# Patient Record
Sex: FEMALE | Race: OTHER | HISPANIC OR LATINO
[De-identification: names, ages, dates, MRNs, and addresses within clinical notes are randomized per-mention and may not be internally consistent; named-entity substitution may affect disease eponyms.]

---

## 2017-02-07 ENCOUNTER — NON-APPOINTMENT (OUTPATIENT)
Age: 77
End: 2017-02-07

## 2017-02-07 ENCOUNTER — APPOINTMENT (OUTPATIENT)
Dept: ELECTROPHYSIOLOGY | Facility: CLINIC | Age: 77
End: 2017-02-07

## 2017-02-07 VITALS
SYSTOLIC BLOOD PRESSURE: 115 MMHG | HEART RATE: 83 BPM | OXYGEN SATURATION: 97 % | WEIGHT: 147 LBS | BODY MASS INDEX: 23.63 KG/M2 | HEIGHT: 66 IN | DIASTOLIC BLOOD PRESSURE: 71 MMHG

## 2017-02-07 RX ORDER — REPAGLINIDE 1 MG/1
1 TABLET ORAL
Refills: 0 | Status: ACTIVE | COMMUNITY

## 2017-02-07 RX ORDER — PROPAFENONE 325 MG/1
325 CAPSULE, EXTENDED RELEASE ORAL
Refills: 0 | Status: DISCONTINUED | COMMUNITY
End: 2017-02-07

## 2017-03-24 ENCOUNTER — NON-APPOINTMENT (OUTPATIENT)
Age: 77
End: 2017-03-24

## 2017-03-24 ENCOUNTER — APPOINTMENT (OUTPATIENT)
Dept: CARDIOLOGY | Facility: CLINIC | Age: 77
End: 2017-03-24

## 2017-03-24 VITALS
WEIGHT: 151 LBS | OXYGEN SATURATION: 96 % | SYSTOLIC BLOOD PRESSURE: 137 MMHG | DIASTOLIC BLOOD PRESSURE: 80 MMHG | BODY MASS INDEX: 24.27 KG/M2 | HEIGHT: 66 IN | HEART RATE: 78 BPM

## 2017-04-22 ENCOUNTER — TRANSCRIPTION ENCOUNTER (OUTPATIENT)
Age: 77
End: 2017-04-22

## 2017-05-09 ENCOUNTER — APPOINTMENT (OUTPATIENT)
Dept: ELECTROPHYSIOLOGY | Facility: CLINIC | Age: 77
End: 2017-05-09

## 2017-05-09 DIAGNOSIS — Z86.79 PERSONAL HISTORY OF OTHER DISEASES OF THE CIRCULATORY SYSTEM: ICD-10-CM

## 2017-05-09 RX ORDER — BLOOD SUGAR DIAGNOSTIC
STRIP MISCELLANEOUS
Qty: 100 | Refills: 0 | Status: ACTIVE | COMMUNITY
Start: 2017-03-13

## 2017-08-15 ENCOUNTER — APPOINTMENT (OUTPATIENT)
Dept: CARDIOLOGY | Facility: CLINIC | Age: 77
End: 2017-08-15

## 2017-08-15 ENCOUNTER — APPOINTMENT (OUTPATIENT)
Dept: ELECTROPHYSIOLOGY | Facility: CLINIC | Age: 77
End: 2017-08-15

## 2017-11-21 ENCOUNTER — APPOINTMENT (OUTPATIENT)
Dept: ELECTROPHYSIOLOGY | Facility: CLINIC | Age: 77
End: 2017-11-21

## 2018-05-16 ENCOUNTER — RESULT REVIEW (OUTPATIENT)
Age: 78
End: 2018-05-16

## 2019-05-31 ENCOUNTER — APPOINTMENT (OUTPATIENT)
Dept: PULMONOLOGY | Facility: CLINIC | Age: 79
End: 2019-05-31
Payer: MEDICARE

## 2019-05-31 VITALS — SYSTOLIC BLOOD PRESSURE: 152 MMHG | DIASTOLIC BLOOD PRESSURE: 56 MMHG

## 2019-05-31 VITALS
HEIGHT: 66 IN | HEART RATE: 67 BPM | WEIGHT: 149 LBS | SYSTOLIC BLOOD PRESSURE: 100 MMHG | BODY MASS INDEX: 23.95 KG/M2 | OXYGEN SATURATION: 97 % | DIASTOLIC BLOOD PRESSURE: 52 MMHG

## 2019-05-31 DIAGNOSIS — R07.89 OTHER CHEST PAIN: ICD-10-CM

## 2019-05-31 PROCEDURE — 94060 EVALUATION OF WHEEZING: CPT

## 2019-05-31 PROCEDURE — 94729 DIFFUSING CAPACITY: CPT

## 2019-05-31 PROCEDURE — 99204 OFFICE O/P NEW MOD 45 MIN: CPT | Mod: 25

## 2019-05-31 PROCEDURE — 94727 GAS DIL/WSHOT DETER LNG VOL: CPT

## 2019-05-31 RX ORDER — SITAGLIPTIN 100 MG/1
100 TABLET, FILM COATED ORAL
Refills: 0 | Status: DISCONTINUED | COMMUNITY
End: 2019-05-31

## 2019-06-01 NOTE — DISCUSSION/SUMMARY
[FreeTextEntry1] : 77 y/o woman with PMH of HTN, HLD, DM, PAF, S/P PPM and nephrolithiasis. Has h/o TB treated in NH at the age of 9. \par \par She was complaining of a cough. The cough is improved. No active pulmonary disease at present.  Also has had some back pain. This could be due to the nephrolithiasis. A chest radiograph and KUB will be obtained. Further recommendations to follow.\par \par F/U in three months.

## 2019-06-01 NOTE — PHYSICAL EXAM
[General Appearance - In No Acute Distress] : no acute distress [Neck Appearance] : the appearance of the neck was normal [Neck Cervical Mass (___cm)] : no neck mass was observed [] : no hepato-splenomegaly [Auscultation Breath Sounds / Voice Sounds] : lungs were clear to auscultation bilaterally [Abnormal Walk] : normal gait [Cyanosis, Localized] : no localized cyanosis [Cranial Nerves] : cranial nerves 2-12 were intact [Skin Turgor] : normal skin turgor [Impaired Insight] : insight and judgment were intact [Deep Tendon Reflexes (DTR)] : deep tendon reflexes were 2+ and symmetric [Oriented To Time, Place, And Person] : oriented to person, place, and time [Erythema] : no erythema of the pharynx

## 2019-06-01 NOTE — HISTORY OF PRESENT ILLNESS
[FreeTextEntry1] : Has had a cough for several months. Was producing white phlegm. Never had any hemoptysis. No constitutional complaints. She has a history of tuberculosis at the age of 9. She was living in [place] at that time. Was treated with pills but cannot recall the name of them.

## 2019-06-01 NOTE — PROCEDURE
[FreeTextEntry1] : PFT 5/31/19: Spirometry was normal. Lungs lungs were normal. Diffusion was reduced to a moderate degree. Mild improvement noted after inhalation of bronchodilator.\par \par Chest radiograph from January 15, 2019 demonstrated biapical pleural thickening and scarring. Right sided pacemaker in position. No infiltrate.\par \par

## 2019-06-01 NOTE — REVIEW OF SYSTEMS
[Back Pain] : ~T back pain [Diabetes] : diabetes mellitus [Fever] : no fever [Cough] : no cough [Sputum] : not coughing up ~M sputum [Hemoptysis] : no hemoptysis [Nasal Discharge] : no nasal discharge [Edema] : ~T edema was not present [Nocturia] : no nocturia [Heartburn] : no heartburn [Rash] : no [unfilled] rash [Anemia] : no anemia [Difficulty Initiating Sleep] : no difficulty falling asleep [DVT] : no DVT [Difficulty Maintaining Sleep] : no difficulty maintaining sleep

## 2019-08-28 ENCOUNTER — APPOINTMENT (OUTPATIENT)
Dept: PULMONOLOGY | Facility: CLINIC | Age: 79
End: 2019-08-28

## 2021-04-02 DIAGNOSIS — Z20.828 CONTACT WITH AND (SUSPECTED) EXPOSURE TO OTHER VIRAL COMMUNICABLE DISEASES: ICD-10-CM

## 2021-04-09 ENCOUNTER — APPOINTMENT (OUTPATIENT)
Dept: DISASTER EMERGENCY | Facility: CLINIC | Age: 81
End: 2021-04-09

## 2021-04-09 DIAGNOSIS — Z01.818 ENCOUNTER FOR OTHER PREPROCEDURAL EXAMINATION: ICD-10-CM

## 2021-04-10 LAB — SARS-COV-2 N GENE NPH QL NAA+PROBE: NOT DETECTED

## 2021-04-13 ENCOUNTER — APPOINTMENT (OUTPATIENT)
Dept: PULMONOLOGY | Facility: CLINIC | Age: 81
End: 2021-04-13
Payer: MEDICARE

## 2021-04-13 VITALS
HEART RATE: 59 BPM | OXYGEN SATURATION: 96 % | DIASTOLIC BLOOD PRESSURE: 75 MMHG | TEMPERATURE: 97.9 F | SYSTOLIC BLOOD PRESSURE: 153 MMHG

## 2021-04-13 PROCEDURE — 94010 BREATHING CAPACITY TEST: CPT

## 2021-04-13 PROCEDURE — 94727 GAS DIL/WSHOT DETER LNG VOL: CPT

## 2021-04-13 PROCEDURE — 94729 DIFFUSING CAPACITY: CPT

## 2021-04-13 PROCEDURE — 71046 X-RAY EXAM CHEST 2 VIEWS: CPT

## 2021-04-13 PROCEDURE — 88738 HGB QUANT TRANSCUTANEOUS: CPT

## 2021-04-13 PROCEDURE — ZZZZZ: CPT

## 2021-04-13 PROCEDURE — 99204 OFFICE O/P NEW MOD 45 MIN: CPT | Mod: 25

## 2021-04-15 ENCOUNTER — OUTPATIENT (OUTPATIENT)
Dept: OUTPATIENT SERVICES | Facility: HOSPITAL | Age: 81
LOS: 1 days | End: 2021-04-15
Payer: MEDICARE

## 2021-04-15 ENCOUNTER — APPOINTMENT (OUTPATIENT)
Dept: CT IMAGING | Facility: IMAGING CENTER | Age: 81
End: 2021-04-15
Payer: MEDICARE

## 2021-04-15 DIAGNOSIS — R06.02 SHORTNESS OF BREATH: ICD-10-CM

## 2021-04-15 PROCEDURE — G1004: CPT

## 2021-04-15 PROCEDURE — 71250 CT THORAX DX C-: CPT | Mod: 26,ME

## 2021-04-15 PROCEDURE — 71250 CT THORAX DX C-: CPT

## 2021-04-15 NOTE — ASSESSMENT
[FreeTextEntry1] : Shortness of breath etiology unclear.  Chest x-ray unremarkable except for pacemaker.  PFTs do show low diffusing capacity.  Recommend chest CT for further evaluation

## 2021-04-15 NOTE — HISTORY OF PRESENT ILLNESS
[Never] : never [TextBox_4] : Patient reports chronic shortness of breath with reduced exercise tolerance.  Shortness of breath with exertion.  No wheezing.  No current active cough.  Remote history of tuberculosis treated in childhood with injectable medications.  Lifelong non-smoker denies occupational exposures.\par \par

## 2021-04-23 ENCOUNTER — APPOINTMENT (OUTPATIENT)
Dept: PULMONOLOGY | Facility: CLINIC | Age: 81
End: 2021-04-23
Payer: MEDICARE

## 2021-04-23 VITALS
RESPIRATION RATE: 15 BRPM | DIASTOLIC BLOOD PRESSURE: 80 MMHG | HEART RATE: 60 BPM | TEMPERATURE: 98 F | SYSTOLIC BLOOD PRESSURE: 147 MMHG | OXYGEN SATURATION: 99 %

## 2021-04-23 PROCEDURE — 99214 OFFICE O/P EST MOD 30 MIN: CPT

## 2021-04-24 NOTE — ASSESSMENT
[FreeTextEntry1] : Emphysema in the absence of significant smoking history obtained alpha-1 level and genetic testing.  Does not appear to have any spirometric impairment and therefore inhaler is unlikely to be of benefit.

## 2021-04-24 NOTE — HISTORY OF PRESENT ILLNESS
[TextBox_4] : Follow-up for shortness of breath.  Noted to have abnormal PFT sent for CT scan here for result review

## 2021-07-23 ENCOUNTER — APPOINTMENT (OUTPATIENT)
Dept: PULMONOLOGY | Facility: CLINIC | Age: 81
End: 2021-07-23
Payer: MEDICARE

## 2021-07-23 VITALS
DIASTOLIC BLOOD PRESSURE: 77 MMHG | SYSTOLIC BLOOD PRESSURE: 138 MMHG | OXYGEN SATURATION: 100 % | HEART RATE: 60 BPM | TEMPERATURE: 97.8 F

## 2021-07-23 VITALS — HEIGHT: 66 IN | BODY MASS INDEX: 24.27 KG/M2 | WEIGHT: 151 LBS

## 2021-07-23 PROCEDURE — 94664 DEMO&/EVAL PT USE INHALER: CPT

## 2021-07-23 PROCEDURE — 99213 OFFICE O/P EST LOW 20 MIN: CPT | Mod: 25

## 2021-07-24 NOTE — HISTORY OF PRESENT ILLNESS
[TextBox_4] : neck pain after fall\par told to see neuro\par \par Pulmonary status otherwise unchanged still with shortness of breath\par \par

## 2021-07-24 NOTE — ASSESSMENT
[FreeTextEntry1] : Emphysema as manifestation of COPD non-smoker alpha testing negative.  Patient requests inhaler trial samples of Anoro provided.\par Proper use and technique for inhaler demonstrated and explained.\par

## 2021-09-22 ENCOUNTER — EMERGENCY (EMERGENCY)
Facility: HOSPITAL | Age: 81
LOS: 1 days | Discharge: HOME | End: 2021-09-22
Attending: EMERGENCY MEDICINE | Admitting: EMERGENCY MEDICINE
Payer: MEDICARE

## 2021-09-22 VITALS
HEIGHT: 66 IN | DIASTOLIC BLOOD PRESSURE: 73 MMHG | TEMPERATURE: 98 F | RESPIRATION RATE: 20 BRPM | SYSTOLIC BLOOD PRESSURE: 151 MMHG | HEART RATE: 60 BPM | OXYGEN SATURATION: 100 %

## 2021-09-22 LAB
ALBUMIN SERPL ELPH-MCNC: 4.4 G/DL — SIGNIFICANT CHANGE UP (ref 3.3–5)
ALP SERPL-CCNC: 102 U/L — SIGNIFICANT CHANGE UP (ref 40–120)
ALT FLD-CCNC: 17 U/L — SIGNIFICANT CHANGE UP (ref 4–33)
ANION GAP SERPL CALC-SCNC: 17 MMOL/L — HIGH (ref 7–14)
APTT BLD: 37 SEC — HIGH (ref 27–36.3)
AST SERPL-CCNC: 21 U/L — SIGNIFICANT CHANGE UP (ref 4–32)
BASOPHILS # BLD AUTO: 0.03 K/UL — SIGNIFICANT CHANGE UP (ref 0–0.2)
BASOPHILS NFR BLD AUTO: 0.3 % — SIGNIFICANT CHANGE UP (ref 0–2)
BILIRUB SERPL-MCNC: 0.7 MG/DL — SIGNIFICANT CHANGE UP (ref 0.2–1.2)
BLOOD GAS VENOUS COMPREHENSIVE RESULT: SIGNIFICANT CHANGE UP
BUN SERPL-MCNC: 24 MG/DL — HIGH (ref 7–23)
CALCIUM SERPL-MCNC: 10.1 MG/DL — SIGNIFICANT CHANGE UP (ref 8.4–10.5)
CHLORIDE SERPL-SCNC: 100 MMOL/L — SIGNIFICANT CHANGE UP (ref 98–107)
CO2 SERPL-SCNC: 20 MMOL/L — LOW (ref 22–31)
CREAT SERPL-MCNC: 1.06 MG/DL — SIGNIFICANT CHANGE UP (ref 0.5–1.3)
EOSINOPHIL # BLD AUTO: 0.02 K/UL — SIGNIFICANT CHANGE UP (ref 0–0.5)
EOSINOPHIL NFR BLD AUTO: 0.2 % — SIGNIFICANT CHANGE UP (ref 0–6)
GLUCOSE SERPL-MCNC: 180 MG/DL — HIGH (ref 70–99)
HCT VFR BLD CALC: 36.5 % — SIGNIFICANT CHANGE UP (ref 34.5–45)
HGB BLD-MCNC: 12.7 G/DL — SIGNIFICANT CHANGE UP (ref 11.5–15.5)
IANC: 6.77 K/UL — SIGNIFICANT CHANGE UP (ref 1.5–8.5)
IMM GRANULOCYTES NFR BLD AUTO: 0.5 % — SIGNIFICANT CHANGE UP (ref 0–1.5)
INR BLD: 1.51 RATIO — HIGH (ref 0.88–1.16)
LYMPHOCYTES # BLD AUTO: 1.25 K/UL — SIGNIFICANT CHANGE UP (ref 1–3.3)
LYMPHOCYTES # BLD AUTO: 14.6 % — SIGNIFICANT CHANGE UP (ref 13–44)
MCHC RBC-ENTMCNC: 28.9 PG — SIGNIFICANT CHANGE UP (ref 27–34)
MCHC RBC-ENTMCNC: 34.8 GM/DL — SIGNIFICANT CHANGE UP (ref 32–36)
MCV RBC AUTO: 83.1 FL — SIGNIFICANT CHANGE UP (ref 80–100)
MONOCYTES # BLD AUTO: 0.47 K/UL — SIGNIFICANT CHANGE UP (ref 0–0.9)
MONOCYTES NFR BLD AUTO: 5.5 % — SIGNIFICANT CHANGE UP (ref 2–14)
NEUTROPHILS # BLD AUTO: 6.77 K/UL — SIGNIFICANT CHANGE UP (ref 1.8–7.4)
NEUTROPHILS NFR BLD AUTO: 78.9 % — HIGH (ref 43–77)
NRBC # BLD: 0 /100 WBCS — SIGNIFICANT CHANGE UP
NRBC # FLD: 0 K/UL — SIGNIFICANT CHANGE UP
PLATELET # BLD AUTO: 272 K/UL — SIGNIFICANT CHANGE UP (ref 150–400)
POTASSIUM SERPL-MCNC: 4.3 MMOL/L — SIGNIFICANT CHANGE UP (ref 3.5–5.3)
POTASSIUM SERPL-SCNC: 4.3 MMOL/L — SIGNIFICANT CHANGE UP (ref 3.5–5.3)
PROT SERPL-MCNC: 7.8 G/DL — SIGNIFICANT CHANGE UP (ref 6–8.3)
PROTHROM AB SERPL-ACNC: 16.9 SEC — HIGH (ref 10.6–13.6)
RBC # BLD: 4.39 M/UL — SIGNIFICANT CHANGE UP (ref 3.8–5.2)
RBC # FLD: 12.4 % — SIGNIFICANT CHANGE UP (ref 10.3–14.5)
SODIUM SERPL-SCNC: 137 MMOL/L — SIGNIFICANT CHANGE UP (ref 135–145)
TROPONIN T, HIGH SENSITIVITY RESULT: 12 NG/L — SIGNIFICANT CHANGE UP
WBC # BLD: 8.58 K/UL — SIGNIFICANT CHANGE UP (ref 3.8–10.5)
WBC # FLD AUTO: 8.58 K/UL — SIGNIFICANT CHANGE UP (ref 3.8–10.5)

## 2021-09-22 PROCEDURE — 71046 X-RAY EXAM CHEST 2 VIEWS: CPT | Mod: 26

## 2021-09-22 PROCEDURE — 99284 EMERGENCY DEPT VISIT MOD MDM: CPT

## 2021-09-22 RX ORDER — SODIUM CHLORIDE 9 MG/ML
500 INJECTION INTRAMUSCULAR; INTRAVENOUS; SUBCUTANEOUS ONCE
Refills: 0 | Status: DISCONTINUED | OUTPATIENT
Start: 2021-09-22 | End: 2021-09-23

## 2021-09-22 RX ORDER — SODIUM CHLORIDE 9 MG/ML
500 INJECTION INTRAMUSCULAR; INTRAVENOUS; SUBCUTANEOUS ONCE
Refills: 0 | Status: COMPLETED | OUTPATIENT
Start: 2021-09-22 | End: 2021-09-22

## 2021-09-22 RX ORDER — FAMOTIDINE 10 MG/ML
20 INJECTION INTRAVENOUS ONCE
Refills: 0 | Status: COMPLETED | OUTPATIENT
Start: 2021-09-22 | End: 2021-09-22

## 2021-09-22 RX ORDER — ONDANSETRON 8 MG/1
4 TABLET, FILM COATED ORAL ONCE
Refills: 0 | Status: COMPLETED | OUTPATIENT
Start: 2021-09-22 | End: 2021-09-22

## 2021-09-22 RX ORDER — MORPHINE SULFATE 50 MG/1
2 CAPSULE, EXTENDED RELEASE ORAL ONCE
Refills: 0 | Status: DISCONTINUED | OUTPATIENT
Start: 2021-09-22 | End: 2021-09-22

## 2021-09-22 RX ADMIN — SODIUM CHLORIDE 500 MILLILITER(S): 9 INJECTION INTRAMUSCULAR; INTRAVENOUS; SUBCUTANEOUS at 21:41

## 2021-09-22 RX ADMIN — ONDANSETRON 4 MILLIGRAM(S): 8 TABLET, FILM COATED ORAL at 21:34

## 2021-09-22 RX ADMIN — FAMOTIDINE 20 MILLIGRAM(S): 10 INJECTION INTRAVENOUS at 21:35

## 2021-09-22 NOTE — ED PROVIDER NOTE - OBJECTIVE STATEMENT
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 Located in the right lower, "burning pain all over" associated with nausea and NB vomit x 5, no diarrhea no fever. no chest pain. no sob. no dysuria. no back pain. no melena

## 2021-09-22 NOTE — ED PROVIDER NOTE - NSFOLLOWUPINSTRUCTIONS_ED_ALL_ED_FT
You were seen today in the emergency room for abdominal pain. Although the testing done today indicates that your pain is not from an acute emergency, your pain could still represent a more serious problem. Most commonly, the pain you are having is likely not something serious and will resolve in a few days, however testing was done today based on the symptoms that you currently have; so if you develop new or worsening symptoms this could be a sign of a problem that was not tested for and means you should come back to the emergency room or see your doctor urgently. You need to follow up with your doctor in the next 48-72 hours. If you develop any new or worsening symptoms you need to return immediately to the emergency department. If you experience any of the following please come right back to the emergency room: severe nausea and vomiting with inability to tolerate eating, severe worsening of your pain, a new fever, new bleeding in stool or vomit, confusion, new numbness or weakness, passing out.    Gastroenterology follow up is attached

## 2021-09-22 NOTE — ED PROVIDER NOTE - PHYSICAL EXAMINATION
Dr Gordillo  nontoxic non icteric mmm  normal S1-S2  No resp distress. able to speak in full and clear sentences. no wheeze, rales or stridor.  soft nondistended tender in the RLQ no guarding or rebound, no cvat   no pedal edema. no calf tenderness. normal pulses bilateral feet.

## 2021-09-22 NOTE — ED PROVIDER NOTE - PATIENT PORTAL LINK FT
You can access the FollowMyHealth Patient Portal offered by [facility] by registering at the following website: [email or web address]. By joining [facility]’s FollowMyHealth portal, you will also be able to view your health information using other applications (apps) compatible with our system.

## 2021-09-22 NOTE — ED PROVIDER NOTE - PROGRESS NOTE DETAILS
Pt states nausea has improved, pain decreased. pt pending repeat bvg after IVF for lactic, ua, lipase and a repeat trop. Ct pending. Sign out to night team. Hao, PGY-2  No longer complains of abdominal pain, abdomen NTTP, CTAP with no acute findings, tolerating PO. Will d/c with return precautions and GI f/u

## 2021-09-22 NOTE — ED ADULT NURSE NOTE - OBJECTIVE STATEMENT
Float RN   received pt in bed  A and Ox3 in NAD resting comfortably, c/o diffused abd pain, N/V since4 this morning, denies diarrhea or constipation, reports last BM yesterday " normal". abd soft non distended non tender. Orders noted and completed, pt placed on monitor with VSS. endorsed to primary RN for further care.

## 2021-09-22 NOTE — ED ADULT TRIAGE NOTE - CHIEF COMPLAINT QUOTE
pt coming with ABD pain N/V started today with some burning sensation at the epigastric area.    Denies CP, no dizziness.  pt on Eliquis

## 2021-09-22 NOTE — ED PROVIDER NOTE - NSFOLLOWUPCLINICS_GEN_ALL_ED_FT
Gastroenterology at [facility]  Gastroenterology  [address] [place] [address]  Phone: [phone]  Fax:     [facility]  Gastroenterology  [address] [place] [id number]  Phone: [phone]  Fax:

## 2021-09-22 NOTE — ED ADULT NURSE NOTE - NSIMPLEMENTINTERV_GEN_ALL_ED
Implemented All Fall with Harm Risk Interventions:  [place] to call system. Call bell, personal items and telephone within reach. Instruct patient to call for assistance. Room bathroom lighting operational. Non-slip footwear when patient is off stretcher. Physically safe environment: no spills, clutter or unnecessary equipment. Stretcher in lowest position, wheels locked, appropriate side rails in place. Provide visual cue, wrist band, yellow gown, etc. Monitor gait and stability. Monitor for mental status changes and reorient to person, place, and time. Review medications for side effects contributing to fall risk. Reinforce activity limits and safety measures with patient and family. Provide visual clues: red socks.

## 2021-09-23 VITALS
DIASTOLIC BLOOD PRESSURE: 74 MMHG | OXYGEN SATURATION: 98 % | RESPIRATION RATE: 14 BRPM | SYSTOLIC BLOOD PRESSURE: 186 MMHG | TEMPERATURE: 98 F | HEART RATE: 60 BPM

## 2021-09-23 LAB
APPEARANCE UR: CLEAR — SIGNIFICANT CHANGE UP
BILIRUB UR-MCNC: NEGATIVE — SIGNIFICANT CHANGE UP
BLOOD GAS VENOUS COMPREHENSIVE RESULT: SIGNIFICANT CHANGE UP
COLOR SPEC: COLORLESS — SIGNIFICANT CHANGE UP
DIFF PNL FLD: NEGATIVE — SIGNIFICANT CHANGE UP
GLUCOSE UR QL: NEGATIVE — SIGNIFICANT CHANGE UP
KETONES UR-MCNC: NEGATIVE — SIGNIFICANT CHANGE UP
LEUKOCYTE ESTERASE UR-ACNC: NEGATIVE — SIGNIFICANT CHANGE UP
NITRITE UR-MCNC: NEGATIVE — SIGNIFICANT CHANGE UP
PH UR: 6 — SIGNIFICANT CHANGE UP (ref 5–8)
PROT UR-MCNC: NEGATIVE — SIGNIFICANT CHANGE UP
SP GR SPEC: 1.01 — SIGNIFICANT CHANGE UP (ref 1–1.05)
UROBILINOGEN FLD QL: SIGNIFICANT CHANGE UP

## 2021-09-23 PROCEDURE — 74177 CT ABD & PELVIS W/CONTRAST: CPT | Mod: 26

## 2021-09-23 RX ORDER — VALSARTAN 80 MG/1
320 TABLET ORAL ONCE
Refills: 0 | Status: COMPLETED | OUTPATIENT
Start: 2021-09-23 | End: 2021-09-23

## 2021-09-23 RX ADMIN — VALSARTAN 320 MILLIGRAM(S): 80 TABLET ORAL at 04:40

## 2021-09-23 RX ADMIN — SODIUM CHLORIDE 500 MILLILITER(S): 9 INJECTION INTRAMUSCULAR; INTRAVENOUS; SUBCUTANEOUS at 01:00

## 2021-10-11 ENCOUNTER — APPOINTMENT (OUTPATIENT)
Dept: PULMONOLOGY | Facility: CLINIC | Age: 81
End: 2021-10-11
Payer: MEDICARE

## 2021-10-11 VITALS
OXYGEN SATURATION: 95 % | HEART RATE: 63 BPM | SYSTOLIC BLOOD PRESSURE: 154 MMHG | TEMPERATURE: 97.2 F | DIASTOLIC BLOOD PRESSURE: 84 MMHG

## 2021-10-11 DIAGNOSIS — Z23 ENCOUNTER FOR IMMUNIZATION: ICD-10-CM

## 2021-10-11 PROCEDURE — 99213 OFFICE O/P EST LOW 20 MIN: CPT | Mod: 25

## 2021-10-11 PROCEDURE — 90732 PPSV23 VACC 2 YRS+ SUBQ/IM: CPT

## 2021-10-11 PROCEDURE — G0009: CPT

## 2021-10-12 PROBLEM — Z23 ENCOUNTER FOR IMMUNIZATION: Status: ACTIVE | Noted: 2021-07-23

## 2021-10-12 NOTE — HISTORY OF PRESENT ILLNESS
[TextBox_4] : Follow-up for COPD currently on Anoro.  Feels somewhat better no new complaints refused flu shot.

## 2022-02-03 ENCOUNTER — APPOINTMENT (OUTPATIENT)
Dept: PULMONOLOGY | Facility: CLINIC | Age: 82
End: 2022-02-03
Payer: MEDICARE

## 2022-02-03 VITALS
HEIGHT: 66 IN | WEIGHT: 147 LBS | DIASTOLIC BLOOD PRESSURE: 75 MMHG | HEART RATE: 83 BPM | OXYGEN SATURATION: 95 % | TEMPERATURE: 98 F | SYSTOLIC BLOOD PRESSURE: 122 MMHG | BODY MASS INDEX: 23.63 KG/M2

## 2022-02-03 PROCEDURE — 71046 X-RAY EXAM CHEST 2 VIEWS: CPT

## 2022-02-03 PROCEDURE — 99214 OFFICE O/P EST MOD 30 MIN: CPT | Mod: 25

## 2022-02-03 NOTE — ASSESSMENT
[FreeTextEntry1] : Worsening shortness of breath.  No chest x-ray findings to explain this.  We will change Anoro to Trelegy but doubt will be more effective.\par 2 weeks of samples provided patient will contact me if it is more effective for replacement prescription Yes

## 2022-02-03 NOTE — HISTORY OF PRESENT ILLNESS
[Former] : former [Never] : never [TextBox_4] : Follow-up for COPD primarily emphysema currently on Anoro inhaler which used to be effective and has lately become less effective.  Some cough and congestion no fever or chills.  Reports recent normal lab work from PMD\par

## 2022-07-06 ENCOUNTER — INPATIENT (INPATIENT)
Facility: HOSPITAL | Age: 82
LOS: 0 days | Discharge: HOME | End: 2022-07-07
Attending: HOSPITALIST | Admitting: HOSPITALIST

## 2022-07-06 VITALS
TEMPERATURE: 98 F | HEIGHT: 66 IN | DIASTOLIC BLOOD PRESSURE: 58 MMHG | HEART RATE: 99 BPM | RESPIRATION RATE: 18 BRPM | SYSTOLIC BLOOD PRESSURE: 133 MMHG | OXYGEN SATURATION: 97 %

## 2022-07-06 DIAGNOSIS — U07.1 COVID-19: ICD-10-CM

## 2022-07-06 LAB
ALBUMIN SERPL ELPH-MCNC: 3.9 G/DL — SIGNIFICANT CHANGE UP (ref 3.3–5)
ALP SERPL-CCNC: 85 U/L — SIGNIFICANT CHANGE UP (ref 40–120)
ALT FLD-CCNC: 21 U/L — SIGNIFICANT CHANGE UP (ref 4–33)
ANION GAP SERPL CALC-SCNC: 14 MMOL/L — SIGNIFICANT CHANGE UP (ref 7–14)
APPEARANCE UR: CLEAR — SIGNIFICANT CHANGE UP
APTT BLD: 26.9 SEC — LOW (ref 27–36.3)
AST SERPL-CCNC: 22 U/L — SIGNIFICANT CHANGE UP (ref 4–32)
BACTERIA # UR AUTO: NEGATIVE — SIGNIFICANT CHANGE UP
BASE EXCESS BLDV CALC-SCNC: -3.9 MMOL/L — LOW (ref -2–3)
BASOPHILS # BLD AUTO: 0.02 K/UL — SIGNIFICANT CHANGE UP (ref 0–0.2)
BASOPHILS NFR BLD AUTO: 0.4 % — SIGNIFICANT CHANGE UP (ref 0–2)
BILIRUB SERPL-MCNC: 0.5 MG/DL — SIGNIFICANT CHANGE UP (ref 0.2–1.2)
BILIRUB UR-MCNC: NEGATIVE — SIGNIFICANT CHANGE UP
BLOOD GAS VENOUS COMPREHENSIVE RESULT: SIGNIFICANT CHANGE UP
BUN SERPL-MCNC: 22 MG/DL — SIGNIFICANT CHANGE UP (ref 7–23)
CALCIUM SERPL-MCNC: 9.2 MG/DL — SIGNIFICANT CHANGE UP (ref 8.4–10.5)
CHLORIDE BLDV-SCNC: 105 MMOL/L — SIGNIFICANT CHANGE UP (ref 96–108)
CHLORIDE SERPL-SCNC: 107 MMOL/L — SIGNIFICANT CHANGE UP (ref 98–107)
CO2 BLDV-SCNC: 22.8 MMOL/L — SIGNIFICANT CHANGE UP (ref 22–26)
CO2 SERPL-SCNC: 20 MMOL/L — LOW (ref 22–31)
COLOR SPEC: YELLOW — SIGNIFICANT CHANGE UP
CREAT SERPL-MCNC: 0.96 MG/DL — SIGNIFICANT CHANGE UP (ref 0.5–1.3)
DIFF PNL FLD: ABNORMAL
EGFR: 60 ML/MIN/1.73M2 — SIGNIFICANT CHANGE UP
EOSINOPHIL # BLD AUTO: 0 K/UL — SIGNIFICANT CHANGE UP (ref 0–0.5)
EOSINOPHIL NFR BLD AUTO: 0 % — SIGNIFICANT CHANGE UP (ref 0–6)
EPI CELLS # UR: 1 /HPF — SIGNIFICANT CHANGE UP (ref 0–5)
FLUAV AG NPH QL: SIGNIFICANT CHANGE UP
FLUBV AG NPH QL: SIGNIFICANT CHANGE UP
GAS PNL BLDV: 136 MMOL/L — SIGNIFICANT CHANGE UP (ref 136–145)
GLUCOSE BLDV-MCNC: 159 MG/DL — HIGH (ref 70–99)
GLUCOSE SERPL-MCNC: 151 MG/DL — HIGH (ref 70–99)
GLUCOSE UR QL: ABNORMAL
HCO3 BLDV-SCNC: 22 MMOL/L — SIGNIFICANT CHANGE UP (ref 22–29)
HCT VFR BLD CALC: 39.5 % — SIGNIFICANT CHANGE UP (ref 34.5–45)
HCT VFR BLDA CALC: 38 % — SIGNIFICANT CHANGE UP (ref 34.5–46.5)
HGB BLD CALC-MCNC: 12.8 G/DL — SIGNIFICANT CHANGE UP (ref 11.5–15.5)
HGB BLD-MCNC: 12.9 G/DL — SIGNIFICANT CHANGE UP (ref 11.5–15.5)
IANC: 2.84 K/UL — SIGNIFICANT CHANGE UP (ref 1.8–7.4)
IMM GRANULOCYTES NFR BLD AUTO: 0.4 % — SIGNIFICANT CHANGE UP (ref 0–1.5)
INR BLD: 1.64 RATIO — HIGH (ref 0.88–1.16)
KETONES UR-MCNC: NEGATIVE — SIGNIFICANT CHANGE UP
LACTATE BLDV-MCNC: 3.2 MMOL/L — HIGH (ref 0.5–2)
LEUKOCYTE ESTERASE UR-ACNC: NEGATIVE — SIGNIFICANT CHANGE UP
LYMPHOCYTES # BLD AUTO: 1.75 K/UL — SIGNIFICANT CHANGE UP (ref 1–3.3)
LYMPHOCYTES # BLD AUTO: 31.5 % — SIGNIFICANT CHANGE UP (ref 13–44)
MCHC RBC-ENTMCNC: 28.9 PG — SIGNIFICANT CHANGE UP (ref 27–34)
MCHC RBC-ENTMCNC: 32.7 GM/DL — SIGNIFICANT CHANGE UP (ref 32–36)
MCV RBC AUTO: 88.4 FL — SIGNIFICANT CHANGE UP (ref 80–100)
MONOCYTES # BLD AUTO: 0.92 K/UL — HIGH (ref 0–0.9)
MONOCYTES NFR BLD AUTO: 16.6 % — HIGH (ref 2–14)
NEUTROPHILS # BLD AUTO: 2.84 K/UL — SIGNIFICANT CHANGE UP (ref 1.8–7.4)
NEUTROPHILS NFR BLD AUTO: 51.1 % — SIGNIFICANT CHANGE UP (ref 43–77)
NITRITE UR-MCNC: NEGATIVE — SIGNIFICANT CHANGE UP
NRBC # BLD: 0 /100 WBCS — SIGNIFICANT CHANGE UP
NRBC # FLD: 0 K/UL — SIGNIFICANT CHANGE UP
PCO2 BLDV: 40 MMHG — SIGNIFICANT CHANGE UP (ref 39–42)
PH BLDV: 7.34 — SIGNIFICANT CHANGE UP (ref 7.32–7.43)
PH UR: 6 — SIGNIFICANT CHANGE UP (ref 5–8)
PLATELET # BLD AUTO: 202 K/UL — SIGNIFICANT CHANGE UP (ref 150–400)
PO2 BLDV: 44 MMHG — SIGNIFICANT CHANGE UP
POTASSIUM BLDV-SCNC: 4.6 MMOL/L — SIGNIFICANT CHANGE UP (ref 3.5–5.1)
POTASSIUM SERPL-MCNC: 4.4 MMOL/L — SIGNIFICANT CHANGE UP (ref 3.5–5.3)
POTASSIUM SERPL-SCNC: 4.4 MMOL/L — SIGNIFICANT CHANGE UP (ref 3.5–5.3)
PROT SERPL-MCNC: 7 G/DL — SIGNIFICANT CHANGE UP (ref 6–8.3)
PROT UR-MCNC: ABNORMAL
PROTHROM AB SERPL-ACNC: 19.1 SEC — HIGH (ref 10.5–13.4)
RBC # BLD: 4.47 M/UL — SIGNIFICANT CHANGE UP (ref 3.8–5.2)
RBC # FLD: 12.5 % — SIGNIFICANT CHANGE UP (ref 10.3–14.5)
RBC CASTS # UR COMP ASSIST: 13 /HPF — HIGH (ref 0–4)
RSV RNA NPH QL NAA+NON-PROBE: SIGNIFICANT CHANGE UP
SAO2 % BLDV: 65.5 % — SIGNIFICANT CHANGE UP
SARS-COV-2 RNA SPEC QL NAA+PROBE: DETECTED
SODIUM SERPL-SCNC: 141 MMOL/L — SIGNIFICANT CHANGE UP (ref 135–145)
SP GR SPEC: 1.04 — SIGNIFICANT CHANGE UP (ref 1–1.05)
UROBILINOGEN FLD QL: SIGNIFICANT CHANGE UP
WBC # BLD: 5.55 K/UL — SIGNIFICANT CHANGE UP (ref 3.8–10.5)
WBC # FLD AUTO: 5.55 K/UL — SIGNIFICANT CHANGE UP (ref 3.8–10.5)
WBC UR QL: 1 /HPF — SIGNIFICANT CHANGE UP (ref 0–5)

## 2022-07-06 PROCEDURE — 99223 1ST HOSP IP/OBS HIGH 75: CPT

## 2022-07-06 PROCEDURE — 71045 X-RAY EXAM CHEST 1 VIEW: CPT | Mod: 26

## 2022-07-06 PROCEDURE — 74177 CT ABD & PELVIS W/CONTRAST: CPT | Mod: 26,MA

## 2022-07-06 PROCEDURE — 99285 EMERGENCY DEPT VISIT HI MDM: CPT | Mod: FS,CS

## 2022-07-06 RX ORDER — ACETAMINOPHEN 500 MG
650 TABLET ORAL ONCE
Refills: 0 | Status: COMPLETED | OUTPATIENT
Start: 2022-07-06 | End: 2022-07-06

## 2022-07-06 RX ORDER — DEXAMETHASONE 0.5 MG/5ML
6 ELIXIR ORAL ONCE
Refills: 0 | Status: COMPLETED | OUTPATIENT
Start: 2022-07-06 | End: 2022-07-06

## 2022-07-06 RX ORDER — SODIUM CHLORIDE 9 MG/ML
1850 INJECTION INTRAMUSCULAR; INTRAVENOUS; SUBCUTANEOUS ONCE
Refills: 0 | Status: COMPLETED | OUTPATIENT
Start: 2022-07-06 | End: 2022-07-06

## 2022-07-06 RX ADMIN — Medication 6 MILLIGRAM(S): at 22:19

## 2022-07-06 RX ADMIN — SODIUM CHLORIDE 1850 MILLILITER(S): 9 INJECTION INTRAMUSCULAR; INTRAVENOUS; SUBCUTANEOUS at 17:26

## 2022-07-06 NOTE — ED PROVIDER NOTE - OBJECTIVE STATEMENT
never used
80 y/o F with h/o DM, HTN, PPM, Afib (Eliquis) c/o cough, diarrhea, RLQ ab pain, generalized weakness and "sweating" x 3 days. Denies CP, SOB, nausea, vomiting, dysuria, melena, numbness, tingling. COVID vac x 3. No known sick contacts. Blood pressure via EMS 86/50.  Allergy- PCN

## 2022-07-06 NOTE — H&P ADULT - NSHPLABSRESULTS_GEN_ALL_CORE
141  |  107  |  22  ----------------------------<  151<H>  4.4   |  20<L>  |  0.96    Ca    9.2      2022 17:17    TPro  7.0  /  Alb  3.9  /  TBili  0.5  /  DBili  x   /  AST  22  /  ALT  21  /  AlkPhos  85                         12.9   5.55  )-----------( 202      ( 2022 17:17 )             39.5     LIVER FUNCTIONS - ( 2022 17:17 )  Alb: 3.9 g/dL / Pro: 7.0 g/dL / ALK PHOS: 85 U/L / ALT: 21 U/L / AST: 22 U/L / GGT: x             PT/INR - ( 2022 17:17 )   PT: 19.1 sec;   INR: 1.64 ratio         PTT - ( 2022 17:17 )  PTT:26.9 sec    Urinalysis Basic - ( 2022 18:50 )    Color: Yellow / Appearance: Clear / S.042 / pH: x  Gluc: x / Ketone: Negative  / Bili: Negative / Urobili: <2 mg/dL   Blood: x / Protein: 30 mg/dL / Nitrite: Negative   Leuk Esterase: Negative / RBC: 13 /HPF / WBC 1 /HPF   Sq Epi: x / Non Sq Epi: 1 /HPF / Bacteria: Negative    EKG: Atrial fibrillation with , no new ischemic changes    Imaging: CXR clear. CT A/P without acute pathology.

## 2022-07-06 NOTE — ED PROVIDER NOTE - NSICDXFAMILYHX_GEN_ALL_CORE_FT
FAMILY HISTORY:  Father  Still living? Unknown  Family history of hypertension, Age at diagnosis: Age Unknown    Mother  Still living? Unknown  Family history of hypertension, Age at diagnosis: Age Unknown    Sibling  Still living? Unknown  Family history of hypertension, Age at diagnosis: Age Unknown    Aunt  Still living? Unknown  Family history of hypertension, Age at diagnosis: Age Unknown

## 2022-07-06 NOTE — H&P ADULT - PROBLEM SELECTOR PLAN 2
Patient currently is comfortable, on RA and looks well.  -Will need to repeat ambulatory pulse ox and if not hypoxia, then can be discharged home  -Symptom control  -Trend CRP but unlikely going to

## 2022-07-06 NOTE — ED PROVIDER NOTE - ATTENDING APP SHARED VISIT CONTRIBUTION OF CARE
I have personally performed a face to face medical and diagnostic evaluation of the patient. I have discussed with and reviewed the Resident's and/or ACP's and/or Medical/PA/NP student's note and agree with the History, ROS, Physical Exam and MDM unless otherwise indicated. A brief summary of my personal evaluation and impression can be found below.    81y F w/ PMHx DM, HTN, PPM, Afib (Eliquis) c/o cough, diarrhea, RLQ ab pain, generalized weakness and "sweating" x 3 days. Denies CP, SOB, nausea, vomiting, dysuria, melena, numbness, tingling. COVID vac x 3. Per EMS patient was hypotensive prior to arrival. Patient denies sob, chest pain, nausea, vomiting, dysuria, hematuria, recent falls. A&Ox3.  Physical Exam:  Gen: NAD, AOx3, non-toxic appearing  HEENT: EOMI, PEERL, normal conjunctiva, tongue midline, oral mucosa moist  Lung: CTAB, no respiratory distress, no wheezes/rhonchi/rales B/L, speaking in full sentences  CV: irregular rate, RR, no murmurs, rubs or gallops  Abd: soft, +tenderness in RLQ, ND, no guarding, no rigidity, no rebound tenderness, no CVA tenderness   MSK: no visible deformities, ROM normal in UE/LE, no back pain  Neuro: No focal sensory or motor deficits  Skin: Warm, well perfused, no rash, no leg swelling    Plan: Will obtain sepsis w/u, CT A/P, CXR, UA/UCx, will likely require admission to medicine as pt meets SIRS criteria.

## 2022-07-06 NOTE — ED PROVIDER NOTE - CLINICAL SUMMARY MEDICAL DECISION MAKING FREE TEXT BOX
80 y/o F with h/o DM, HTN, PPM, Afib (Eliquis) c/o cough, diarrhea, RLQ ab pain, generalized weakness and "sweating" x 3 days. Denies CP, SOB, nausea, vomiting, dysuria, melena, numbness, tingling. COVID vac x 3. No known sick contacts. Blood pressure via EMS 86/50.  Allergy- PCN  Plan: Will obtain sepsis work up, EKG, CXR, CT ab/pelvis, UA, UCX, Flu-COVID swab, give fluids and reassess

## 2022-07-06 NOTE — H&P ADULT - NSHPREVIEWOFSYSTEMS_GEN_ALL_CORE
REVIEW OF SYSTEMS:    CONSTITUTIONAL: No weakness, fevers or chills  EYES/ENT: No visual changes;  No dysphagia  NECK: No pain or stiffness  RESPIRATORY: No cough, wheezing, hemoptysis; No shortness of breath  CARDIOVASCULAR: No chest pain or palpitations; No lower extremity edema  GASTROINTESTINAL: No abdominal or epigastric pain. No nausea, vomiting, or hematemesis; No diarrhea or constipation. No melena or hematochezia.  GENITOURINARY: No dysuria, frequency or hematuria  NEUROLOGICAL: No numbness or weakness  SKIN: No itching, burning, rashes, or lesions   PSYCH: No auditory or visual hallucinations  All other review of systems is negative unless indicated above.

## 2022-07-06 NOTE — ED ADULT NURSE NOTE - OBJECTIVE STATEMENT
Break RN note- patient arrives to the ED for diarrhea since today and palpitations for the past three days. Patient reports the palpitations radiate to her neck. A&Ox4 but a poor historian. Patient denies any headache, chest pain, SOB, dizziness, nausea, vomiting, blood in stool, or dysuria. Patient breathing even and nonlabored. No acute distress. Belly soft, nondistended, and nontender. Patient arrives with an 18g IV to the left arm. Labs sent as ordered. COVID swab sent. Fluids administered as ordered. Cardiac monitor in place- afib. Patient reports she has a pacemaker and is on eliquis for the afib.

## 2022-07-06 NOTE — H&P ADULT - HISTORY OF PRESENT ILLNESS
80 y/o F with h/o DM, HTN, PPM 2/2 bradycardia, Afib (Eliquis) c/o cough, 1 episode of diarrhea, RLQ ab pain, generalized weakness. She came to the hospital because of 3 days of chest palpitations. She has chronic afib but felt like her chest was pounding more rapidly today. Denies CP, SOB, nausea, vomiting, dysuria, melena, numbness, dysuria, tingling. COVID vac x 3. No known sick contacts. Per ER note, Blood pressure via EMS was 86/50 but stable here in the ED. No fevers. Per resident note, she desaturates down to 77% on ambulation. Patient given decadron. Currently comfortable. EKG: Afib . CXR clear. CT A/P without acute pathology.

## 2022-07-06 NOTE — H&P ADULT - ASSESSMENT
80 y/o F with h/o DM, HTN, PPM 2/2 bradycardia, Afib (Eliquis) c/o cough, 1 episode of diarrhea, RLQ ab pain, generalized weakness admitted for acute hypoxic respiratory failure 2/2 COVIDpneumonia.

## 2022-07-06 NOTE — H&P ADULT - PROBLEM SELECTOR PLAN 1
Patient currently is comfortable, on RA and looks well.  -Will need to repeat ambulatory pulse ox and if not hypoxia, then can be discharged home  -Symptom control

## 2022-07-06 NOTE — ED PROVIDER NOTE - NSICDXPASTMEDICALHX_GEN_ALL_CORE_FT
PAST MEDICAL HISTORY:  Atrial Fibrillation     Bradycardia     DM (Diabetes Mellitus)     HTN (Hypertension)     Hypercholesteremia

## 2022-07-06 NOTE — ED PROVIDER NOTE - PROGRESS NOTE DETAILS
Dillon Sharp MD PGY-2  Received signout on this 81F with fatigue and symptoms c/f viral syndrome NOS. COVID now +, which likely explains everything. Workup otherwise unaremarkable, pt not hypoxic & CXR clear.   Likely can be d/c'd with return precautions. Dillon Sharp MD PGY-2  Pt with ambulatory sat 77% and persistent palpitations. Rate consistently < 110 at rest but rises to 115s with ambulation. Unfortunately, this indicates potential for decompensation, requiring hospital admisison.   PCP: Dr. Anderson

## 2022-07-06 NOTE — ED ADULT TRIAGE NOTE - CHIEF COMPLAINT QUOTE
Pt brought in by EMS from home complaining of generalized weakness and diarrhea since this AM. Pt was hypotensive 85/50 on ems arrival. Pt also complaining of a cough x few days. Pt denies chest pain, n/v/d, fever or chills.

## 2022-07-06 NOTE — ED PROVIDER NOTE - DATE/TIME 1
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 F/u as scheduled 2/18.    06-Jul-2022 19:37

## 2022-07-06 NOTE — ED ADULT NURSE NOTE - NSICDXPASTSURGICALHX_GEN_ALL_CORE_FT
PAST SURGICAL HISTORY:  Breast Cyst L breast cyst removed.    Cardiac Pacemaker     History of Oophorectomy     S/P Cataract Surgery     S/P Tonsillectomy     Status Post Hernia Repair

## 2022-07-06 NOTE — H&P ADULT - NSICDXPASTMEDICALHX_GEN_ALL_CORE_FT
PAST MEDICAL HISTORY:  Atrial Fibrillation     Bradycardia     DM (Diabetes Mellitus)     HTN (Hypertension)     Hypercholesteremia      10 y/o w/ hx of RAD p/w in resp distress since last night in setting of 3 days of URI, 1 days of fever. Urgent care tried 2 albuterol without improvement. pt has a alb neb at home however mom did not use it. no sick contact. no N/V/D.     PMH/PSH: RAD at 3 year old  Medications: alb neb PRN [uses maybe 2 times a year]   Allergies: NKDA  Vaccines: up-to-date, not for covid

## 2022-07-06 NOTE — ED PROVIDER NOTE - ATTENDING CONTRIBUTION TO CARE
I have personally performed a face to face medical and diagnostic evaluation of the patient. I have discussed with and reviewed the Resident's note and agree with the History, ROS, Physical Exam and MDM unless otherwise indicated. A brief summary of my personal evaluation and impression can be found below.    81y F w/ PMHx DM, HTN, PPM, Afib (Eliquis) c/o cough, diarrhea, RLQ ab pain, generalized weakness and "sweating" x 3 days. Denies CP, SOB, nausea, vomiting, dysuria, melena, numbness, tingling. COVID vac x 3. Per EMS patient was hypotensive prior to arrival. Patient denies sob, chest pain, nausea, vomiting, dysuria, hematuria, recent falls. A&Ox3.  Physical Exam:  Gen: NAD, AOx3, non-toxic appearing  HEENT: EOMI, PEERL, normal conjunctiva, tongue midline, oral mucosa moist  Lung: CTAB, no respiratory distress, no wheezes/rhonchi/rales B/L, speaking in full sentences  CV: irregular rate, RR, no murmurs, rubs or gallops  Abd: soft, +tenderness in RLQ, ND, no guarding, no rigidity, no rebound tenderness, no CVA tenderness   MSK: no visible deformities, ROM normal in UE/LE, no back pain  Neuro: No focal sensory or motor deficits  Skin: Warm, well perfused, no rash, no leg swelling    Plan: Will obtain sepsis w/u, CT A/P, CXR, UA/UCx, will likely require admission to medicine as pt meets SIRS criteria.

## 2022-07-06 NOTE — ED PROVIDER NOTE - NS ED ATTENDING STATEMENT MOD
Attending with This was a shared visit with the KANWAL. I reviewed and verified the documentation and independently performed the documented:

## 2022-07-06 NOTE — H&P ADULT - NSHPPHYSICALEXAM_GEN_ALL_CORE
PHYSICAL EXAM:  GENERAL: NAD, comfortable appearing  HEAD:  Atraumatic, Normocephalic  EYES: EOMI, PERRL, conjunctiva and sclera clear  NECK: Supple, No JVD  CHEST/LUNG: Clear to auscultation bilaterally; No wheezes, rales or rhonchi  HEART: Regular rate and rhythm; No murmurs, rubs, or gallops, (+)S1, S2  ABDOMEN: Soft, Nontender, Nondistended; Normal Bowel sounds   EXTREMITIES:  2+ Peripheral Pulses, No clubbing, cyanosis, or edema  PSYCH: normal mood and affect  NEUROLOGY: AAOx3, non-focal  SKIN: No rashes or lesions

## 2022-07-07 ENCOUNTER — TRANSCRIPTION ENCOUNTER (OUTPATIENT)
Age: 82
End: 2022-07-07

## 2022-07-07 VITALS
OXYGEN SATURATION: 100 % | SYSTOLIC BLOOD PRESSURE: 128 MMHG | RESPIRATION RATE: 18 BRPM | TEMPERATURE: 98 F | DIASTOLIC BLOOD PRESSURE: 60 MMHG | HEART RATE: 50 BPM

## 2022-07-07 DIAGNOSIS — Z29.9 ENCOUNTER FOR PROPHYLACTIC MEASURES, UNSPECIFIED: ICD-10-CM

## 2022-07-07 DIAGNOSIS — I48.20 CHRONIC ATRIAL FIBRILLATION, UNSPECIFIED: ICD-10-CM

## 2022-07-07 DIAGNOSIS — E11.9 TYPE 2 DIABETES MELLITUS WITHOUT COMPLICATIONS: ICD-10-CM

## 2022-07-07 DIAGNOSIS — I10 ESSENTIAL (PRIMARY) HYPERTENSION: ICD-10-CM

## 2022-07-07 DIAGNOSIS — J96.01 ACUTE RESPIRATORY FAILURE WITH HYPOXIA: ICD-10-CM

## 2022-07-07 DIAGNOSIS — U07.1 COVID-19: ICD-10-CM

## 2022-07-07 LAB
ALBUMIN SERPL ELPH-MCNC: 3.8 G/DL — SIGNIFICANT CHANGE UP (ref 3.3–5)
ALP SERPL-CCNC: 87 U/L — SIGNIFICANT CHANGE UP (ref 40–120)
ALT FLD-CCNC: 20 U/L — SIGNIFICANT CHANGE UP (ref 4–33)
ANION GAP SERPL CALC-SCNC: 12 MMOL/L — SIGNIFICANT CHANGE UP (ref 7–14)
AST SERPL-CCNC: 20 U/L — SIGNIFICANT CHANGE UP (ref 4–32)
BILIRUB SERPL-MCNC: 0.4 MG/DL — SIGNIFICANT CHANGE UP (ref 0.2–1.2)
BUN SERPL-MCNC: 21 MG/DL — SIGNIFICANT CHANGE UP (ref 7–23)
CALCIUM SERPL-MCNC: 9 MG/DL — SIGNIFICANT CHANGE UP (ref 8.4–10.5)
CHLORIDE SERPL-SCNC: 104 MMOL/L — SIGNIFICANT CHANGE UP (ref 98–107)
CO2 SERPL-SCNC: 20 MMOL/L — LOW (ref 22–31)
CREAT SERPL-MCNC: 0.89 MG/DL — SIGNIFICANT CHANGE UP (ref 0.5–1.3)
CRP SERPL-MCNC: 23.3 MG/L — HIGH
EGFR: 66 ML/MIN/1.73M2 — SIGNIFICANT CHANGE UP
GLUCOSE BLDC GLUCOMTR-MCNC: 213 MG/DL — HIGH (ref 70–99)
GLUCOSE BLDC GLUCOMTR-MCNC: 225 MG/DL — HIGH (ref 70–99)
GLUCOSE SERPL-MCNC: 239 MG/DL — HIGH (ref 70–99)
HCT VFR BLD CALC: 39.2 % — SIGNIFICANT CHANGE UP (ref 34.5–45)
HGB BLD-MCNC: 13 G/DL — SIGNIFICANT CHANGE UP (ref 11.5–15.5)
MAGNESIUM SERPL-MCNC: 1.8 MG/DL — SIGNIFICANT CHANGE UP (ref 1.6–2.6)
MCHC RBC-ENTMCNC: 28.8 PG — SIGNIFICANT CHANGE UP (ref 27–34)
MCHC RBC-ENTMCNC: 33.2 GM/DL — SIGNIFICANT CHANGE UP (ref 32–36)
MCV RBC AUTO: 86.7 FL — SIGNIFICANT CHANGE UP (ref 80–100)
NRBC # BLD: 0 /100 WBCS — SIGNIFICANT CHANGE UP
NRBC # FLD: 0 K/UL — SIGNIFICANT CHANGE UP
PLATELET # BLD AUTO: 193 K/UL — SIGNIFICANT CHANGE UP (ref 150–400)
POTASSIUM SERPL-MCNC: 4.2 MMOL/L — SIGNIFICANT CHANGE UP (ref 3.5–5.3)
POTASSIUM SERPL-SCNC: 4.2 MMOL/L — SIGNIFICANT CHANGE UP (ref 3.5–5.3)
PROT SERPL-MCNC: 6.9 G/DL — SIGNIFICANT CHANGE UP (ref 6–8.3)
RBC # BLD: 4.52 M/UL — SIGNIFICANT CHANGE UP (ref 3.8–5.2)
RBC # FLD: 12.6 % — SIGNIFICANT CHANGE UP (ref 10.3–14.5)
SODIUM SERPL-SCNC: 136 MMOL/L — SIGNIFICANT CHANGE UP (ref 135–145)
WBC # BLD: 3.58 K/UL — LOW (ref 3.8–10.5)
WBC # FLD AUTO: 3.58 K/UL — LOW (ref 3.8–10.5)

## 2022-07-07 PROCEDURE — 99239 HOSP IP/OBS DSCHRG MGMT >30: CPT

## 2022-07-07 RX ORDER — GLUCAGON INJECTION, SOLUTION 0.5 MG/.1ML
1 INJECTION, SOLUTION SUBCUTANEOUS ONCE
Refills: 0 | Status: DISCONTINUED | OUTPATIENT
Start: 2022-07-07 | End: 2022-07-07

## 2022-07-07 RX ORDER — INSULIN LISPRO 100/ML
VIAL (ML) SUBCUTANEOUS
Refills: 0 | Status: DISCONTINUED | OUTPATIENT
Start: 2022-07-07 | End: 2022-07-07

## 2022-07-07 RX ORDER — DEXTROSE 50 % IN WATER 50 %
15 SYRINGE (ML) INTRAVENOUS ONCE
Refills: 0 | Status: DISCONTINUED | OUTPATIENT
Start: 2022-07-07 | End: 2022-07-07

## 2022-07-07 RX ORDER — SODIUM CHLORIDE 9 MG/ML
1000 INJECTION, SOLUTION INTRAVENOUS
Refills: 0 | Status: DISCONTINUED | OUTPATIENT
Start: 2022-07-07 | End: 2022-07-07

## 2022-07-07 RX ORDER — PROPAFENONE HCL 150 MG
225 TABLET ORAL EVERY 12 HOURS
Refills: 0 | Status: DISCONTINUED | OUTPATIENT
Start: 2022-07-07 | End: 2022-07-07

## 2022-07-07 RX ORDER — ONDANSETRON 8 MG/1
4 TABLET, FILM COATED ORAL EVERY 8 HOURS
Refills: 0 | Status: DISCONTINUED | OUTPATIENT
Start: 2022-07-07 | End: 2022-07-07

## 2022-07-07 RX ORDER — DEXTROSE 50 % IN WATER 50 %
25 SYRINGE (ML) INTRAVENOUS ONCE
Refills: 0 | Status: DISCONTINUED | OUTPATIENT
Start: 2022-07-07 | End: 2022-07-07

## 2022-07-07 RX ORDER — METOPROLOL TARTRATE 50 MG
100 TABLET ORAL DAILY
Refills: 0 | Status: DISCONTINUED | OUTPATIENT
Start: 2022-07-07 | End: 2022-07-07

## 2022-07-07 RX ORDER — INSULIN LISPRO 100/ML
VIAL (ML) SUBCUTANEOUS AT BEDTIME
Refills: 0 | Status: DISCONTINUED | OUTPATIENT
Start: 2022-07-07 | End: 2022-07-07

## 2022-07-07 RX ORDER — ACETAMINOPHEN 500 MG
650 TABLET ORAL EVERY 6 HOURS
Refills: 0 | Status: DISCONTINUED | OUTPATIENT
Start: 2022-07-07 | End: 2022-07-07

## 2022-07-07 RX ORDER — DEXTROSE 50 % IN WATER 50 %
12.5 SYRINGE (ML) INTRAVENOUS ONCE
Refills: 0 | Status: DISCONTINUED | OUTPATIENT
Start: 2022-07-07 | End: 2022-07-07

## 2022-07-07 RX ORDER — VALSARTAN 80 MG/1
320 TABLET ORAL DAILY
Refills: 0 | Status: DISCONTINUED | OUTPATIENT
Start: 2022-07-07 | End: 2022-07-07

## 2022-07-07 RX ORDER — LANOLIN ALCOHOL/MO/W.PET/CERES
3 CREAM (GRAM) TOPICAL AT BEDTIME
Refills: 0 | Status: DISCONTINUED | OUTPATIENT
Start: 2022-07-07 | End: 2022-07-07

## 2022-07-07 RX ORDER — ACETAMINOPHEN 500 MG
2 TABLET ORAL
Qty: 0 | Refills: 0 | DISCHARGE
Start: 2022-07-07

## 2022-07-07 RX ORDER — ATORVASTATIN CALCIUM 80 MG/1
40 TABLET, FILM COATED ORAL AT BEDTIME
Refills: 0 | Status: DISCONTINUED | OUTPATIENT
Start: 2022-07-07 | End: 2022-07-07

## 2022-07-07 RX ORDER — APIXABAN 2.5 MG/1
5 TABLET, FILM COATED ORAL
Refills: 0 | Status: DISCONTINUED | OUTPATIENT
Start: 2022-07-07 | End: 2022-07-07

## 2022-07-07 RX ADMIN — Medication 100 MILLIGRAM(S): at 07:23

## 2022-07-07 RX ADMIN — VALSARTAN 320 MILLIGRAM(S): 80 TABLET ORAL at 06:23

## 2022-07-07 RX ADMIN — APIXABAN 5 MILLIGRAM(S): 2.5 TABLET, FILM COATED ORAL at 06:22

## 2022-07-07 RX ADMIN — Medication 2: at 07:23

## 2022-07-07 RX ADMIN — Medication 100 MILLIGRAM(S): at 06:23

## 2022-07-07 NOTE — PROGRESS NOTE ADULT - PROBLEM SELECTOR PLAN 6
FENP: No IVF, Lytes PRN, Regular, Eliquis    dc home today. Time spent 50 mins  Spoke to son Diego. Informed him that patient is clinically well. If patient develops chest pain or SOB, can return to hospital. Dehydration, covid infection can cause HR to be higher than baseline. Recommend outpt follow up with PCP if HR remains elevated.

## 2022-07-07 NOTE — DISCHARGE NOTE PROVIDER - NSDCMRMEDTOKEN_GEN_ALL_CORE_FT
acetaminophen 325 mg oral tablet: 2 tab(s) orally every 6 hours, As needed, Temp greater or equal to 38C (100.4F), Mild Pain (1 - 3)  atorvastatin 40 mg oral tablet: 1 tab(s) orally once a day  benzonatate 100 mg oral capsule: 1 cap(s) orally 3 times a day  Diovan 320 mg oral tablet: 1 tab(s) orally once a day  Eliquis 5 mg oral tablet: 1 tab(s) orally 2 times a day  metFORMIN 500 mg oral tablet: 1 tab(s) orally 2 times a day  Metoprolol Succinate  mg oral tablet, extended release: 1 tab(s) orally once a day  propafenone 225 mg oral capsule, extended release: 1 cap(s) orally every 12 hours

## 2022-07-07 NOTE — DISCHARGE NOTE PROVIDER - NSDCCPCAREPLAN_GEN_ALL_CORE_FT
PRINCIPAL DISCHARGE DIAGNOSIS  Diagnosis: 2019 novel coronavirus disease (COVID-19)  Assessment and Plan of Treatment: You have been diagnosed with the COVID-19 virus during your hospital stay. You must self quarantine to complete a 14 day time period.  Monitor for fevers, shortness of breath and cough primarily.  Monitor your temperature daily to not any changes and increases.    It has been determined that you no longer need hospitalization and can recover while remaining in self-quarantine at home. You should follow the prevention steps below until a healthcare provider or local or state health department says you can return to your normal activities.  1. You should restrict activities outside your home, except for getting medical care.  2. Do not go to work, school, or public areas.  3. Avoid using public transportation, ride-sharing, or taxis.  4. Separate yourself from other people and animals in your home.  5. Call ahead before visiting your doctor.  6. Wear a facemask.  7. Cover your coughs and sneezes.  8. Clean your hands often.  9. Avoid sharing personal household items.  10. Clean all “high-touch” surfaces everyday.  11. Monitor your symptoms.  If you have a medical emergency and need to call 911, notify the dispatch personnel that you have COVID-19 If possible, put on a facemask before emergency medical services arrive.  12. Stopping home isolation.  Patients with confirmed COVID-19 should remain under home isolation precautions for 14 days since the positive COVID-19 test and until the risk of secondary transmission to others is thought to be low. The decision to discontinue home isolation precautions should be made on a case-by-case basis, in consultation with healthcare providers and state and local health departments. Your [facility] Department of Health can be reached at [phone] for further information about COVID-19.        SECONDARY DISCHARGE DIAGNOSES  Diagnosis: Acute hypoxemic respiratory failure  Assessment and Plan of Treatment: Monitor with your PMD,     PRINCIPAL DISCHARGE DIAGNOSIS  Diagnosis: 2019 novel coronavirus disease (COVID-19)  Assessment and Plan of Treatment: You have been diagnosed with the COVID-19 virus during your hospital stay. You must self quarantine to complete a 14 day time period.  Monitor for fevers, shortness of breath and cough primarily.  Monitor your temperature daily to not any changes and increases.    It has been determined that you no longer need hospitalization and can recover while remaining in self-quarantine at home. You should follow the prevention steps below until a healthcare provider or local or state health department says you can return to your normal activities.  1. You should restrict activities outside your home, except for getting medical care.  2. Do not go to work, school, or public areas.  3. Avoid using public transportation, ride-sharing, or taxis.  4. Separate yourself from other people and animals in your home.  5. Call ahead before visiting your doctor.  6. Wear a facemask.  7. Cover your coughs and sneezes.  8. Clean your hands often.  9. Avoid sharing personal household items.  10. Clean all “high-touch” surfaces everyday.  11. Monitor your symptoms.  If you have a medical emergency and need to call 911, notify the dispatch personnel that you have COVID-19 If possible, put on a facemask before emergency medical services arrive.  12. Stopping home isolation.  Patients with confirmed COVID-19 should remain under home isolation precautions for 14 days since the positive COVID-19 test and until the risk of secondary transmission to others is thought to be low. The decision to discontinue home isolation precautions should be made on a case-by-case basis, in consultation with healthcare providers and state and local health departments. Your [facility] Department of Health can be reached at [phone] for further information about COVID-19.        SECONDARY DISCHARGE DIAGNOSES  Diagnosis: Diabetes mellitus  Assessment and Plan of Treatment: Monitor finger sticks pre-meal and bedtime, low salt, fat and carbohydrate diet, minimize glucose intake.  Exercise daily for at least 30 minutes and weight loss.  Follow up with primary care physician and endocrinologist for routine Hemoglobin A1C checks and management.  Follow up with your ophthalmologist for routine yearly vision exams.      Diagnosis: HTN (hypertension)  Assessment and Plan of Treatment: Low sodium and fat diet, continue anti-hypertensive medications, and follow up with primary care physician.      Diagnosis: Acute hypoxemic respiratory failure  Assessment and Plan of Treatment: Monitor with your PMD,

## 2022-07-07 NOTE — PROGRESS NOTE ADULT - PROBLEM SELECTOR PLAN 3
-Continue home regimen of metoprolol, propafenone  and Eliquis  -rate controlled at this time. Resting HR 50 and ambulatory HR 95

## 2022-07-07 NOTE — DISCHARGE NOTE PROVIDER - PROVIDER TOKENS
FREE:[LAST:[Dr Nunez],FIRST:[Misael],PHONE:[phone]],FAX:[(   )    -],FOLLOWUP:[1 week],ESTABLISHEDPATIENT:[T]]

## 2022-07-07 NOTE — DISCHARGE NOTE PROVIDER - HOSPITAL COURSE
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     ·  Problem: Acute hypoxemic respiratory failure.   ·  Plan: Patient currently is comfortable, on RA and looks well.  -Will need to repeat ambulatory pulse ox and if not hypoxia, then can be discharged home   Symptom control.    ·  Problem: Pneumonia due to COVID-19 virus.   ·  Plan: Patient currently is comfortable, on RA and looks well.  -Will need to repeat ambulatory pulse ox and if not hypoxia, then can be discharged home   Symptom control  will Trend CRP but unlikely going to      ·  Problem: Chronic atrial fibrillation.   ·  Plan: -Continue metoprolol and Eliquis.    ·  Problem: Diabetes mellitus.   ·  Plan: -ISS.monitor and glucose levels    ·  Problem: HTN (hypertension).   ·  Plan: -Continue Valsartan.

## 2022-07-07 NOTE — PATIENT PROFILE ADULT - DO YOU FEEL UNSAFE AT HOME, WORK, OR SCHOOL?

## 2022-07-07 NOTE — DISCHARGE NOTE PROVIDER - NSDCDCMDCOMP_GEN_ALL_CORE
08/18/20      Edwin Zabala  [address] [place] [address]       Joshua Pineda,    I have been unable to reach you by phone with your test results. All of the results look good. Kidney and liver function are good. No diabetes or prediabetes and your cholesterol numbers look awesome. Vitamin D level is currently normal. I would recommend that you take a vitamin D supplement from October 1st through April 1st. You can purchase 2000IU or 50mcg of vitamin D.    I hope that you are feeling ok. I am worried about your negative thoughts and thoughts of self harm. Please call me to update me on how you are feeling on increased dose of medication.     Sincerely,         CIARAN Valladares  [facility] [address] [facility] [address]  Phone: [phone]  Fax: [phone]              
This document is complete and the patient is ready for discharge.

## 2022-07-07 NOTE — PATIENT PROFILE ADULT - FUNCTIONAL ASSESSMENT - DAILY ACTIVITY 4.
HPI: 64 yo lady with pmhx of  Recurrent UTIs on Suppressive Therapy, COPD, Tobacco use, B/l Carotid Stenosis, DM, Hypothyroid, RLS, JUDITH on CPAP p/w the following concerns. Acc by sister Claudette:    Intervalhx: Sa holliday mgmt on 10/18, started Topamax  -Getting EGd/COLon w/ Dr. White/Andrew    Hernia- seen on CT. causing pain in RLE and LLE. Interested in surgery    Kidney stones- Seen on US and CT urogram. Has appt with Uro later this month    GI- Noticed blood when wiping  -still having chronic diarrhea, didn't bother trying cholestyramine    bp- Controlled after being elevated iN GI office    DM- A1c under 6.5%    Cough- 1 week, sister tested negative. No f/c/vomiting. Has nausea and chronic diarrhea.       ROS: 10 pt ROS negative apart from above    I have personally reviewed and updated the following: Medications, PMHx, SurgHx, SocHx, FamHx    Meds  albuterol  ammonium lactate  atorvastatin  blood glucose  CALCIUM ACETATE PO  cephalexin  clopidogrel  DISPENSE  Flovent HFA Aerosol  Fluticasone Propionate (Inhal) AEROSOL POWDER, BREATH ACTIVATED  gabapentin  hydroCORTisone  levothyroxine  metFORMIN  metoPROLOL tartrate  Pharmacist Choice Autocode Sys Kit  Pharmacist Choice Lancets Misc  tiZANidine  topiramate  vancomycin  Vitamin D3  zolpidem    Visit Vitals  /84   Pulse 62   Wt 110.2 kg (243 lb)   LMP 03/24/2013   SpO2 96%   BMI 43.05 kg/m²       PE: Physical Exam  Vitals and nursing note reviewed.   HENT:      Head: Normocephalic and atraumatic.      Right Ear: External ear normal.      Left Ear: External ear normal.   Eyes:      Extraocular Movements: Extraocular movements intact.   Cardiovascular:      Rate and Rhythm: Normal rate and regular rhythm.      Pulses: Normal pulses.   Pulmonary:      Effort: Pulmonary effort is normal. No respiratory distress.      Breath sounds: Normal breath sounds.   Abdominal:      General: Bowel sounds are normal.      Palpations: Abdomen is soft.   Musculoskeletal:          General: Normal range of motion.      Cervical back: Normal range of motion.   Skin:     General: Skin is warm and dry.   Neurological:      General: No focal deficit present.      Mental Status: She is alert and oriented to person, place, and time.   Psychiatric:         Mood and Affect: Affect normal.         Judgment: Judgment normal.         A/P: COPD with asthma (CMS/HCC)  - albuterol (ProAir HFA) 108 (90 Base) MCG/ACT inhaler; Inhale 2 puffs into the lungs every 4 hours as needed for Shortness of Breath or Wheezing.  Dispense: 8.5 g; Refill: 1  - fluticasone propionate (Flovent HFA) 44 MCG/ACT inhaler; Inhale 1 puff into the lungs 2 times daily.  Dispense: 10.6 g; Refill: 12  - Fluticasone Propionate, Inhal, 55 MCG/ACT AEROSOL POWDER, BREATH ACTIVATED; Inhale 2 puffs into the lungs 2 times daily.  Dispense: 1 each; Refill: 3  - COVID/FLU/RSV PANEL; Future  - COVID/FLU/RSV PANEL    Chronic obstructive pulmonary disease, unspecified COPD type (CMS/[facility])  - Fluticasone Propionate, Inhal, 55 MCG/ACT AEROSOL POWDER, BREATH ACTIVATED; Inhale 2 puffs into the lungs 2 times daily.  Dispense: 1 each; Refill: 3  - COVID/FLU/RSV PANEL; Future  - COVID/FLU/RSV PANEL    Tobacco abuse    Type 2 diabetes mellitus without complication, without long-term current use of insulin (CMS/HCC)  - atorvastatin (LIPITOR) 10 MG tablet; Take 1 tablet by mouth daily.  Dispense: 90 tablet; Refill: 2  - metFORMIN (GLUCOPHAGE) 500 MG tablet; Take 1 tablet by mouth 2 times daily (with meals).  Dispense: 180 tablet; Refill: 1    Acquired hypothyroidism  - levothyroxine 150 MCG tablet; Take 1 tablet by mouth daily. Patient will need to be seen for additional refills.  Dispense: 90 tablet; Refill: 0    Easy bruisability    Chronic insomnia  - zolpidem (AMBIEN) 5 MG tablet; Take 1 tablet by mouth nightly as needed for Sleep.  Dispense: 30 tablet; Refill: 1    Neuropathy  - gabapentin (NEURONTIN) 300 MG capsule; Take 1 capsule by mouth  nightly.  Dispense: 30 capsule; Refill: 0    Vitamin D deficiency  - Cholecalciferol (Vitamin D3) 1.25 mg (50,000 units) tablet; Take 1 tablet by mouth 1 day a week.  Dispense: 4 tablet; Refill: 11    Cough  - COVID/FLU/RSV PANEL; Future  - COVID/FLU/RSV PANEL  -refill meds  -RSV/flu/covid negative, called patient and let them know with results  -will need to use ihaler more regularly    I spent a total of 47 minutes on the day of the visit.  This includes pre-charting, chart review and documenting.        4 = No assist / stand by assistance

## 2022-07-07 NOTE — PATIENT PROFILE ADULT - FALL HARM RISK - HARM RISK INTERVENTIONS
Communicate Risk of Fall with Harm to all staff/Monitor gait and stability/Reinforce activity limits and safety measures with patient and family/Tailored Fall Risk Interventions/Visual Cue: Yellow wristband and red socks/Bed in lowest position, wheels locked, appropriate side rails in place/Call bell, personal items and telephone in reach/Instruct patient to call for assistance before getting out of bed or chair/Non-slip footwear when patient is out of bed/[place] to call system/Physically safe environment - no spills, clutter or unnecessary equipment/Purposeful Proactive Rounding/Room/bathroom lighting operational, light cord in reach

## 2022-07-07 NOTE — DISCHARGE NOTE NURSING/CASE MANAGEMENT/SOCIAL WORK - PATIENT PORTAL LINK FT
You can access the FollowMyHealth Patient Portal offered by [facility] by registering at the following website: [email or web address]. By joining [employer]’s FollowMyHealth portal, you will also be able to view your health information using other applications (apps) compatible with our system.

## 2022-07-07 NOTE — PROGRESS NOTE ADULT - SUBJECTIVE AND OBJECTIVE BOX
[facility]  Dr. Griselda Starks  Pager [phone]     Patient is a 81y old  Female who presents with a chief complaint of Chest palpitation (2022 09:53)    SUBJECTIVE / OVERNIGHT EVENTS: patient seen and examined. Reports she feels much better compared to admission. Denies chest pain or SOB. Ate breakfast. No dizziness or palpitations.     MEDICATIONS  (STANDING):  apixaban 5 milliGRAM(s) Oral two times a day  atorvastatin 40 milliGRAM(s) Oral at bedtime  benzonatate 100 milliGRAM(s) Oral three times a day  dextrose 5%. 1000 milliLiter(s) (50 mL/Hr) IV Continuous <Continuous>  dextrose 5%. 1000 milliLiter(s) (100 mL/Hr) IV Continuous <Continuous>  dextrose 50% Injectable 25 Gram(s) IV Push once  dextrose 50% Injectable 12.5 Gram(s) IV Push once  dextrose 50% Injectable 25 Gram(s) IV Push once  glucagon  Injectable 1 milliGRAM(s) IntraMuscular once  insulin lispro (ADMELOG) corrective regimen sliding scale   SubCutaneous three times a day before meals  insulin lispro (ADMELOG) corrective regimen sliding scale   SubCutaneous at bedtime  metoprolol succinate  milliGRAM(s) Oral daily  propafenone  milliGRAM(s) Oral every 12 hours  valsartan 320 milliGRAM(s) Oral daily    MEDICATIONS  (PRN):  acetaminophen     Tablet .. 650 milliGRAM(s) Oral every 6 hours PRN Temp greater or equal to 38C (100.4F), Mild Pain (1 - 3)  aluminum hydroxide/magnesium hydroxide/simethicone Suspension 30 milliLiter(s) Oral every 4 hours PRN Dyspepsia  dextrose Oral Gel 15 Gram(s) Oral once PRN Blood Glucose LESS THAN 70 milliGRAM(s)/deciliter  melatonin 3 milliGRAM(s) Oral at bedtime PRN Insomnia  ondansetron Injectable 4 milliGRAM(s) IV Push every 8 hours PRN Nausea and/or Vomiting        CAPILLARY BLOOD GLUCOSE  POCT Blood Glucose.: 225 mg/dL (2022 07:14)  POCT Blood Glucose.: 213 mg/dL (2022 00:45)    I&O's Summary    2022 07:01  -  2022 07:00  --------------------------------------------------------  IN: 700 mL / OUT: 430 mL / NET: 270 mL    Vital Signs Last 24 Hrs  T(C): 36.8 (2022 10:49), Max: 36.9 (2022 16:51)  T(F): 98.2 (2022 10:49), Max: 98.4 (2022 16:51)  HR: 50 (2022 10:49) (50 - 115)  BP: 128/60 (2022 10:49) (128/60 - 157/91)  BP(mean): --  RR: 18 (2022 10:49) (16 - 18)  SpO2: 100% (2022 10:49) (96% - 100%)    PHYSICAL EXAM:  GENERAL: NAD, well-developed  HEAD:  Atraumatic, Normocephalic  EYES: EOMI, PERRLA, conjunctiva and sclera clear  NECK: Supple, No JVD  CHEST/LUNG: Clear to auscultation bilaterally; No wheeze  HEART: irregular,   ABDOMEN: Soft, Nontender, Nondistended; Bowel sounds present  EXTREMITIES:  2+ Peripheral Pulses, No clubbing, cyanosis, or edema  PSYCH: AAOx3  NEUROLOGY: non-focal  SKIN: No rashes or lesions    LABS:                        13.0   3.58  )-----------( 193      ( 2022 07:20 )             39.2         136  |  104  |  21  ----------------------------<  239<H>  4.2   |  20<L>  |  0.89    Ca    9.0      2022 07:20  Mg     1.80         TPro  6.9  /  Alb  3.8  /  TBili  0.4  /  DBili  x   /  AST  20  /  ALT  20  /  AlkPhos  87      PT/INR - ( 2022 17:17 )   PT: 19.1 sec;   INR: 1.64 ratio         PTT - ( 2022 17:17 )  PTT:26.9 sec      Urinalysis Basic - ( 2022 18:50 )    Color: Yellow / Appearance: Clear / S.042 / pH: x  Gluc: x / Ketone: Negative  / Bili: Negative / Urobili: <2 mg/dL   Blood: x / Protein: 30 mg/dL / Nitrite: Negative   Leuk Esterase: Negative / RBC: 13 /HPF / WBC 1 /HPF   Sq Epi: x / Non Sq Epi: 1 /HPF / Bacteria: Negative        RADIOLOGY & ADDITIONAL TESTS: < from: Xray Chest 1 View- PORTABLE-Urgent (22 @ 18:37) >    IMPRESSION:  Clear lungs.    < end of copied text >    EKG personally reviewed - afib 105     Imaging Personally Reviewed:    Consultant(s) Notes Reviewed:      Care Discussed with Consultants/Other Providers:    Assessment and Plan:

## 2022-07-07 NOTE — DISCHARGE NOTE PROVIDER - NSDCFUSCHEDAPPT_GEN_ALL_CORE_FT
Aman Nunez  [facility]  [phone] [facility]  Scheduled Appointment: 07/11/2022

## 2022-07-07 NOTE — DISCHARGE NOTE PROVIDER - CARE PROVIDER_API CALL
Misael Dominguez  Phone: [phone]  Fax: (   )    -  Established Patient  Follow Up Time: 1 week

## 2022-07-07 NOTE — PROGRESS NOTE ADULT - ASSESSMENT
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

## 2022-07-08 ENCOUNTER — APPOINTMENT (OUTPATIENT)
Dept: PULMONOLOGY | Facility: CLINIC | Age: 82
End: 2022-07-08

## 2022-07-08 ENCOUNTER — TRANSCRIPTION ENCOUNTER (OUTPATIENT)
Age: 82
End: 2022-07-08

## 2022-07-08 DIAGNOSIS — U07.1 COVID-19: ICD-10-CM

## 2022-07-08 LAB
CULTURE RESULTS: SIGNIFICANT CHANGE UP
SPECIMEN SOURCE: SIGNIFICANT CHANGE UP

## 2022-07-08 PROCEDURE — 99442: CPT | Mod: CS,95

## 2022-07-08 NOTE — HISTORY OF PRESENT ILLNESS
[FreeTextEntry1] : Patient with increased respiratory symptoms for past week.  Was seen in ER earlier this week COVID-positive.  Sent home on cough suppressants.  Continues with chest congestion.  No fever but feels weak and is coughing.

## 2022-07-08 NOTE — PLAN
[FreeTextEntry1] : Increased respiratory symptoms.  COVID positive hospital labs reviewed and hospital chest x-ray reviewed no acute findings at that time.  Will start on dexamethasone.  Patient was scheduled to see me in office on July 9 will cancel and reschedule for later in the week\par \par Time 11 minutes

## 2022-07-10 ENCOUNTER — EMERGENCY (EMERGENCY)
Facility: HOSPITAL | Age: 82
LOS: 1 days | Discharge: HOME | End: 2022-07-10
Attending: STUDENT IN AN ORGANIZED HEALTH CARE EDUCATION/TRAINING PROGRAM | Admitting: EMERGENCY MEDICINE

## 2022-07-10 VITALS
HEIGHT: 66 IN | DIASTOLIC BLOOD PRESSURE: 105 MMHG | SYSTOLIC BLOOD PRESSURE: 142 MMHG | OXYGEN SATURATION: 99 % | TEMPERATURE: 98 F | HEART RATE: 96 BPM | RESPIRATION RATE: 16 BRPM

## 2022-07-10 LAB
ALBUMIN SERPL ELPH-MCNC: 4.2 G/DL — SIGNIFICANT CHANGE UP (ref 3.3–5)
ALP SERPL-CCNC: 85 U/L — SIGNIFICANT CHANGE UP (ref 40–120)
ALT FLD-CCNC: 22 U/L — SIGNIFICANT CHANGE UP (ref 4–33)
ANION GAP SERPL CALC-SCNC: 19 MMOL/L — HIGH (ref 7–14)
APPEARANCE UR: CLEAR — SIGNIFICANT CHANGE UP
AST SERPL-CCNC: 20 U/L — SIGNIFICANT CHANGE UP (ref 4–32)
BACTERIA # UR AUTO: NEGATIVE — SIGNIFICANT CHANGE UP
BASE EXCESS BLDV CALC-SCNC: -6 MMOL/L — LOW (ref -2–3)
BASOPHILS # BLD AUTO: 0.01 K/UL — SIGNIFICANT CHANGE UP (ref 0–0.2)
BASOPHILS NFR BLD AUTO: 0.1 % — SIGNIFICANT CHANGE UP (ref 0–2)
BILIRUB SERPL-MCNC: 0.6 MG/DL — SIGNIFICANT CHANGE UP (ref 0.2–1.2)
BILIRUB UR-MCNC: NEGATIVE — SIGNIFICANT CHANGE UP
BLOOD GAS VENOUS COMPREHENSIVE RESULT: SIGNIFICANT CHANGE UP
BUN SERPL-MCNC: 31 MG/DL — HIGH (ref 7–23)
CALCIUM SERPL-MCNC: 9.4 MG/DL — SIGNIFICANT CHANGE UP (ref 8.4–10.5)
CHLORIDE BLDV-SCNC: 103 MMOL/L — SIGNIFICANT CHANGE UP (ref 96–108)
CHLORIDE SERPL-SCNC: 100 MMOL/L — SIGNIFICANT CHANGE UP (ref 98–107)
CO2 BLDV-SCNC: 19.8 MMOL/L — LOW (ref 22–26)
CO2 SERPL-SCNC: 16 MMOL/L — LOW (ref 22–31)
COLOR SPEC: SIGNIFICANT CHANGE UP
CREAT SERPL-MCNC: 0.96 MG/DL — SIGNIFICANT CHANGE UP (ref 0.5–1.3)
DIFF PNL FLD: ABNORMAL
EGFR: 60 ML/MIN/1.73M2 — SIGNIFICANT CHANGE UP
EOSINOPHIL # BLD AUTO: 0 K/UL — SIGNIFICANT CHANGE UP (ref 0–0.5)
EOSINOPHIL NFR BLD AUTO: 0 % — SIGNIFICANT CHANGE UP (ref 0–6)
EPI CELLS # UR: 1 /HPF — SIGNIFICANT CHANGE UP (ref 0–5)
GAS PNL BLDV: 133 MMOL/L — LOW (ref 136–145)
GAS PNL BLDV: SIGNIFICANT CHANGE UP
GLUCOSE BLDV-MCNC: 218 MG/DL — HIGH (ref 70–99)
GLUCOSE SERPL-MCNC: 211 MG/DL — HIGH (ref 70–99)
GLUCOSE UR QL: ABNORMAL
HCO3 BLDV-SCNC: 19 MMOL/L — LOW (ref 22–29)
HCT VFR BLD CALC: 37.9 % — SIGNIFICANT CHANGE UP (ref 34.5–45)
HCT VFR BLDA CALC: 39 % — SIGNIFICANT CHANGE UP (ref 34.5–46.5)
HGB BLD CALC-MCNC: 12.9 G/DL — SIGNIFICANT CHANGE UP (ref 11.5–15.5)
HGB BLD-MCNC: 12.7 G/DL — SIGNIFICANT CHANGE UP (ref 11.5–15.5)
HYALINE CASTS # UR AUTO: 0 /LPF — SIGNIFICANT CHANGE UP (ref 0–7)
IANC: 7.98 K/UL — HIGH (ref 1.8–7.4)
IMM GRANULOCYTES NFR BLD AUTO: 0.4 % — SIGNIFICANT CHANGE UP (ref 0–1.5)
KETONES UR-MCNC: NEGATIVE — SIGNIFICANT CHANGE UP
LACTATE BLDV-MCNC: 5.1 MMOL/L — CRITICAL HIGH (ref 0.5–2)
LEUKOCYTE ESTERASE UR-ACNC: NEGATIVE — SIGNIFICANT CHANGE UP
LYMPHOCYTES # BLD AUTO: 1.36 K/UL — SIGNIFICANT CHANGE UP (ref 1–3.3)
LYMPHOCYTES # BLD AUTO: 13.8 % — SIGNIFICANT CHANGE UP (ref 13–44)
MCHC RBC-ENTMCNC: 28.5 PG — SIGNIFICANT CHANGE UP (ref 27–34)
MCHC RBC-ENTMCNC: 33.5 GM/DL — SIGNIFICANT CHANGE UP (ref 32–36)
MCV RBC AUTO: 85 FL — SIGNIFICANT CHANGE UP (ref 80–100)
MONOCYTES # BLD AUTO: 0.48 K/UL — SIGNIFICANT CHANGE UP (ref 0–0.9)
MONOCYTES NFR BLD AUTO: 4.9 % — SIGNIFICANT CHANGE UP (ref 2–14)
NEUTROPHILS # BLD AUTO: 7.98 K/UL — HIGH (ref 1.8–7.4)
NEUTROPHILS NFR BLD AUTO: 80.8 % — HIGH (ref 43–77)
NITRITE UR-MCNC: NEGATIVE — SIGNIFICANT CHANGE UP
NRBC # BLD: 0 /100 WBCS — SIGNIFICANT CHANGE UP
NRBC # FLD: 0 K/UL — SIGNIFICANT CHANGE UP
PCO2 BLDV: 34 MMHG — LOW (ref 39–42)
PH BLDV: 7.35 — SIGNIFICANT CHANGE UP (ref 7.32–7.43)
PH UR: 6 — SIGNIFICANT CHANGE UP (ref 5–8)
PLATELET # BLD AUTO: 305 K/UL — SIGNIFICANT CHANGE UP (ref 150–400)
PO2 BLDV: 43 MMHG — SIGNIFICANT CHANGE UP
POTASSIUM BLDV-SCNC: 3.7 MMOL/L — SIGNIFICANT CHANGE UP (ref 3.5–5.1)
POTASSIUM SERPL-MCNC: 3.9 MMOL/L — SIGNIFICANT CHANGE UP (ref 3.5–5.3)
POTASSIUM SERPL-SCNC: 3.9 MMOL/L — SIGNIFICANT CHANGE UP (ref 3.5–5.3)
PROT SERPL-MCNC: 7.3 G/DL — SIGNIFICANT CHANGE UP (ref 6–8.3)
PROT UR-MCNC: ABNORMAL
RBC # BLD: 4.46 M/UL — SIGNIFICANT CHANGE UP (ref 3.8–5.2)
RBC # FLD: 12.2 % — SIGNIFICANT CHANGE UP (ref 10.3–14.5)
RBC CASTS # UR COMP ASSIST: 4 /HPF — SIGNIFICANT CHANGE UP (ref 0–4)
SAO2 % BLDV: 71.3 % — SIGNIFICANT CHANGE UP
SODIUM SERPL-SCNC: 135 MMOL/L — SIGNIFICANT CHANGE UP (ref 135–145)
SP GR SPEC: 1.02 — SIGNIFICANT CHANGE UP (ref 1–1.05)
UROBILINOGEN FLD QL: SIGNIFICANT CHANGE UP
WBC # BLD: 9.87 K/UL — SIGNIFICANT CHANGE UP (ref 3.8–10.5)
WBC # FLD AUTO: 9.87 K/UL — SIGNIFICANT CHANGE UP (ref 3.8–10.5)
WBC UR QL: 1 /HPF — SIGNIFICANT CHANGE UP (ref 0–5)

## 2022-07-10 PROCEDURE — 71045 X-RAY EXAM CHEST 1 VIEW: CPT | Mod: 26

## 2022-07-10 PROCEDURE — 99284 EMERGENCY DEPT VISIT MOD MDM: CPT | Mod: CS,GC

## 2022-07-10 RX ORDER — SODIUM CHLORIDE 9 MG/ML
1000 INJECTION, SOLUTION INTRAVENOUS ONCE
Refills: 0 | Status: COMPLETED | OUTPATIENT
Start: 2022-07-10 | End: 2022-07-10

## 2022-07-10 RX ADMIN — SODIUM CHLORIDE 1000 MILLILITER(S): 9 INJECTION, SOLUTION INTRAVENOUS at 22:21

## 2022-07-10 RX ADMIN — SODIUM CHLORIDE 1000 MILLILITER(S): 9 INJECTION, SOLUTION INTRAVENOUS at 21:19

## 2022-07-10 NOTE — ED PROVIDER NOTE - NSFOLLOWUPINSTRUCTIONS_ED_ALL_ED_FT
You were seen here today in the ED for COVID-19 symptoms.    People with COVID-19 have had a wide range of symptoms reported – ranging from mild symptoms to severe illness. Symptoms may appear 2-14 days after exposure to the virus. Anyone can have mild to severe symptoms.     • Stay home. Most people with COVID-19 have mild illness and can recover at home without medical care. Do not leave your home, except to get medical care. Do not visit public areas and do not go to places where you are unable to wear a mask.  • Take care of yourself. Get rest and stay hydrated. Take over-the-counter medicines, such as acetaminophen, to help you feel better.  • Stay in touch with your doctor. Call before you get medical care. Be sure to get care if you have trouble breathing, or have any other emergency warning signs, or if you think it is an emergency.  • Avoid public transportation, ride-sharing, or taxis if possible.  Monitor your symptoms  •Symptoms of COVID-19 include fever, cough, or other symptoms.   •Follow care instructions from your healthcare provider and local health department. Your local health authorities may give instructions on checking your symptoms and reporting information.    Look for emergency warning signs for COVID-19. If someone is showing any of these signs, seek emergency medical care immediately:  •Trouble breathing  •Persistent pain or pressure in the chest  •New confusion  •Inability to wake or stay awake  •Pale, gray, or blue-colored skin, lips, or nail beds, depending on skin tone    Please follow up with your primary care provider in 1 week.

## 2022-07-10 NOTE — ED ADULT TRIAGE NOTE - CHIEF COMPLAINT QUOTE
Pt arrives via ambulance from home for SOB, dizziness and fatigue. Reports tested positive for covid for about wk and 1/2. Pt respirations even and unlabored, denies CP, chills. PMHx: a-fib, DM, bradycardia, has pacemaker

## 2022-07-10 NOTE — ED PROVIDER NOTE - PATIENT PORTAL LINK FT
You can access the FollowMyHealth Patient Portal offered by [facility] by registering at the following website: [email or web address]. By joining [employer]’s FollowMyHealth portal, you will also be able to view your health information using other applications (apps) compatible with our system.

## 2022-07-10 NOTE — ED PROVIDER NOTE - OBJECTIVE STATEMENT
81F with PMH A-fib (on Eliquis), DM, bradycardia, pacemaker BIBA from home for SOB, dizziness, leg weakness, and fatigue x 1 day. Patient reports testing positive for covid 1 week ago with associated cough and flulike symptoms. Patient further notes she has had 2 episodes of diarrhea that improved after taking Imodium and c/o of left upper back pain x few days. Patient denies CP, chills, N/V, fever, vision changes, recent travel and sick contacts. 81F with PMH A-fib (on Eliquis), DM, bradycardia, pacemaker BIBA from home for SOB, dizziness, leg weakness, and fatigue x 1 day. Patient reports testing positive for COVID 1 week ago with associated cough and flulike symptoms. Patient further notes she has had 2 episodes of diarrhea that improved after taking Imodium and c/o of left upper back pain x few days. Patient denies CP, chills, N/V, fever, vision changes, recent travel and sick contacts.

## 2022-07-10 NOTE — ED ADULT NURSE NOTE - OBJECTIVE STATEMENT
Patient received in room 7, A/Ox4, ambulatory, c/o covid symptoms. Patient states she tested positive for covid 1 week ago. Endorses QUEZADA, weakness and fatigue x 1 day. Endorses productive cough and diarrhea. Denies chest pain, fever, [id number] and N/V. Placed on cardiac monitor, afib. 20G IV placed to left hand. Bed in lowest position, call bell within reach.

## 2022-07-10 NOTE — ED ADULT NURSE NOTE - NS ED NURSE DISCH DISPOSITION
What Is The Reason For Today's Visit?: History of Non-Melanoma Skin Cancer
How Many Skin Cancers Have You Had?: more than one
When Was Your Last Cancer Diagnosed?: 2020
Discharged

## 2022-07-10 NOTE — ED PROVIDER NOTE - PHYSICAL EXAMINATION
Vital Signs Last 24 Hrs  T(C): 36.9 (10 Jul 2022 20:08), Max: 36.9 (10 Jul 2022 20:08)  T(F): 98.4 (10 Jul 2022 20:08), Max: 98.4 (10 Jul 2022 20:08)  HR: 99 (10 Jul 2022 21:04) (96 - 99)  BP: 142/97 (10 Jul 2022 21:04) (142/97 - 142/105)  BP(mean): --  RR: 19 (10 Jul 2022 21:04) (16 - 19)  SpO2: 100% (10 Jul 2022 21:04) (99% - 100%)    Parameters below as of 10 Jul 2022 21:04  Patient On (Oxygen Delivery Method): room air    CONSTITUTIONAL: NAD, well-developed, well-groomed  EYES: PERRLA; conjunctiva and sclera clear  ENMT: Moist oral mucosa  RESPIRATORY: Normal respiratory effort; lungs are clear to auscultation bilaterally  CARDIOVASCULAR: Regular rate and rhythm, normal S1 and S2, no murmur/rub/gallop; No lower extremity edema; Peripheral pulses are 2+ bilaterally  ABDOMEN: Nontender to palpation, normoactive bowel sounds, no rebound/guarding  MUSCULOSKELETAL: No joint swelling or tenderness to palpation  PSYCH: A+O to person, place, and time; affect appropriate  NEUROLOGY: CN 2-12 are intact and symmetric; no gross sensory deficits   SKIN: No rashes; no palpable lesions

## 2022-07-10 NOTE — ED PROVIDER NOTE - CLINICAL SUMMARY MEDICAL DECISION MAKING FREE TEXT BOX
81F with PMH A-fib (on Eliquis), DM, bradycardia, pacemaker BIBA from home for SOB, dizziness, leg weakness, and fatigue x 1 day. Patient reports testing positive for COVID 1 week ago with associated cough and flulike symptoms. Patient further notes she has had 2 episodes of diarrhea that improved after taking Imodium and c/o of left upper back pain x few days. Patient denies CP, chills, N/V, fever, vision changes, recent travel and sick contacts.

## 2022-07-10 NOTE — ED PROVIDER NOTE - ATTENDING CONTRIBUTION TO CARE
81F h/o Afib on Eliquis s/p PPM, DM, recently dx with Covid, p/w SOB, dizziness, leg weakness, and fatigue x 1 day. Covid positive 1 week ago, prescribed decadron by outpt pulm. Reports continued cough with yellow phlegm, fatigue and muscle aches. Denies CP, chills, N/V, fever, vision changes, recent travel and sick contacts. Pt non-tioxic appearing, non-labored breathing, speaking full sentences. VSS wnl. Cardiac RRR, lungs clear. Abd soft,ntnd. Extreities without edema, deformity or tenderness to touch. No rashes appreciated. Will check labs, CXR to r/o superimposed bacterial PNA, IVF

## 2022-07-10 NOTE — ED PROVIDER NOTE - NS ED ROS FT
REVIEW OF SYSTEMS:  CONSTITUTIONAL: no fevers or chills, + fatigue  EYES/ENT: No visual changes  NECK: No pain or stiffness  RESPIRATORY: + intermittent cough, + shortness of breath  CARDIOVASCULAR: No chest pain or palpitations  GASTROINTESTINAL: No abdominal or epigastric pain. No nausea, vomiting, + diarrhea, no constipation. No melena or hematochezia.  GENITOURINARY: No dysuria, frequency or hematuria  NEUROLOGICAL: No numbness, + leg weakness, + dizziness  MSK: + left upper back pain  SKIN: No itching, rashes

## 2022-07-10 NOTE — ED PROVIDER NOTE - PROGRESS NOTE DETAILS
Jefry MERCADO: Pt signed out to me.  Hx recent covid 19 infection, cont gen weakness and malaise since diagnosis.  No resp distress or hypoxia, labs wnl and lactate downtrending with IVFs.  Stable for dc home and to cont supportive care as outpatient.

## 2022-07-11 VITALS
DIASTOLIC BLOOD PRESSURE: 91 MMHG | HEART RATE: 92 BPM | TEMPERATURE: 99 F | RESPIRATION RATE: 12 BRPM | SYSTOLIC BLOOD PRESSURE: 169 MMHG | OXYGEN SATURATION: 100 %

## 2022-07-11 LAB
BASE EXCESS BLDV CALC-SCNC: -3.5 MMOL/L — LOW (ref -2–3)
BLOOD GAS VENOUS COMPREHENSIVE RESULT: SIGNIFICANT CHANGE UP
CHLORIDE BLDV-SCNC: 107 MMOL/L — SIGNIFICANT CHANGE UP (ref 96–108)
CO2 BLDV-SCNC: 21.1 MMOL/L — LOW (ref 22–26)
GAS PNL BLDV: 133 MMOL/L — LOW (ref 136–145)
GAS PNL BLDV: SIGNIFICANT CHANGE UP
GLUCOSE BLDV-MCNC: 161 MG/DL — HIGH (ref 70–99)
HCO3 BLDV-SCNC: 20 MMOL/L — LOW (ref 22–29)
HCT VFR BLDA CALC: 36 % — SIGNIFICANT CHANGE UP (ref 34.5–46.5)
HGB BLD CALC-MCNC: 11.9 G/DL — SIGNIFICANT CHANGE UP (ref 11.5–15.5)
LACTATE BLDV-MCNC: 3.6 MMOL/L — HIGH (ref 0.5–2)
PCO2 BLDV: 31 MMHG — LOW (ref 39–42)
PH BLDV: 7.42 — SIGNIFICANT CHANGE UP (ref 7.32–7.43)
PO2 BLDV: 63 MMHG — SIGNIFICANT CHANGE UP
POTASSIUM BLDV-SCNC: 3.4 MMOL/L — LOW (ref 3.5–5.1)
SAO2 % BLDV: 91.9 % — SIGNIFICANT CHANGE UP

## 2022-07-14 ENCOUNTER — APPOINTMENT (OUTPATIENT)
Dept: PULMONOLOGY | Facility: CLINIC | Age: 82
End: 2022-07-14
Payer: MEDICARE

## 2022-07-31 ENCOUNTER — NON-APPOINTMENT (OUTPATIENT)
Age: 82
End: 2022-07-31

## 2022-08-01 ENCOUNTER — APPOINTMENT (OUTPATIENT)
Dept: PULMONOLOGY | Facility: CLINIC | Age: 82
End: 2022-08-01
Payer: MEDICARE

## 2022-08-01 VITALS
TEMPERATURE: 98.2 F | DIASTOLIC BLOOD PRESSURE: 75 MMHG | HEART RATE: 94 BPM | BODY MASS INDEX: 23.63 KG/M2 | WEIGHT: 147 LBS | HEIGHT: 66 IN | SYSTOLIC BLOOD PRESSURE: 102 MMHG | OXYGEN SATURATION: 99 %

## 2022-08-01 LAB
BASOPHILS # BLD AUTO: 0.04 K/UL
BASOPHILS NFR BLD AUTO: 0.5 %
EOSINOPHIL # BLD AUTO: 0.15 K/UL
EOSINOPHIL NFR BLD AUTO: 1.8 %
HCT VFR BLD CALC: 39.8 %
HGB BLD-MCNC: 12.9 G/DL
IMM GRANULOCYTES NFR BLD AUTO: 0.4 %
LYMPHOCYTES # BLD AUTO: 2.73 K/UL
LYMPHOCYTES NFR BLD AUTO: 32 %
MAN DIFF?: NORMAL
MCHC RBC-ENTMCNC: 27.9 PG
MCHC RBC-ENTMCNC: 32.4 GM/DL
MCV RBC AUTO: 86.1 FL
MONOCYTES # BLD AUTO: 0.75 K/UL
MONOCYTES NFR BLD AUTO: 8.8 %
NEUTROPHILS # BLD AUTO: 4.82 K/UL
NEUTROPHILS NFR BLD AUTO: 56.5 %
PLATELET # BLD AUTO: 249 K/UL
RBC # BLD: 4.62 M/UL
RBC # FLD: 13.2 %
SARS-COV-2 AG RESP QL IA.RAPID: NEGATIVE
WBC # FLD AUTO: 8.52 K/UL

## 2022-08-01 PROCEDURE — 99213 OFFICE O/P EST LOW 20 MIN: CPT | Mod: CS,25

## 2022-08-01 PROCEDURE — 94618 PULMONARY STRESS TESTING: CPT

## 2022-08-01 PROCEDURE — 87811 SARS-COV-2 COVID19 W/OPTIC: CPT

## 2022-08-01 PROCEDURE — 71046 X-RAY EXAM CHEST 2 VIEWS: CPT

## 2022-08-01 RX ORDER — DEXAMETHASONE 2 MG/1
2 TABLET ORAL
Qty: 15 | Refills: 0 | Status: DISCONTINUED | COMMUNITY
Start: 2022-07-08 | End: 2022-08-01

## 2022-08-02 LAB
ALBUMIN SERPL ELPH-MCNC: 4.3 G/DL
ALP BLD-CCNC: 106 U/L
ALT SERPL-CCNC: 15 U/L
ANION GAP SERPL CALC-SCNC: 13 MMOL/L
AST SERPL-CCNC: 16 U/L
BILIRUB SERPL-MCNC: 0.5 MG/DL
BUN SERPL-MCNC: 20 MG/DL
CALCIUM SERPL-MCNC: 10 MG/DL
CHLORIDE SERPL-SCNC: 104 MMOL/L
CO2 SERPL-SCNC: 22 MMOL/L
COVID-19 NUCLEOCAPSID  GAM ANTIBODY INTERPRETATION: POSITIVE
COVID-19 SPIKE DOMAIN ANTIBODY INTERPRETATION: POSITIVE
CREAT SERPL-MCNC: 1.09 MG/DL
CRP SERPL-MCNC: <3 MG/L
EGFR: 51 ML/MIN/1.73M2
FERRITIN SERPL-MCNC: 51 NG/ML
GLUCOSE SERPL-MCNC: 147 MG/DL
POTASSIUM SERPL-SCNC: 4.6 MMOL/L
PROCALCITONIN SERPL-MCNC: 0.02 NG/ML
PROT SERPL-MCNC: 6.8 G/DL
SARS-COV-2 AB SERPL IA-ACNC: 239 U/ML
SARS-COV-2 AB SERPL QL IA: 50.7 INDEX
SODIUM SERPL-SCNC: 139 MMOL/L

## 2022-08-02 NOTE — ASSESSMENT
[FreeTextEntry1] : Recovering from COVID.  Chest x-ray with nonspecific abnormality recommend repeat x-ray 1 month.

## 2022-08-02 NOTE — HISTORY OF PRESENT ILLNESS
[TextBox_4] : Follow-up for COPD and post COVID.  Some shortness of breath but improving.\par Requested rapid COVID test to determine if she is no longer infectious

## 2022-09-01 ENCOUNTER — APPOINTMENT (OUTPATIENT)
Dept: PULMONOLOGY | Facility: CLINIC | Age: 82
End: 2022-09-01

## 2022-09-01 VITALS — HEART RATE: 72 BPM | TEMPERATURE: 97.8 F | DIASTOLIC BLOOD PRESSURE: 67 MMHG | SYSTOLIC BLOOD PRESSURE: 108 MMHG

## 2022-09-01 DIAGNOSIS — U07.1 COVID-19: ICD-10-CM

## 2022-09-01 PROCEDURE — 71046 X-RAY EXAM CHEST 2 VIEWS: CPT

## 2022-09-01 PROCEDURE — 99213 OFFICE O/P EST LOW 20 MIN: CPT | Mod: CS

## 2022-09-02 PROBLEM — U07.1 COVID-19: Status: ACTIVE | Noted: 2022-07-08

## 2022-09-02 NOTE — HISTORY OF PRESENT ILLNESS
[Never] : never [TextBox_4] : Follow-up for COPD and post COVID.  Some shortness of breath but improving.\par Noted to have abnormal chest x-ray at last visit here for follow-up.  Concerned regarding emphysema history was not a smoker.  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

## 2022-09-02 NOTE — ASSESSMENT
[FreeTextEntry1] : Recovering from COVID still with some fatigue but improving advised to increase exercise.  Breathing is better on Anoro inhaler and should continue this.  Did bring up issue of emphysema without smoking history alpha testing performed today

## 2022-10-29 ENCOUNTER — EMERGENCY (EMERGENCY)
Facility: HOSPITAL | Age: 82
LOS: 1 days | Discharge: HOME | End: 2022-10-29
Attending: EMERGENCY MEDICINE | Admitting: EMERGENCY MEDICINE

## 2022-10-29 VITALS
TEMPERATURE: 98 F | HEIGHT: 66 IN | DIASTOLIC BLOOD PRESSURE: 66 MMHG | HEART RATE: 60 BPM | OXYGEN SATURATION: 100 % | SYSTOLIC BLOOD PRESSURE: 149 MMHG | RESPIRATION RATE: 16 BRPM

## 2022-10-29 VITALS
OXYGEN SATURATION: 98 % | RESPIRATION RATE: 17 BRPM | SYSTOLIC BLOOD PRESSURE: 152 MMHG | HEART RATE: 60 BPM | DIASTOLIC BLOOD PRESSURE: 91 MMHG

## 2022-10-29 PROCEDURE — 70450 CT HEAD/BRAIN W/O DYE: CPT | Mod: 26,MA

## 2022-10-29 PROCEDURE — 99284 EMERGENCY DEPT VISIT MOD MDM: CPT | Mod: GC

## 2022-10-29 PROCEDURE — 70486 CT MAXILLOFACIAL W/O DYE: CPT | Mod: 26,MA

## 2022-10-29 PROCEDURE — 72125 CT NECK SPINE W/O DYE: CPT | Mod: 26,MA

## 2022-10-29 RX ORDER — TETANUS TOXOID, REDUCED DIPHTHERIA TOXOID AND ACELLULAR PERTUSSIS VACCINE, ADSORBED 5; 2.5; 8; 8; 2.5 [IU]/.5ML; [IU]/.5ML; UG/.5ML; UG/.5ML; UG/.5ML
0.5 SUSPENSION INTRAMUSCULAR ONCE
Refills: 0 | Status: COMPLETED | OUTPATIENT
Start: 2022-10-29 | End: 2022-10-29

## 2022-10-29 RX ORDER — ACETAMINOPHEN 500 MG
650 TABLET ORAL ONCE
Refills: 0 | Status: COMPLETED | OUTPATIENT
Start: 2022-10-29 | End: 2022-10-29

## 2022-10-29 RX ADMIN — Medication 650 MILLIGRAM(S): at 18:54

## 2022-10-29 RX ADMIN — TETANUS TOXOID, REDUCED DIPHTHERIA TOXOID AND ACELLULAR PERTUSSIS VACCINE, ADSORBED 0.5 MILLILITER(S): 5; 2.5; 8; 8; 2.5 SUSPENSION INTRAMUSCULAR at 13:54

## 2022-10-29 RX ADMIN — Medication 650 MILLIGRAM(S): at 13:42

## 2022-10-29 NOTE — ED PROVIDER NOTE - NS ED ROS FT
CONSTITUTIONAL: No fevers, no chills, no lightheadedness, no dizziness  EYES: no visual changes, no eye pain  EARS: no ear drainage, no ear pain, no change in hearing  NOSE: no nasal congestion  MOUTH/THROAT: no sore throat  CV: No chest pain, no palpitations  RESP: No SOB, no cough  GI: No n/v/d, no abd pain  : no dysuria, no hematuria, no flank pain  MSK: no back pain, +extremity pain  SKIN: no rashes  NEURO: +headache, no focal weakness, no decreased sensation/parasthesias   PSYCHIATRIC: no known mental health issues

## 2022-10-29 NOTE — ED PROVIDER NOTE - PATIENT PORTAL LINK FT
You can access the FollowMyHealth Patient Portal offered by [facility] by registering at the following website: [email or web address]. By joining [facility]’s FollowMyHealth portal, you will also be able to view your health information using other applications (apps) compatible with our system.

## 2022-10-29 NOTE — ED ADULT TRIAGE NOTE - CHIEF COMPLAINT QUOTE
brought in by EMS from [facility] for s/p trip and fall on curb while catching bus. Hit face on pavement. Denies LOC. Abrasions to nose bridge, bruising and swelling to forehead. Deformity noted to nose. Hx PPM on Eliquis. Hx DM2.

## 2022-10-29 NOTE — ED ADULT NURSE REASSESSMENT NOTE - NS ED NURSE REASSESS COMMENT FT1
pt lying comfortably in bed at this time. pt at baseline mental status, respirations even and unlabored completing full sentences. paced rhythm on the cardiac monitor. no complaints at this time. bed in lowest position, siderails up, family at bedside. awaiting CT.

## 2022-10-29 NOTE — ED PROVIDER NOTE - ATTENDING CONTRIBUTION TO CARE
The patient is a 82y Female who has a past medical and surgery history of AF bradycardia  PPM tonsillectomy DM HTN HLD Hernia repair cataract surgery Oophorectomy L breast cyst removal PTED after a fall from standing while attempting to catch a bus as described   Vital Signs   PE: as described; small abrasion to forehead; some dried blood right nares mild right neck tenderness     DATA:  LAB: Pending at time of evaluation    IMPRESSION/RISK:  Dx= non syncopal head injury    Consideration include Head c spine ct necessary  Plan  will perform ct   analagesics   reassess  dispo as per results and response

## 2022-10-29 NOTE — ED ADULT NURSE NOTE - OBJECTIVE STATEMENT
pt received in rm 12. A&Ox4, respirations even and unlabored, completing full sentences. c/o fall on eliquis. pt states she was walking and tripped, landing on her face. laceration noted to the nose, swelling and bruise noted to the forehead. no active bleeding at this time. no facial droop, slurred speech noted. pt denies cp, n/v/d, lightheadedness, dizziness. pt ambulatory at baseline, walked to the bathroom, gait is steady. NSR on monitor. bed in lowest position, siderails up. awaiting MD bermudez. pt received in rm 12. A&Ox4, respirations even and unlabored, completing full sentences. c/o fall on eliquis. pt states she was walking and tripped, landing on her face. laceration noted to the nose, swelling and bruise noted to the forehead. no active bleeding at this time. no facial droop, slurred speech noted. pt denies cp, n/v/d, lightheadedness, dizziness. pt ambulatory at baseline, walked to the bathroom, gait is steady. paced rhythm on monitor, HR 60. bed in lowest position, siderails up. awaiting MD bermudez.

## 2022-10-29 NOTE — ED PROVIDER NOTE - OBJECTIVE STATEMENT
81yo F, hx of ALázaro kimbrough on Eliquis, bradycardia, pacemaker, presents after a fall from standing height. Patient reports that 2 hours ago, she was coming from the pharmacy and sped-walk to catch her bus. She tripped and fell face forward onto the concrete. No LOC, nausea or vomiting, able to ambulate afterwards. Currently has a headache and nose pain. Claims to have never had a tetanus vaccine.

## 2022-10-29 NOTE — ED PROVIDER NOTE - CLINICAL SUMMARY MEDICAL DECISION MAKING FREE TEXT BOX
83yo F, hx of A. fib on Eliquis, bradycardia, pacemaker, presents after a fall from standing height onto the concrete while rushing to catch a bus. Vitals unremarkable. Well-appearing overall, neuro exam wnl. Will order CTH, neck, and maxface to evaluate for bleeding and tylenol for pain.

## 2022-10-29 NOTE — ED PROVIDER NOTE - CARE PLAN
1 Principal Discharge DX:	Fall with injury   Principal Discharge DX:	Head injury  Secondary Diagnosis:	Accidental fall

## 2022-10-29 NOTE — ED PROVIDER NOTE - PROGRESS NOTE DETAILS
Garrett PGY1: Patient reassessed. Still is experiencing a headache but nosebleed has stopped. Refuses additional tylenol. No complaints. Awaiting CT scans.

## 2022-10-29 NOTE — ED ADULT NURSE NOTE - NSIMPLEMENTINTERV_GEN_ALL_ED
Implemented All Fall Risk Interventions:  [place] to call system. Call bell, personal items and telephone within reach. Instruct patient to call for assistance. Room bathroom lighting operational. Non-slip footwear when patient is off stretcher. Physically safe environment: no spills, clutter or unnecessary equipment. Stretcher in lowest position, wheels locked, appropriate side rails in place. Provide visual cue, wrist band, yellow gown, etc. Monitor gait and stability. Monitor for mental status changes and reorient to person, place, and time. Review medications for side effects contributing to fall risk. Reinforce activity limits and safety measures with patient and family.

## 2022-10-29 NOTE — ED ADULT NURSE NOTE - CHIEF COMPLAINT QUOTE
brought in by EMS from [facility] for s/p trip and fall on curb while catching bus. Hit face on pavement. Denies LOC. Abrasions to nose bridge, bruising and swelling to forehead. Deformity noted to nose. Hx PPM on Eliquis. Hx DM2.

## 2022-10-29 NOTE — ED PROVIDER NOTE - PHYSICAL EXAMINATION
Physical Exam:  Gen: NAD, AOx3, non-toxic appearing, able to ambulate without assistance  Head: band-like hematoma on forehead  HEENT: EOMI, PEERLA, normal conjunctiva, tongue midline, oral mucosa moist  Nose: 2mm abrasion on dorsum of nose, blood in the nares R > L; significant swelling, unable to palpate deformity  Lung: CTAB, no respiratory distress, no wheezes/rhonchi/rales B/L, speaking in full sentences  CV: RRR, no murmurs, rubs or gallops  Abd: soft, NT, ND, no guarding, no rigidity, no rebound tenderness, no CVA tenderness   MSK: no visible deformities, small hematomas on bilateral hands, ROM normal in UE/LE, no back pain  Neuro: No focal sensory or motor deficits  Skin: Warm, well perfused, no rash, no leg swelling  Psych: normal affect, calm

## 2022-10-29 NOTE — ED PROVIDER NOTE - NSFOLLOWUPCLINICS_GEN_ALL_ED_FT
[facility]  Plastic Surgery  [address], [facility] [address] [address]  [place] [address]  Phone: [phone]  Fax:

## 2022-12-06 ENCOUNTER — APPOINTMENT (OUTPATIENT)
Dept: PULMONOLOGY | Facility: CLINIC | Age: 82
End: 2022-12-06

## 2022-12-06 VITALS
OXYGEN SATURATION: 95 % | HEART RATE: 61 BPM | BODY MASS INDEX: 23.95 KG/M2 | HEIGHT: 66 IN | DIASTOLIC BLOOD PRESSURE: 79 MMHG | WEIGHT: 149 LBS | SYSTOLIC BLOOD PRESSURE: 137 MMHG

## 2022-12-06 PROCEDURE — 99214 OFFICE O/P EST MOD 30 MIN: CPT | Mod: 25

## 2022-12-06 PROCEDURE — 94010 BREATHING CAPACITY TEST: CPT

## 2022-12-06 PROCEDURE — 71046 X-RAY EXAM CHEST 2 VIEWS: CPT

## 2022-12-06 PROCEDURE — 93000 ELECTROCARDIOGRAM COMPLETE: CPT

## 2022-12-06 NOTE — HISTORY OF PRESENT ILLNESS
[TextBox_4] : Reports worsening shortness of breath denies cough or congestion did not report fever or chills.  No leg swelling reported no chest pain

## 2022-12-08 ENCOUNTER — NON-APPOINTMENT (OUTPATIENT)
Age: 82
End: 2022-12-08

## 2022-12-08 LAB
ALBUMIN SERPL ELPH-MCNC: 4.6 G/DL
ALP BLD-CCNC: 93 U/L
ALT SERPL-CCNC: 14 U/L
ANION GAP SERPL CALC-SCNC: 14 MMOL/L
AST SERPL-CCNC: 19 U/L
BASOPHILS # BLD AUTO: 0.05 K/UL
BASOPHILS NFR BLD AUTO: 0.8 %
BILIRUB SERPL-MCNC: 0.6 MG/DL
BUN SERPL-MCNC: 21 MG/DL
CALCIUM SERPL-MCNC: 9.7 MG/DL
CHLORIDE SERPL-SCNC: 102 MMOL/L
CO2 SERPL-SCNC: 22 MMOL/L
CREAT SERPL-MCNC: 1.09 MG/DL
DEPRECATED D DIMER PPP IA-ACNC: <150 NG/ML DDU
EGFR: 51 ML/MIN/1.73M2
EOSINOPHIL # BLD AUTO: 0.1 K/UL
EOSINOPHIL NFR BLD AUTO: 1.6 %
GLUCOSE SERPL-MCNC: 132 MG/DL
HCT VFR BLD CALC: 37.5 %
HGB BLD-MCNC: 12.1 G/DL
IMM GRANULOCYTES NFR BLD AUTO: 0.3 %
LYMPHOCYTES # BLD AUTO: 2 K/UL
LYMPHOCYTES NFR BLD AUTO: 32.1 %
MAN DIFF?: NORMAL
MCHC RBC-ENTMCNC: 28.7 PG
MCHC RBC-ENTMCNC: 32.3 GM/DL
MCV RBC AUTO: 89.1 FL
MONOCYTES # BLD AUTO: 0.53 K/UL
MONOCYTES NFR BLD AUTO: 8.5 %
NEUTROPHILS # BLD AUTO: 3.54 K/UL
NEUTROPHILS NFR BLD AUTO: 56.7 %
NT-PROBNP SERPL-MCNC: 366 PG/ML
PLATELET # BLD AUTO: 222 K/UL
POTASSIUM SERPL-SCNC: 4.4 MMOL/L
PROT SERPL-MCNC: 6.7 G/DL
RBC # BLD: 4.21 M/UL
RBC # FLD: 13 %
SODIUM SERPL-SCNC: 138 MMOL/L
WBC # FLD AUTO: 6.24 K/UL

## 2022-12-13 NOTE — H&P ADULT - NSHPSOCIALHISTORY_GEN_ALL_CORE
Juliana Chavez from [facility] wanted to know what test are needed for pts medical clearance prior to her procedure dorothea DENISE spoke to Juliana Chavez Nonsmoker, No ETOH, No IVDU. Lives alone.

## 2022-12-15 NOTE — ED ADULT TRIAGE NOTE - INTERNATIONAL TRAVEL
Infusion Nursing Note:  Jennifer Cervantes presents today for IVF/pain medication.    Patient seen by provider today: No   present during visit today: Not Applicable.    Note: Patient arrived at infusion endorses constant sharp left neck, shoulder and arm pain with PS 9/10. Patient states that this is her usual sickle cell crisis pain. No new concerns made. Otherwise well.     Upon discharge patient had 3 doses of Morphine with PS 3/10. Patient agreed to go home and verbalized knowledge on where and when to call if symptoms persists.     Intravenous Access:  Implanted Port.    Treatment Conditions:  Not Applicable.    Post Infusion Assessment:  Patient tolerated infusion without incident.  Blood return noted pre and post infusion.  Site patent and intact, free from redness, edema or discomfort.  No evidence of extravasations.  Access discontinued per protocol.     Discharge Plan:   Prescription refills given for Oxycodone.  Discharge instructions reviewed with: Patient.  Patient and/or family verbalized understanding of discharge instructions and all questions answered.  AVS to patient via [facility].  Patient will return  for next appointment. See notes above.   Patient discharged in stable condition accompanied by: self.  Departure Mode: Ambulatory.      GLORIA YANCEY RN                      
No

## 2023-03-27 ENCOUNTER — APPOINTMENT (OUTPATIENT)
Dept: PULMONOLOGY | Facility: CLINIC | Age: 83
End: 2023-03-27
Payer: MEDICARE

## 2023-03-27 VITALS — DIASTOLIC BLOOD PRESSURE: 68 MMHG | SYSTOLIC BLOOD PRESSURE: 116 MMHG | HEART RATE: 69 BPM | OXYGEN SATURATION: 99 %

## 2023-03-27 DIAGNOSIS — R93.89 ABNORMAL FINDINGS ON DIAGNOSTIC IMAGING OF OTHER SPECIFIED BODY STRUCTURES: ICD-10-CM

## 2023-03-27 PROCEDURE — 99213 OFFICE O/P EST LOW 20 MIN: CPT

## 2023-03-28 PROBLEM — R93.89 ABNORMAL CHEST CT: Status: ACTIVE | Noted: 2023-03-28

## 2023-03-28 NOTE — ASSESSMENT
[FreeTextEntry1] : Ms. COMPA ROJAS is an 82 year old female, history of emphysema. Recent chest imaging indicates a trivial amount of pleural effusion and is not likely the cause of her chest pain.  Evaded calcium score noted.  Advised formal angiography for disease assessment

## 2023-03-28 NOTE — ADDENDUM
[FreeTextEntry1] : I, Evelina Ortizrupinder, acted solely as a scribe for Dr. Aman Nunez M.D. on this date 03/27/2023. \par \par All medical record entries made by the Scribe were at my, Dr. Aman Nunez M.D., direction and personally dictated by me on 03/27/2023. I have reviewed the chart and agree that the record accurately reflects my personal performance of the history, physical exam, assessment and plan. I have also personally directed, reviewed, and agreed with the chart.

## 2023-03-28 NOTE — HISTORY OF PRESENT ILLNESS
[TextBox_4] : COMPA ROJAS is a 82 year old female, with history of emphysema, who presents to the office for follow up evaluation. Patient reports that her cardiologist advised her to follow with her pulmonologist for a second opinion of receiving an angiogram for her chest pain.  She underwent a cardiac chest CT March 13.  This showed an elevated calcium score and a pleural effusion

## 2023-03-31 ENCOUNTER — NON-APPOINTMENT (OUTPATIENT)
Age: 83
End: 2023-03-31

## 2023-04-03 ENCOUNTER — INPATIENT (INPATIENT)
Facility: HOSPITAL | Age: 83
LOS: 0 days | Discharge: HOME | DRG: 247 | End: 2023-04-04
Attending: INTERNAL MEDICINE | Admitting: INTERNAL MEDICINE
Payer: COMMERCIAL

## 2023-04-03 ENCOUNTER — TRANSCRIPTION ENCOUNTER (OUTPATIENT)
Age: 83
End: 2023-04-03

## 2023-04-03 VITALS
DIASTOLIC BLOOD PRESSURE: 72 MMHG | RESPIRATION RATE: 20 BRPM | HEART RATE: 58 BPM | TEMPERATURE: 98 F | HEIGHT: 66 IN | SYSTOLIC BLOOD PRESSURE: 164 MMHG | WEIGHT: 147.05 LBS | OXYGEN SATURATION: 58 %

## 2023-04-03 DIAGNOSIS — R07.89 OTHER CHEST PAIN: ICD-10-CM

## 2023-04-03 LAB
ALBUMIN SERPL ELPH-MCNC: 4.6 G/DL — SIGNIFICANT CHANGE UP (ref 3.3–5)
ALP SERPL-CCNC: 83 U/L — SIGNIFICANT CHANGE UP (ref 40–120)
ALT FLD-CCNC: 16 U/L — SIGNIFICANT CHANGE UP (ref 10–45)
ANION GAP SERPL CALC-SCNC: 13 MMOL/L — SIGNIFICANT CHANGE UP (ref 5–17)
AST SERPL-CCNC: 17 U/L — SIGNIFICANT CHANGE UP (ref 10–40)
BILIRUB SERPL-MCNC: 0.9 MG/DL — SIGNIFICANT CHANGE UP (ref 0.2–1.2)
BUN SERPL-MCNC: 23 MG/DL — SIGNIFICANT CHANGE UP (ref 7–23)
CALCIUM SERPL-MCNC: 10 MG/DL — SIGNIFICANT CHANGE UP (ref 8.4–10.5)
CHLORIDE SERPL-SCNC: 101 MMOL/L — SIGNIFICANT CHANGE UP (ref 96–108)
CO2 SERPL-SCNC: 23 MMOL/L — SIGNIFICANT CHANGE UP (ref 22–31)
CREAT SERPL-MCNC: 1.05 MG/DL — SIGNIFICANT CHANGE UP (ref 0.5–1.3)
EGFR: 53 ML/MIN/1.73M2 — LOW
GLUCOSE BLDC GLUCOMTR-MCNC: 100 MG/DL — HIGH (ref 70–99)
GLUCOSE BLDC GLUCOMTR-MCNC: 129 MG/DL — HIGH (ref 70–99)
GLUCOSE BLDC GLUCOMTR-MCNC: 137 MG/DL — HIGH (ref 70–99)
GLUCOSE BLDC GLUCOMTR-MCNC: 141 MG/DL — HIGH (ref 70–99)
GLUCOSE SERPL-MCNC: 148 MG/DL — HIGH (ref 70–99)
HCT VFR BLD CALC: 37.6 % — SIGNIFICANT CHANGE UP (ref 34.5–45)
HGB BLD-MCNC: 12.9 G/DL — SIGNIFICANT CHANGE UP (ref 11.5–15.5)
MCHC RBC-ENTMCNC: 29.3 PG — SIGNIFICANT CHANGE UP (ref 27–34)
MCHC RBC-ENTMCNC: 34.3 GM/DL — SIGNIFICANT CHANGE UP (ref 32–36)
MCV RBC AUTO: 85.5 FL — SIGNIFICANT CHANGE UP (ref 80–100)
NRBC # BLD: 0 /100 WBCS — SIGNIFICANT CHANGE UP (ref 0–0)
PLATELET # BLD AUTO: 198 K/UL — SIGNIFICANT CHANGE UP (ref 150–400)
POTASSIUM SERPL-MCNC: 4.2 MMOL/L — SIGNIFICANT CHANGE UP (ref 3.5–5.3)
POTASSIUM SERPL-SCNC: 4.2 MMOL/L — SIGNIFICANT CHANGE UP (ref 3.5–5.3)
PROT SERPL-MCNC: 7.1 G/DL — SIGNIFICANT CHANGE UP (ref 6–8.3)
RBC # BLD: 4.4 M/UL — SIGNIFICANT CHANGE UP (ref 3.8–5.2)
RBC # FLD: 12.6 % — SIGNIFICANT CHANGE UP (ref 10.3–14.5)
SODIUM SERPL-SCNC: 137 MMOL/L — SIGNIFICANT CHANGE UP (ref 135–145)
WBC # BLD: 7.7 K/UL — SIGNIFICANT CHANGE UP (ref 3.8–10.5)
WBC # FLD AUTO: 7.7 K/UL — SIGNIFICANT CHANGE UP (ref 3.8–10.5)

## 2023-04-03 PROCEDURE — 92928 PRQ TCAT PLMT NTRAC ST 1 LES: CPT | Mod: RC

## 2023-04-03 PROCEDURE — 93010 ELECTROCARDIOGRAM REPORT: CPT | Mod: 77

## 2023-04-03 PROCEDURE — 93010 ELECTROCARDIOGRAM REPORT: CPT

## 2023-04-03 PROCEDURE — 0715T: CPT

## 2023-04-03 PROCEDURE — 92978 ENDOLUMINL IVUS OCT C 1ST: CPT | Mod: 26,RC

## 2023-04-03 PROCEDURE — 93454 CORONARY ARTERY ANGIO S&I: CPT | Mod: 26,59

## 2023-04-03 PROCEDURE — 99152 MOD SED SAME PHYS/QHP 5/>YRS: CPT

## 2023-04-03 RX ORDER — ASPIRIN/CALCIUM CARB/MAGNESIUM 324 MG
81 TABLET ORAL DAILY
Refills: 0 | Status: DISCONTINUED | OUTPATIENT
Start: 2023-04-03 | End: 2023-04-04

## 2023-04-03 RX ORDER — SODIUM CHLORIDE 9 MG/ML
1000 INJECTION INTRAMUSCULAR; INTRAVENOUS; SUBCUTANEOUS
Refills: 0 | Status: DISCONTINUED | OUTPATIENT
Start: 2023-04-03 | End: 2023-04-04

## 2023-04-03 RX ORDER — INSULIN LISPRO 100/ML
VIAL (ML) SUBCUTANEOUS AT BEDTIME
Refills: 0 | Status: DISCONTINUED | OUTPATIENT
Start: 2023-04-03 | End: 2023-04-04

## 2023-04-03 RX ORDER — INSULIN LISPRO 100/ML
VIAL (ML) SUBCUTANEOUS
Refills: 0 | Status: DISCONTINUED | OUTPATIENT
Start: 2023-04-03 | End: 2023-04-04

## 2023-04-03 RX ORDER — DEXTROSE 50 % IN WATER 50 %
15 SYRINGE (ML) INTRAVENOUS ONCE
Refills: 0 | Status: DISCONTINUED | OUTPATIENT
Start: 2023-04-03 | End: 2023-04-04

## 2023-04-03 RX ORDER — DEXTROSE 50 % IN WATER 50 %
12.5 SYRINGE (ML) INTRAVENOUS ONCE
Refills: 0 | Status: DISCONTINUED | OUTPATIENT
Start: 2023-04-03 | End: 2023-04-04

## 2023-04-03 RX ORDER — AMLODIPINE BESYLATE 2.5 MG/1
2.5 TABLET ORAL DAILY
Refills: 0 | Status: DISCONTINUED | OUTPATIENT
Start: 2023-04-03 | End: 2023-04-04

## 2023-04-03 RX ORDER — DEXTROSE 50 % IN WATER 50 %
25 SYRINGE (ML) INTRAVENOUS ONCE
Refills: 0 | Status: DISCONTINUED | OUTPATIENT
Start: 2023-04-03 | End: 2023-04-04

## 2023-04-03 RX ORDER — ATORVASTATIN CALCIUM 80 MG/1
40 TABLET, FILM COATED ORAL AT BEDTIME
Refills: 0 | Status: DISCONTINUED | OUTPATIENT
Start: 2023-04-03 | End: 2023-04-04

## 2023-04-03 RX ORDER — CLOPIDOGREL BISULFATE 75 MG/1
1 TABLET, FILM COATED ORAL
Qty: 90 | Refills: 1
Start: 2023-04-03 | End: 2023-09-29

## 2023-04-03 RX ORDER — PROPAFENONE HCL 150 MG
225 TABLET ORAL EVERY 12 HOURS
Refills: 0 | Status: DISCONTINUED | OUTPATIENT
Start: 2023-04-03 | End: 2023-04-04

## 2023-04-03 RX ORDER — GLUCAGON INJECTION, SOLUTION 0.5 MG/.1ML
1 INJECTION, SOLUTION SUBCUTANEOUS ONCE
Refills: 0 | Status: DISCONTINUED | OUTPATIENT
Start: 2023-04-03 | End: 2023-04-04

## 2023-04-03 RX ORDER — SODIUM CHLORIDE 9 MG/ML
1000 INJECTION, SOLUTION INTRAVENOUS
Refills: 0 | Status: DISCONTINUED | OUTPATIENT
Start: 2023-04-03 | End: 2023-04-04

## 2023-04-03 RX ORDER — SODIUM CHLORIDE 9 MG/ML
250 INJECTION INTRAMUSCULAR; INTRAVENOUS; SUBCUTANEOUS ONCE
Refills: 0 | Status: DISCONTINUED | OUTPATIENT
Start: 2023-04-03 | End: 2023-04-04

## 2023-04-03 RX ORDER — ASPIRIN/CALCIUM CARB/MAGNESIUM 324 MG
1 TABLET ORAL
Qty: 7 | Refills: 0
Start: 2023-04-03 | End: 2023-04-08

## 2023-04-03 RX ORDER — CLOPIDOGREL BISULFATE 75 MG/1
75 TABLET, FILM COATED ORAL DAILY
Refills: 0 | Status: DISCONTINUED | OUTPATIENT
Start: 2023-04-04 | End: 2023-04-04

## 2023-04-03 RX ORDER — VALSARTAN 80 MG/1
320 TABLET ORAL DAILY
Refills: 0 | Status: DISCONTINUED | OUTPATIENT
Start: 2023-04-03 | End: 2023-04-04

## 2023-04-03 RX ORDER — METOPROLOL TARTRATE 50 MG
100 TABLET ORAL DAILY
Refills: 0 | Status: DISCONTINUED | OUTPATIENT
Start: 2023-04-03 | End: 2023-04-04

## 2023-04-03 RX ORDER — TIOTROPIUM BROMIDE 18 UG/1
2 CAPSULE ORAL; RESPIRATORY (INHALATION) DAILY
Refills: 0 | Status: DISCONTINUED | OUTPATIENT
Start: 2023-04-03 | End: 2023-04-04

## 2023-04-03 RX ADMIN — Medication 100 MILLIGRAM(S): at 12:13

## 2023-04-03 RX ADMIN — Medication 225 MILLIGRAM(S): at 17:15

## 2023-04-03 RX ADMIN — SODIUM CHLORIDE 100 MILLILITER(S): 9 INJECTION INTRAMUSCULAR; INTRAVENOUS; SUBCUTANEOUS at 15:20

## 2023-04-03 RX ADMIN — ATORVASTATIN CALCIUM 40 MILLIGRAM(S): 80 TABLET, FILM COATED ORAL at 20:15

## 2023-04-03 RX ADMIN — AMLODIPINE BESYLATE 2.5 MILLIGRAM(S): 2.5 TABLET ORAL at 12:13

## 2023-04-03 NOTE — PATIENT PROFILE ADULT - FALL HARM RISK - UNIVERSAL INTERVENTIONS
Bed in lowest position, wheels locked, appropriate side rails in place/Call bell, personal items and telephone in reach/Instruct patient to call for assistance before getting out of bed or chair/Non-slip footwear when patient is out of bed/[place] to call system/Physically safe environment - no spills, clutter or unnecessary equipment/Purposeful Proactive Rounding/Room/bathroom lighting operational, light cord in reach

## 2023-04-03 NOTE — PATIENT PROFILE ADULT - NSPROPTRIGHTSUPPORTPERSON_GEN_A_NUR
89F with HTN, DM, hypothyroidism admitted after coming to the Emergency Department with generalized fatigue, hardly getting out of bed, diagnosed with generalized anasarca and bilaterally pleural effusions and severe anemia. Course complicated by ADHF     Neuro  -A&Ox2 during the RRT, now resolved, ctm    Resp  -SOB 2/2 to pulmonary edema  -supplemental O2 if needed, goal SpO2 >92%    CV  #ADHF  TTE EF 40-45%, severe TR, RA enlargement  - likely secondary to labetalol toxicity (received 200mg)  - CXR shows bilateral pleural effusions  - on levophed and vasopressin, requirements are decreasing. Wean as tolerated.  - received 40 lasix at 10pm and then 80iv at ~5am. Given additional Metolazone 5mg PO and started on Bumex gtt @1mg/hr given poor UOP.  -  may add Primacor until Labetalol wears off  - monitor Is and Os, keep net negative. K>4, Mg>2  #Cardiogenic Shock  - Likely secondary to excessive beta blockade  - received glucagon x3 and then briefly put on glucagon gtt in the CCU but started dry heaving  - Increase  to 7.5  - CVP 12. CO 2.8 and CI 2.367 overnight  - holding home plavix right now for anemia    GI  -soft +consistent carb diet    ID  -no s/s of infection. no febrile, no cough, negative RVP    Renal  #EFRAIN   -likely secondary to fluid overload  -strict Is and Os (has adams)  -replete electrolytes as needed  -trend creatinine     Heme  #Anemia  - Likely 2/2 AOCD 2/2 long standing CKD  -Hb dropped 6.6, will receive 1 unit of pRBCs  -f/u iron studies, f/u haptoglobin (added on to lab)    Endo  #DM on oral agents  -c/w sliding scale insulin  #Hypothyroidism  -c/w levothyroxine  -f/u TFTs    DVT PPx  -SCDs because of anemia. 89F with HTN, DM, hypothyroidism admitted after coming to the Emergency Department with generalized fatigue, hardly getting out of bed, diagnosed with generalized anasarca and bilaterally pleural effusions and severe anemia. Course complicated by ADHF     Neuro  -A&Ox2 during the RRT, now resolved, ctm    Resp  -SOB 2/2 to pulmonary edema  -supplemental O2 if needed, goal SpO2 >92%    CV  #ADHF  TTE EF 40-45%, severe TR, RA enlargement  - likely secondary to labetalol toxicity (received 200mg)  - CXR shows bilateral pleural effusions  - on levophed and vasopressin, requirements are decreasing. Wean as tolerated.  - received 40 lasix at 10pm and then 80iv at ~5am. Given additional Metolazone 5mg PO and started on Bumex gtt @1mg/hr given poor UOP.  - Renal consulted for worsening acidosis and low UOP. Plan to start CRRT.  -  may add Primacor until Labetalol wears off  - monitor Is and Os, keep net negative. K>4, Mg>2  #Cardiogenic Shock  - Likely secondary to excessive beta blockade  - received glucagon x3 and then briefly put on glucagon gtt in the CCU but started dry heaving  - Increase  to 7.5  - holding home plavix 2/2 anemia  - Hold Statin given elevated LFTs    GI  -soft +consistent carb diet  - elevated LFTs likely 2/2 ADHF  - hold statin    ID  -no s/s of infection. no febrile, no cough, negative RVP    Renal  #EFRAIN   -likely secondary to fluid overload  -strict Is and Os (has adams)  -replete electrolytes as needed  -trend creatinine     Heme  #Anemia  - Likely 2/2 AOCD 2/2 long standing CKD  -Hb dropped 6.6, will receive 1 unit of pRBCs  -f/u iron studies, f/u haptoglobin (added on to lab)    Endo  #DM on oral agents  - Hold off on checking POCT as T2DM is well controlled  #Hypothyroidism  -c/w levothyroxine IV given bowel edema in the setting of ADHF  -f/u TFTs in 1 week    DVT PPx  -Hep subQ same name as above

## 2023-04-03 NOTE — H&P CARDIOLOGY - HISTORY OF PRESENT ILLNESS
This sis a 82 year old Female Jehovah's witness with PMHx of HTN, Afib on Eliquis, Tachy-Bryan syndrome s/p PPM, T2DM, referred for coronary angiogram by her outpatient cardiologist, Dr. Wiley Pacheco. Per patient, she has been experiencing chest pressure, mostly when bending forward and intermitted dyspnea on exertion, she had a recent CCTA heart with total calcium score 948. She denies any chest pain, dyspnea, palpitations during exam.

## 2023-04-03 NOTE — ASU PATIENT PROFILE, ADULT - FALL HARM RISK - UNIVERSAL INTERVENTIONS
Bed in lowest position, wheels locked, appropriate side rails in place/Call bell, personal items and telephone in reach/Instruct patient to call for assistance before getting out of bed or chair/Non-slip footwear when patient is out of bed/[place] to call system/Physically safe environment - no spills, clutter or unnecessary equipment/Purposeful Proactive Rounding/Room/bathroom lighting operational, light cord in reach

## 2023-04-04 ENCOUNTER — TRANSCRIPTION ENCOUNTER (OUTPATIENT)
Age: 83
End: 2023-04-04

## 2023-04-04 VITALS
RESPIRATION RATE: 17 BRPM | TEMPERATURE: 98 F | HEART RATE: 66 BPM | OXYGEN SATURATION: 98 % | SYSTOLIC BLOOD PRESSURE: 140 MMHG | DIASTOLIC BLOOD PRESSURE: 75 MMHG

## 2023-04-04 DIAGNOSIS — I48.91 UNSPECIFIED ATRIAL FIBRILLATION: ICD-10-CM

## 2023-04-04 DIAGNOSIS — I25.10 ATHEROSCLEROTIC HEART DISEASE OF NATIVE CORONARY ARTERY WITHOUT ANGINA PECTORIS: ICD-10-CM

## 2023-04-04 DIAGNOSIS — E78.5 HYPERLIPIDEMIA, UNSPECIFIED: ICD-10-CM

## 2023-04-04 DIAGNOSIS — I10 ESSENTIAL (PRIMARY) HYPERTENSION: ICD-10-CM

## 2023-04-04 LAB
ANION GAP SERPL CALC-SCNC: 12 MMOL/L — SIGNIFICANT CHANGE UP (ref 5–17)
BUN SERPL-MCNC: 24 MG/DL — HIGH (ref 7–23)
CALCIUM SERPL-MCNC: 9.1 MG/DL — SIGNIFICANT CHANGE UP (ref 8.4–10.5)
CHLORIDE SERPL-SCNC: 104 MMOL/L — SIGNIFICANT CHANGE UP (ref 96–108)
CO2 SERPL-SCNC: 23 MMOL/L — SIGNIFICANT CHANGE UP (ref 22–31)
CREAT SERPL-MCNC: 0.95 MG/DL — SIGNIFICANT CHANGE UP (ref 0.5–1.3)
EGFR: 60 ML/MIN/1.73M2 — SIGNIFICANT CHANGE UP
GLUCOSE BLDC GLUCOMTR-MCNC: 177 MG/DL — HIGH (ref 70–99)
GLUCOSE BLDC GLUCOMTR-MCNC: 280 MG/DL — HIGH (ref 70–99)
GLUCOSE SERPL-MCNC: 116 MG/DL — HIGH (ref 70–99)
POTASSIUM SERPL-MCNC: 3.7 MMOL/L — SIGNIFICANT CHANGE UP (ref 3.5–5.3)
POTASSIUM SERPL-SCNC: 3.7 MMOL/L — SIGNIFICANT CHANGE UP (ref 3.5–5.3)
SODIUM SERPL-SCNC: 139 MMOL/L — SIGNIFICANT CHANGE UP (ref 135–145)

## 2023-04-04 PROCEDURE — 92928 PRQ TCAT PLMT NTRAC ST 1 LES: CPT | Mod: LC

## 2023-04-04 PROCEDURE — 93571 IV DOP VEL&/PRESS C FLO 1ST: CPT | Mod: 26,LD

## 2023-04-04 PROCEDURE — 93010 ELECTROCARDIOGRAM REPORT: CPT

## 2023-04-04 PROCEDURE — 99152 MOD SED SAME PHYS/QHP 5/>YRS: CPT

## 2023-04-04 RX ORDER — POTASSIUM CHLORIDE 20 MEQ
20 PACKET (EA) ORAL ONCE
Refills: 0 | Status: COMPLETED | OUTPATIENT
Start: 2023-04-04 | End: 2023-04-04

## 2023-04-04 RX ORDER — SODIUM CHLORIDE 9 MG/ML
1000 INJECTION INTRAMUSCULAR; INTRAVENOUS; SUBCUTANEOUS
Refills: 0 | Status: DISCONTINUED | OUTPATIENT
Start: 2023-04-04 | End: 2023-04-04

## 2023-04-04 RX ORDER — ASPIRIN/CALCIUM CARB/MAGNESIUM 324 MG
1 TABLET ORAL
Qty: 7 | Refills: 0
Start: 2023-04-04 | End: 2023-04-09

## 2023-04-04 RX ORDER — CLOPIDOGREL BISULFATE 75 MG/1
1 TABLET, FILM COATED ORAL
Qty: 90 | Refills: 3
Start: 2023-04-04 | End: 2024-03-28

## 2023-04-04 RX ADMIN — Medication 225 MILLIGRAM(S): at 05:32

## 2023-04-04 RX ADMIN — Medication 1: at 07:39

## 2023-04-04 RX ADMIN — CLOPIDOGREL BISULFATE 75 MILLIGRAM(S): 75 TABLET, FILM COATED ORAL at 05:32

## 2023-04-04 RX ADMIN — VALSARTAN 320 MILLIGRAM(S): 80 TABLET ORAL at 05:31

## 2023-04-04 RX ADMIN — Medication 100 MILLIGRAM(S): at 11:30

## 2023-04-04 RX ADMIN — AMLODIPINE BESYLATE 2.5 MILLIGRAM(S): 2.5 TABLET ORAL at 11:30

## 2023-04-04 RX ADMIN — Medication 20 MILLIEQUIVALENT(S): at 05:33

## 2023-04-04 RX ADMIN — Medication 3: at 11:30

## 2023-04-04 RX ADMIN — TIOTROPIUM BROMIDE 2 PUFF(S): 18 CAPSULE ORAL; RESPIRATORY (INHALATION) at 11:31

## 2023-04-04 RX ADMIN — SODIUM CHLORIDE 120 MILLILITER(S): 9 INJECTION INTRAMUSCULAR; INTRAVENOUS; SUBCUTANEOUS at 09:52

## 2023-04-04 RX ADMIN — Medication 81 MILLIGRAM(S): at 05:32

## 2023-04-04 NOTE — DISCHARGE NOTE NURSING/CASE MANAGEMENT/SOCIAL WORK - NSDCVIVACCINE_GEN_ALL_CORE_FT
Tdap; 29-Oct-2022 13:54; Herbert Aguilar (NP); [employer]; [id number] (Exp. Date: 08-Dec-2024); IntraMuscular; Deltoid Left.; 0.5 milliLiter(s); VIS (VIS Published: 09-May-2013, VIS Presented: 29-Oct-2022);

## 2023-04-04 NOTE — DISCHARGE NOTE NURSING/CASE MANAGEMENT/SOCIAL WORK - PATIENT PORTAL LINK FT
You can access the FollowMyHealth Patient Portal offered by [facility] by registering at the following website: [email or web address]. By joining [facility]’s FollowMyHealth portal, you will also be able to view your health information using other applications (apps) compatible with our system.

## 2023-04-04 NOTE — DISCHARGE NOTE NURSING/CASE MANAGEMENT/SOCIAL WORK - NSDCPEFALRISK_GEN_ALL_CORE
For information on Fall & Injury Prevention, visit: [email or web address] OR  [email or web address] OR  https://www.cdc.gov/steadi/patient.html

## 2023-04-04 NOTE — DISCHARGE NOTE PROVIDER - NSDCMRMEDTOKEN_GEN_ALL_CORE_FT
acetaminophen 325 mg oral tablet: 2 tab(s) orally every 6 hours, As needed, Temp greater or equal to 38C (100.4F), Mild Pain (1 - 3)  amLODIPine 2.5 mg oral tablet: 1 orally once a day  Anoro Ellipta 62.5 mcg-25 mcg/inh inhalation powder: 1 puff(s) inhaled once a day  aspirin 81 mg oral delayed release tablet: 1 tab(s) orally once a day STOP AFTER 7 DAYS ON 4/11/23  atorvastatin 40 mg oral tablet: 1 tab(s) orally once a day  benzonatate 100 mg oral capsule: 1 cap(s) orally 3 times a day  clopidogrel 75 mg oral tablet: 1 tab(s) orally once a day  Diovan 320 mg oral tablet: 1 tab(s) orally once a day  Eliquis 5 mg oral tablet: 1 tab(s) orally 2 times a day  glimepiride 2 mg oral tablet: 1 orally once a day  metFORMIN 500 mg oral tablet: 1 tab(s) orally 2 times a day  Metoprolol Succinate  mg oral tablet, extended release: 1 tab(s) orally once a day  propafenone 225 mg oral capsule, extended release: 1 cap(s) orally every 12 hours  Victoza 18 mg/3 mL subcutaneous solution: 12 milligram(s) subcutaneously once a day   acetaminophen 325 mg oral tablet: 2 tab(s) orally every 6 hours, As needed, Temp greater or equal to 38C (100.4F), Mild Pain (1 - 3)  amLODIPine 2.5 mg oral tablet: 1 orally once a day  Anoro Ellipta 62.5 mcg-25 mcg/inh inhalation powder: 1 puff(s) inhaled once a day  aspirin 81 mg oral delayed release tablet: 1 tab(s) orally once a day STOP AFTER 7 DAYS ON 4/12/23  atorvastatin 40 mg oral tablet: 1 tab(s) orally once a day  benzonatate 100 mg oral capsule: 1 cap(s) orally 3 times a day  clopidogrel 75 mg oral tablet: 1 tab(s) orally once a day  Diovan 320 mg oral tablet: 1 tab(s) orally once a day  Eliquis 5 mg oral tablet: 1 tab(s) orally 2 times a day HOLD for 24 hrs, RESUME 4/5  glimepiride 2 mg oral tablet: 1 orally once a day  metFORMIN 500 mg oral tablet: 1 tab(s) orally 2 times a day HOLD for 48 hrs, RESUME 4/7  Metoprolol Succinate  mg oral tablet, extended release: 1 tab(s) orally once a day  propafenone 225 mg oral capsule, extended release: 1 cap(s) orally every 12 hours  Victoza 18 mg/3 mL subcutaneous solution: 12 milligram(s) subcutaneously once a day

## 2023-04-04 NOTE — PROGRESS NOTE ADULT - ASSESSMENT
#HTN,   #Afib on Eliquis  #Tachy-Bryan syndrome s/p PPM  #T2DM  #ASHD s/p stage stenting yesterday and today by Dr Forrester, rca yesterday, cx  today.  As discussed with Dr Forrester, to restart eliquis as per cath team, triple therapy (DAPT+eliquis) for one week, then eliquis and plavix.  
83 y/o female with above pmhx, Shockwave and one stent to the distal RCA on 4/3, staged PCI on 4/4.

## 2023-04-04 NOTE — DISCHARGE NOTE PROVIDER - NSDCCPCAREPLAN_GEN_ALL_CORE_FT
PRINCIPAL DISCHARGE DIAGNOSIS  Diagnosis: CAD (coronary artery disease)  Assessment and Plan of Treatment: Do not stop your aspirin or Plavix unless instructed to do so by your cardiologist, they help keep your stented arteries open.   No heavy lifting or pushing/pulling with procedure arm for 2 weeks. No driving for 2 days. You may shower 24 hours following the procedure but avoid baths/swimming for 1 week. Check your wrist site for bleeding and/or swelling daily following procedure and call your doctor immediately if it occurs or if you experience increased pain at the site. Follow up with your cardiologist in 1-2 weeks. You may call [facility] if you have any questions/concerns regarding your procedure [phone].      SECONDARY DISCHARGE DIAGNOSES  Diagnosis: Atrial fibrillation  Assessment and Plan of Treatment: Continue with your cardiologist and primary care MD. Continue your current medications. Call your physician for palpitations, feelings of rapid heart beat, lightheadedness, or dizziness. Continue Eliquis as prescribed.    Diagnosis: HTN (hypertension)  Assessment and Plan of Treatment: Continue with your blood pressure medications; eat a heart healthy diet with low salt diet; exercise regularly (consult with your physician or cardiologist first); maintain a heart healthy weight; if you smoke - quit (A resource to help you stop smoking is the [employer] – phone number 979-335-4959.); include healthy ways to manage stress. Continue to follow with your primary care physician or cardiologist.    Diagnosis: HLD (hyperlipidemia)  Assessment and Plan of Treatment: Continue with your cholesterol medications. Eat a heart healthy diet that is low in saturated fats and salt, and includes whole grains, fruits, vegetables and lean protein; exercise regularly (consult with your physician or cardiologist first); maintain a heart healthy weight; if you smoke - quit (A resource to help you stop smoking is the [facility] [facility] [employer] [facility] – phone number [phone].). Continue to follow with your primary physician or cardiologist.

## 2023-04-04 NOTE — DISCHARGE NOTE PROVIDER - NSDCCPTREATMENT_GEN_ALL_CORE_FT
PRINCIPAL PROCEDURE  Procedure: Placement of coronary artery stent  Findings and Treatment: shock wave and one stent to the distal RCA     PRINCIPAL PROCEDURE  Procedure: Placement of coronary artery stent  Findings and Treatment: 4/3- shock wave and one stent to the distal RCA  4/4- NAVEEN x1 to Cx via RRA

## 2023-04-04 NOTE — DISCHARGE NOTE PROVIDER - NSDCFUADDINST_GEN_ALL_CORE_FT
Wound Care: The day AFTER your procedure:  Remove bandage GENTLY, and clean using mild soap and gentle warm, water stream, pat dry.   Leave OPEN to air. YOU MAY SHOWER  DO NOT SOAK your procedure site for 1 week (no baths, no pools, no tubs)  Check your wrist or groin puncture site everyday.  A small amount of soreness and bruising is normal  ACTIVITY for the next 24 hours:  1) DO NOT DRIVE  2) DO NOT make any important decisions or sign legal documents  3) DO NOT operate heavy [employer]   You may resume sexual activity in 48 hours, unless otherwise instructed by your cardiologist  If your procedure was done through the WRIST, for the NEXT 3 DAYS:  AVOID pushing pulling  or repeated movement of that hand and wrist (eg: typing, hammering)  DO NOT LIFT anything more than 5 lbs     If your procedure was done through the GROIN, for the NEXT 5 DAYS:  Limit climbing the stairs, no strenuous activities, no pushing, no pulling, no straining  Do not lift anything that is 10lbs or heavier   MEDICATION:  Take your medications as explained (see discharge paperwork). If you recieved a stent, you will be taking medication to KEEP YOUR STENT OPEN. DO NOT STOP THESE MEDICATIONS UNLESS DIRECTED BY A CARDIOLOGIST  If you smoke, WE RECOMMEND YOU QUIT (you may call [phone] the [facility] if you need assistance)   FOLLOW UP: Please follow up with your private cardiologist (insert name) in 2 weeks.  Please call immediately for an appointment upon discharge from the hospital.    ***CALL YOUR DOCTOR***   If you experience: chest pain, fever, chills, body aches, or severe pain, swelling, redness, heat or yellow discharge at incision site or if you experience bleeding, temperature change, numbness or excruciating pain at the procedural site  If you are unable to reach your doctor, you may contact:    Cardiology Office at [facility] at [phone]   Cardiac Short Stay Unit (CSSU) [phone]    Cardiac Recovery Suite (CRS) [phone]

## 2023-04-04 NOTE — DISCHARGE NOTE PROVIDER - NSCORESITESY/N_GEN_A_CORE_RD
Spoke with pt to inform him that rx for tramadol was sent pharmacy pt states he allergic to tramadol an dis requesting percocet. States tramadol makes chest burn when he takes it.   No

## 2023-04-04 NOTE — DISCHARGE NOTE PROVIDER - HOSPITAL COURSE
HPI:  This sis a 82 year old Female Bahai with PMHx of HTN, Afib on Eliquis, Tachy-Bryan syndrome s/p PPM, T2DM, referred for coronary angiogram by her outpatient cardiologist, Dr. Wiley Pacheco. Per patient, she has been experiencing chest pressure, mostly when bending forward and intermitted dyspnea on exertion, she had a recent CCTA heart with total calcium score 948. She denies any chest pain, dyspnea, palpitations during exam.   (03 Apr 2023 08:25)    4/3 cardiac cath with shockwave and one stent to the distal RCA via right radial artery access.  4/4   HPI:  This sis a 82 year old Female Islam with PMHx of HTN, Afib on Eliquis, Tachy-Bryan syndrome s/p PPM, T2DM, referred for coronary angiogram by her outpatient cardiologist, Dr. Wiley Pacheco. Per patient, she has been experiencing chest pressure, mostly when bending forward and intermitted dyspnea on exertion, she had a recent CCTA heart with total calcium score 948. She denies any chest pain, dyspnea, palpitations during exam.   (03 Apr 2023 08:25)    4/3 cardiac cath with shockwave and one stent to the distal RCA via right radial artery access.  4/4- NAVEEN x1 to Cx via RRA, site without hematoma/bleeding. Patient without complaint, hemodynamically stable. Pending discharge later today   HPI:  This sis a 82 year old Female Bahai with PMHx of HTN, Afib on Eliquis, Tachy-Bryan syndrome s/p PPM, T2DM, referred for coronary angiogram by her outpatient cardiologist, Dr. Wiley Pacheco. Per patient, she has been experiencing chest pressure, mostly when bending forward and intermitted dyspnea on exertion, she had a recent CCTA heart with total calcium score 948. She denies any chest pain, dyspnea, palpitations during exam.   (03 Apr 2023 08:25)    4/3 cardiac cath with shockwave and one stent to the distal RCA via right radial artery access.  4/4- NAVEEN x1 to Cx via RRA, site without hematoma/bleeding. Patient without complaint, hemodynamically stable. Pending discharge later today

## 2023-04-04 NOTE — DISCHARGE NOTE NURSING/CASE MANAGEMENT/SOCIAL WORK - NSDCPEPT PROEDMA_GEN_ALL_CORE
Patient states directly to me that he is not suicidal and does not understand why he is still here. I reinforced that patient needs to sober up here in a safe controlled environment before being able to be discharged. Patient states concern for dog at home. I asked patient if he has anyone who can check on his dog and he states he does not.        Milvia Prescott RN  06/01/19 [id number] Yes

## 2023-04-04 NOTE — DISCHARGE NOTE PROVIDER - CARE PROVIDER_API CALL
Wiley Pacheco  CARDIOVASCULAR DISEASE  [address] [facility] [address]  [place] [address]  Phone: [phone]  Fax: [phone]  Follow Up Time: 2 weeks

## 2023-04-04 NOTE — PROGRESS NOTE ADULT - SUBJECTIVE AND OBJECTIVE BOX
SUBJECTIVE: Asymptomatic s/p staged pci by Dr Forrester, rca done yesterday, cx done this am.   	  MEDICATIONS:  amLODIPine   Tablet 2.5 milliGRAM(s) Oral daily  metoprolol succinate  milliGRAM(s) Oral daily  propafenone 225 milliGRAM(s) Oral every 12 hours  valsartan 320 milliGRAM(s) Oral daily  tiotropium 2.5 MICROgram(s) Inhaler 2 Puff(s) Inhalation daily  atorvastatin 40 milliGRAM(s) Oral at bedtime  dextrose 50% Injectable 25 Gram(s) IV Push once  dextrose 50% Injectable 12.5 Gram(s) IV Push once  dextrose 50% Injectable 25 Gram(s) IV Push once  dextrose Oral Gel 15 Gram(s) Oral once PRN  glucagon  Injectable 1 milliGRAM(s) IntraMuscular once  insulin lispro (ADMELOG) corrective regimen sliding scale   SubCutaneous three times a day before meals  insulin lispro (ADMELOG) corrective regimen sliding scale   SubCutaneous at bedtime  aspirin enteric coated 81 milliGRAM(s) Oral daily  clopidogrel Tablet 75 milliGRAM(s) Oral daily  dextrose 5%. 1000 milliLiter(s) IV Continuous <Continuous>  dextrose 5%. 1000 milliLiter(s) IV Continuous <Continuous>  sodium chloride 0.9% Bolus 250 milliLiter(s) IV Bolus once  sodium chloride 0.9%. 1000 milliLiter(s) IV Continuous <Continuous>  sodium chloride 0.9%. 1000 milliLiter(s) IV Continuous <Continuous>  sodium chloride 0.9%. 1000 milliLiter(s) IV Continuous <Continuous>      REVIEW OF SYSTEMS:    CONSTITUTIONAL: No fever, weight loss, or fatigue  EYES: No eye pain, visual disturbances, or discharge  NECK: No pain or stiffness  RESPIRATORY: No cough, wheezing, chills or hemoptysis; No Shortness of Breath  CARDIOVASCULAR: No chest pain, palpitations, dizziness, or leg swelling  GASTROINTESTINAL: No abdominal or epigastric pain. No nausea, vomiting, or hematemesis; No diarrhea or constipation. No melena or hematochezia.  GENITOURINARY: No dysuria, frequency, hematuria, or incontinence  NEUROLOGICAL: No headaches, memory loss, loss of strength, numbness, or tremors  SKIN: No itching, burning, rashes, or lesions   LYMPH Nodes: No enlarged glands  MUSCULOSKELETAL: No joint pain or swelling; No muscle, back, or extremity pain  All other review of systems are negative.  	      PHYSICAL EXAM:  T(C): 36.7 (04-04-23 @ 08:50), Max: 36.7 (04-04-23 @ 08:50)  HR: 60 (04-04-23 @ 09:50) (60 - 67)  BP: 151/68 (04-04-23 @ 09:50) (95/76 - 187/63)  RR: 17 (04-04-23 @ 09:50) (16 - 18)  SpO2: 96% (04-04-23 @ 09:50) (93% - 100%)  Wt(kg): --  I&O's Summary    04 Apr 2023 07:01  -  04 Apr 2023 10:15  --------------------------------------------------------  IN: 0 mL / OUT: 0 mL / NET: 0 mL      Height (cm): 167.6 (04-04 @ 02:58)  Weight (kg): 66.7 (04-04 @ 02:58)  BMI (kg/m2): 23.7 (04-04 @ 02:58)  BSA (m2): 1.75 (04-04 @ 02:58)    PHYSICAL EXAM    Appearance: Normal	  HEENT:   Normal oral mucosa, PERRL, EOMI	  NECK: Soft and supple, No LAD, No JVD  Cardiovascular: Regular Rate and Rhythm, Normal S1 S2, No murmurs, No clicks, gallops or rubs. Healed pm scar.   Respiratory: Lungs clear to auscultation	  Extremities: No clubbing, cyanosis or edema      LABS:	 	                            12.9   7.70  )-----------( 198      ( 03 Apr 2023 09:17 )             37.6     04-04    139  |  104  |  24<H>  ----------------------------<  116<H>  3.7   |  23  |  0.95    Ca    9.1      04 Apr 2023 01:05    TPro  7.1  /  Alb  4.6  /  TBili  0.9  /  DBili  x   /  AST  17  /  ALT  16  /  AlkPhos  83  04-03      
HPI:  This sis a 82 year old Female Congregational with PMHx of HTN, Afib on Eliquis, Tachy-Bryan syndrome s/p PPM, T2DM, referred for coronary angiogram by her outpatient cardiologist, Dr. Wiley Pacheco. Per patient, she has been experiencing chest pressure, mostly when bending forward and intermitted dyspnea on exertion, she had a recent CCTA heart with total calcium score 948. She denies any chest pain, dyspnea, palpitations during exam.   (03 Apr 2023 08:25)    Patient is a 82y old  Female who presents with a chief complaint of         Allergies    penicillin (Unknown)    Intolerances        Medications:  amLODIPine   Tablet 2.5 milliGRAM(s) Oral daily  aspirin enteric coated 81 milliGRAM(s) Oral daily  atorvastatin 40 milliGRAM(s) Oral at bedtime  clopidogrel Tablet 75 milliGRAM(s) Oral daily  dextrose 5%. 1000 milliLiter(s) IV Continuous <Continuous>  dextrose 5%. 1000 milliLiter(s) IV Continuous <Continuous>  dextrose 50% Injectable 25 Gram(s) IV Push once  dextrose 50% Injectable 12.5 Gram(s) IV Push once  dextrose 50% Injectable 25 Gram(s) IV Push once  dextrose Oral Gel 15 Gram(s) Oral once PRN  glucagon  Injectable 1 milliGRAM(s) IntraMuscular once  insulin lispro (ADMELOG) corrective regimen sliding scale   SubCutaneous three times a day before meals  insulin lispro (ADMELOG) corrective regimen sliding scale   SubCutaneous at bedtime  metoprolol succinate  milliGRAM(s) Oral daily  propafenone 225 milliGRAM(s) Oral every 12 hours  sodium chloride 0.9% Bolus 250 milliLiter(s) IV Bolus once  sodium chloride 0.9%. 1000 milliLiter(s) IV Continuous <Continuous>  sodium chloride 0.9%. 1000 milliLiter(s) IV Continuous <Continuous>  tiotropium 2.5 MICROgram(s) Inhaler 2 Puff(s) Inhalation daily  valsartan 320 milliGRAM(s) Oral daily      Vitals:  T(C): 36.6 (04-03-23 @ 15:10), Max: 36.8 (04-03-23 @ 08:25)  HR: 60 (04-03-23 @ 17:10) (58 - 67)  BP: 141/62 (04-03-23 @ 17:10) (95/76 - 187/63)  BP(mean): 93 (04-03-23 @ 16:10) (77 - 109)  RR: 17 (04-03-23 @ 17:10) (16 - 20)  SpO2: 97% (04-03-23 @ 17:10) (58% - 100%)  Wt(kg): --  Daily Height in cm: 167.64 (03 Apr 2023 08:25)    Daily   I&O's Summary        Physical Exam:  Appearance: Normal  Eyes: PERRL, EOMI  HENT: Normal oral muscosa, NC/AT  Cardiovascular: S1S2, RRR, No M/R/G, no JVD, No Lower extremity edema  Procedural Access Site: No hematoma, Non-tender to palpation, 2+ pulse, No bruit, No Ecchymosis  Respiratory: Clear to auscultation bilaterally  Gastrointestinal: Soft, Non tender, Normal Bowel Sounds  Musculoskeletal: No clubbing, No joint deformity   Neurologic: Non-focal  Lymphatic: No lymphadenopathy  Psychiatry: AAOx3, Mood & affect appropriate  Skin: No rashes, No ecchymoses, No cyanosis    04-03    137  |  101  |  23  ----------------------------<  148<H>  4.2   |  23  |  1.05    Ca    10.0      03 Apr 2023 09:17    TPro  7.1  /  Alb  4.6  /  TBili  0.9  /  DBili  x   /  AST  17  /  ALT  16  /  AlkPhos  83  04-03            Lipid panel   Hgb A1c                         12.9   7.70  )-----------( 198      ( 03 Apr 2023 09:17 )             37.6         ECG: A-paced 60 bpm

## 2023-04-12 ENCOUNTER — APPOINTMENT (OUTPATIENT)
Dept: CARDIOLOGY | Facility: CLINIC | Age: 83
End: 2023-04-12

## 2023-04-13 NOTE — PATIENT PROFILE ADULT - FUNCTIONAL ASSESSMENT - DAILY ACTIVITY 5.
On initial assessment of patient, she stated she got up to use the restroom, after she urinated she became dizzy and light headed for about 30min. She contacted her PCP and was advised to come to the Er. Pt is Aox4, follows commands and moves all extremities with out difficulty. Pt reports only medical history is hypertension. Pt also complains of Left sided headaches intermittently for the past month.   4 = No assist / stand by assistance

## 2023-04-19 ENCOUNTER — APPOINTMENT (OUTPATIENT)
Dept: CARDIOLOGY | Facility: CLINIC | Age: 83
End: 2023-04-19
Payer: MEDICARE

## 2023-04-19 VITALS
HEIGHT: 66 IN | WEIGHT: 147 LBS | BODY MASS INDEX: 23.63 KG/M2 | OXYGEN SATURATION: 99 % | HEART RATE: 59 BPM | SYSTOLIC BLOOD PRESSURE: 180 MMHG | DIASTOLIC BLOOD PRESSURE: 78 MMHG

## 2023-04-19 DIAGNOSIS — M79.631 PAIN IN RIGHT FOREARM: ICD-10-CM

## 2023-04-19 PROCEDURE — 99213 OFFICE O/P EST LOW 20 MIN: CPT

## 2023-04-19 PROCEDURE — 93000 ELECTROCARDIOGRAM COMPLETE: CPT

## 2023-04-20 PROCEDURE — C9600: CPT | Mod: LC

## 2023-04-20 PROCEDURE — 36415 COLL VENOUS BLD VENIPUNCTURE: CPT

## 2023-04-20 PROCEDURE — C1894: CPT

## 2023-04-20 PROCEDURE — 92972 PERQ TRLUML CORONRY LITHOTRP: CPT

## 2023-04-20 PROCEDURE — 85027 COMPLETE CBC AUTOMATED: CPT

## 2023-04-20 PROCEDURE — C1769: CPT

## 2023-04-20 PROCEDURE — 92978 ENDOLUMINL IVUS OCT C 1ST: CPT | Mod: RC

## 2023-04-20 PROCEDURE — 94640 AIRWAY INHALATION TREATMENT: CPT

## 2023-04-20 PROCEDURE — 93454 CORONARY ARTERY ANGIO S&I: CPT

## 2023-04-20 PROCEDURE — 0523T NTRAPX C FFR W/3D FUNCJL MAP: CPT

## 2023-04-20 PROCEDURE — 80048 BASIC METABOLIC PNL TOTAL CA: CPT

## 2023-04-20 PROCEDURE — C1725: CPT

## 2023-04-20 PROCEDURE — 93005 ELECTROCARDIOGRAM TRACING: CPT

## 2023-04-20 PROCEDURE — 82962 GLUCOSE BLOOD TEST: CPT

## 2023-04-20 PROCEDURE — C1753: CPT

## 2023-04-20 PROCEDURE — 93458 L HRT ARTERY/VENTRICLE ANGIO: CPT

## 2023-04-20 PROCEDURE — C1874: CPT

## 2023-04-20 PROCEDURE — 93799 UNLISTED CV SVC/PROCEDURE: CPT | Mod: LD

## 2023-04-20 PROCEDURE — C1887: CPT

## 2023-04-20 PROCEDURE — 80053 COMPREHEN METABOLIC PANEL: CPT

## 2023-04-20 PROCEDURE — 92928 PRQ TCAT PLMT NTRAC ST 1 LES: CPT

## 2023-04-20 PROCEDURE — C1761: CPT

## 2023-04-20 PROCEDURE — 0715T: CPT

## 2023-04-23 ENCOUNTER — NON-APPOINTMENT (OUTPATIENT)
Age: 83
End: 2023-04-23

## 2023-04-23 PROBLEM — M79.631 PAIN OF RIGHT FOREARM: Status: ACTIVE | Noted: 2023-04-23

## 2023-04-23 NOTE — PHYSICAL EXAM
[Well Developed] : well developed [Well Nourished] : well nourished [Normal Conjunctiva] : normal conjunctiva [No Acute Distress] : no acute distress [Normal Venous Pressure] : normal venous pressure [No Carotid Bruit] : no carotid bruit [Normal S1, S2] : normal S1, S2 [No Murmur] : no murmur [No Rub] : no rub [No Gallop] : no gallop [Clear Lung Fields] : clear lung fields [Good Air Entry] : good air entry [No Respiratory Distress] : no respiratory distress  [Soft] : abdomen soft [Non Tender] : non-tender [No Masses/organomegaly] : no masses/organomegaly [Normal Bowel Sounds] : normal bowel sounds [Normal Gait] : normal gait [No Edema] : no edema [No Cyanosis] : no cyanosis [No Clubbing] : no clubbing [No Varicosities] : no varicosities [No Skin Lesions] : no skin lesions [No Rash] : no rash [Moves all extremities] : moves all extremities [No Focal Deficits] : no focal deficits [Normal Speech] : normal speech [Alert and Oriented] : alert and oriented [Normal memory] : normal memory [de-identified] : Radial pulse +2 (doppler used as well). Forearm eccymosis and tenderness

## 2023-04-23 NOTE — CARDIOLOGY SUMMARY
[de-identified] : 4/19/23\par Electronic atrial pacemaker \par Voltage criteria for LVH  (R(I)+S(III) exceeds 2.50 mV). \par \par ABNORMAL RHYTHM\par

## 2023-04-23 NOTE — REVIEW OF SYSTEMS
[Feeling Fatigued] : feeling fatigued [Joint Pain] : joint pain [Skin Lesions] : skin lesion(s): [Negative] : Heme/Lymph

## 2023-04-23 NOTE — HISTORY OF PRESENT ILLNESS
[FreeTextEntry1] : Recent cath - returns due to forearm pain (saw her Cardio today)\par C/o forearm pain and tenderness proximal to radial arteriotomy\par No swelling\par No limitation to finger movement\par No sensation of cold or weakness of hand\par

## 2023-04-23 NOTE — DISCUSSION/SUMMARY
[FreeTextEntry1] : s/p cath with forearm pain\par Likely related to small resolved hematoma\par \par REcc warm soaks, pain meds (tylenol, NSAIDS)\par Advised her to call back in 10-14 days if no improvement [EKG obtained to assist in diagnosis and management of assessed problem(s)] : EKG obtained to assist in diagnosis and management of assessed problem(s)

## 2023-05-09 ENCOUNTER — INPATIENT (INPATIENT)
Facility: HOSPITAL | Age: 83
LOS: 2 days | Discharge: HOME | DRG: 310 | End: 2023-05-12
Attending: INTERNAL MEDICINE | Admitting: INTERNAL MEDICINE
Payer: MEDICARE

## 2023-05-09 VITALS
DIASTOLIC BLOOD PRESSURE: 83 MMHG | RESPIRATION RATE: 18 BRPM | OXYGEN SATURATION: 99 % | HEART RATE: 99 BPM | WEIGHT: 147.05 LBS | SYSTOLIC BLOOD PRESSURE: 133 MMHG | HEIGHT: 66 IN | TEMPERATURE: 98 F

## 2023-05-09 DIAGNOSIS — I48.91 UNSPECIFIED ATRIAL FIBRILLATION: ICD-10-CM

## 2023-05-09 DIAGNOSIS — J43.9 EMPHYSEMA, UNSPECIFIED: ICD-10-CM

## 2023-05-09 DIAGNOSIS — I25.10 ATHEROSCLEROTIC HEART DISEASE OF NATIVE CORONARY ARTERY WITHOUT ANGINA PECTORIS: ICD-10-CM

## 2023-05-09 DIAGNOSIS — R06.02 SHORTNESS OF BREATH: ICD-10-CM

## 2023-05-09 DIAGNOSIS — R42 DIZZINESS AND GIDDINESS: ICD-10-CM

## 2023-05-09 DIAGNOSIS — E11.9 TYPE 2 DIABETES MELLITUS WITHOUT COMPLICATIONS: ICD-10-CM

## 2023-05-09 DIAGNOSIS — I10 ESSENTIAL (PRIMARY) HYPERTENSION: ICD-10-CM

## 2023-05-09 LAB
ALBUMIN SERPL ELPH-MCNC: 4.4 G/DL — SIGNIFICANT CHANGE UP (ref 3.3–5)
ALP SERPL-CCNC: 89 U/L — SIGNIFICANT CHANGE UP (ref 40–120)
ALT FLD-CCNC: 23 U/L — SIGNIFICANT CHANGE UP (ref 10–45)
ANION GAP SERPL CALC-SCNC: 16 MMOL/L — SIGNIFICANT CHANGE UP (ref 5–17)
AST SERPL-CCNC: 32 U/L — SIGNIFICANT CHANGE UP (ref 10–40)
BASE EXCESS BLDV CALC-SCNC: -5.2 MMOL/L — LOW (ref -2–3)
BASOPHILS # BLD AUTO: 0 K/UL — SIGNIFICANT CHANGE UP (ref 0–0.2)
BASOPHILS NFR BLD AUTO: 0 % — SIGNIFICANT CHANGE UP (ref 0–2)
BILIRUB SERPL-MCNC: 0.8 MG/DL — SIGNIFICANT CHANGE UP (ref 0.2–1.2)
BUN SERPL-MCNC: 23 MG/DL — SIGNIFICANT CHANGE UP (ref 7–23)
CA-I SERPL-SCNC: 1.18 MMOL/L — SIGNIFICANT CHANGE UP (ref 1.15–1.33)
CALCIUM SERPL-MCNC: 9.4 MG/DL — SIGNIFICANT CHANGE UP (ref 8.4–10.5)
CHLORIDE BLDV-SCNC: 99 MMOL/L — SIGNIFICANT CHANGE UP (ref 96–108)
CHLORIDE SERPL-SCNC: 100 MMOL/L — SIGNIFICANT CHANGE UP (ref 96–108)
CO2 BLDV-SCNC: 22 MMOL/L — SIGNIFICANT CHANGE UP (ref 22–26)
CO2 SERPL-SCNC: 17 MMOL/L — LOW (ref 22–31)
CREAT SERPL-MCNC: 1.12 MG/DL — SIGNIFICANT CHANGE UP (ref 0.5–1.3)
EGFR: 49 ML/MIN/1.73M2 — LOW
ELLIPTOCYTES BLD QL SMEAR: SLIGHT — SIGNIFICANT CHANGE UP
EOSINOPHIL # BLD AUTO: 0.13 K/UL — SIGNIFICANT CHANGE UP (ref 0–0.5)
EOSINOPHIL NFR BLD AUTO: 1.7 % — SIGNIFICANT CHANGE UP (ref 0–6)
GAS PNL BLDV: 130 MMOL/L — LOW (ref 136–145)
GAS PNL BLDV: SIGNIFICANT CHANGE UP
GAS PNL BLDV: SIGNIFICANT CHANGE UP
GLUCOSE BLDC GLUCOMTR-MCNC: 161 MG/DL — HIGH (ref 70–99)
GLUCOSE BLDV-MCNC: 110 MG/DL — HIGH (ref 70–99)
GLUCOSE SERPL-MCNC: 128 MG/DL — HIGH (ref 70–99)
HCO3 BLDV-SCNC: 21 MMOL/L — LOW (ref 22–29)
HCT VFR BLD CALC: 39.2 % — SIGNIFICANT CHANGE UP (ref 34.5–45)
HCT VFR BLDA CALC: 37 % — SIGNIFICANT CHANGE UP (ref 34.5–46.5)
HGB BLD CALC-MCNC: 12.4 G/DL — SIGNIFICANT CHANGE UP (ref 11.7–16.1)
HGB BLD-MCNC: 13 G/DL — SIGNIFICANT CHANGE UP (ref 11.5–15.5)
LACTATE BLDV-MCNC: 2.8 MMOL/L — HIGH (ref 0.5–2)
LYMPHOCYTES # BLD AUTO: 1.65 K/UL — SIGNIFICANT CHANGE UP (ref 1–3.3)
LYMPHOCYTES # BLD AUTO: 20.9 % — SIGNIFICANT CHANGE UP (ref 13–44)
MAGNESIUM SERPL-MCNC: 1.6 MG/DL — SIGNIFICANT CHANGE UP (ref 1.6–2.6)
MANUAL SMEAR VERIFICATION: SIGNIFICANT CHANGE UP
MCHC RBC-ENTMCNC: 29.3 PG — SIGNIFICANT CHANGE UP (ref 27–34)
MCHC RBC-ENTMCNC: 33.2 GM/DL — SIGNIFICANT CHANGE UP (ref 32–36)
MCV RBC AUTO: 88.5 FL — SIGNIFICANT CHANGE UP (ref 80–100)
MONOCYTES # BLD AUTO: 0.89 K/UL — SIGNIFICANT CHANGE UP (ref 0–0.9)
MONOCYTES NFR BLD AUTO: 11.3 % — SIGNIFICANT CHANGE UP (ref 2–14)
NEUTROPHILS # BLD AUTO: 5.23 K/UL — SIGNIFICANT CHANGE UP (ref 1.8–7.4)
NEUTROPHILS NFR BLD AUTO: 66.1 % — SIGNIFICANT CHANGE UP (ref 43–77)
NT-PROBNP SERPL-SCNC: 2540 PG/ML — HIGH (ref 0–300)
PCO2 BLDV: 40 MMHG — SIGNIFICANT CHANGE UP (ref 39–42)
PH BLDV: 7.32 — SIGNIFICANT CHANGE UP (ref 7.32–7.43)
PLAT MORPH BLD: NORMAL — SIGNIFICANT CHANGE UP
PLATELET # BLD AUTO: 231 K/UL — SIGNIFICANT CHANGE UP (ref 150–400)
PO2 BLDV: 34 MMHG — SIGNIFICANT CHANGE UP (ref 25–45)
POTASSIUM BLDV-SCNC: 4.2 MMOL/L — SIGNIFICANT CHANGE UP (ref 3.5–5.1)
POTASSIUM SERPL-MCNC: 4.6 MMOL/L — SIGNIFICANT CHANGE UP (ref 3.5–5.3)
POTASSIUM SERPL-SCNC: 4.6 MMOL/L — SIGNIFICANT CHANGE UP (ref 3.5–5.3)
PROT SERPL-MCNC: 6.9 G/DL — SIGNIFICANT CHANGE UP (ref 6–8.3)
RAPID RVP RESULT: SIGNIFICANT CHANGE UP
RBC # BLD: 4.43 M/UL — SIGNIFICANT CHANGE UP (ref 3.8–5.2)
RBC # FLD: 13.1 % — SIGNIFICANT CHANGE UP (ref 10.3–14.5)
RBC BLD AUTO: SIGNIFICANT CHANGE UP
SAO2 % BLDV: 53.6 % — LOW (ref 67–88)
SARS-COV-2 RNA SPEC QL NAA+PROBE: SIGNIFICANT CHANGE UP
SODIUM SERPL-SCNC: 133 MMOL/L — LOW (ref 135–145)
TROPONIN T, HIGH SENSITIVITY RESULT: 12 NG/L — SIGNIFICANT CHANGE UP (ref 0–51)
WBC # BLD: 7.91 K/UL — SIGNIFICANT CHANGE UP (ref 3.8–10.5)
WBC # FLD AUTO: 7.91 K/UL — SIGNIFICANT CHANGE UP (ref 3.8–10.5)

## 2023-05-09 PROCEDURE — 99223 1ST HOSP IP/OBS HIGH 75: CPT

## 2023-05-09 PROCEDURE — 99285 EMERGENCY DEPT VISIT HI MDM: CPT | Mod: CS

## 2023-05-09 PROCEDURE — 71045 X-RAY EXAM CHEST 1 VIEW: CPT | Mod: 26

## 2023-05-09 RX ORDER — ATORVASTATIN CALCIUM 80 MG/1
40 TABLET, FILM COATED ORAL AT BEDTIME
Refills: 0 | Status: DISCONTINUED | OUTPATIENT
Start: 2023-05-09 | End: 2023-05-12

## 2023-05-09 RX ORDER — METOPROLOL TARTRATE 50 MG
100 TABLET ORAL DAILY
Refills: 0 | Status: DISCONTINUED | OUTPATIENT
Start: 2023-05-09 | End: 2023-05-10

## 2023-05-09 RX ORDER — ACETAMINOPHEN 500 MG
650 TABLET ORAL EVERY 6 HOURS
Refills: 0 | Status: DISCONTINUED | OUTPATIENT
Start: 2023-05-09 | End: 2023-05-12

## 2023-05-09 RX ORDER — APIXABAN 2.5 MG/1
5 TABLET, FILM COATED ORAL EVERY 12 HOURS
Refills: 0 | Status: DISCONTINUED | OUTPATIENT
Start: 2023-05-09 | End: 2023-05-12

## 2023-05-09 RX ORDER — MAGNESIUM SULFATE 500 MG/ML
1 VIAL (ML) INJECTION ONCE
Refills: 0 | Status: COMPLETED | OUTPATIENT
Start: 2023-05-09 | End: 2023-05-09

## 2023-05-09 RX ORDER — DEXTROSE 50 % IN WATER 50 %
25 SYRINGE (ML) INTRAVENOUS ONCE
Refills: 0 | Status: DISCONTINUED | OUTPATIENT
Start: 2023-05-09 | End: 2023-05-12

## 2023-05-09 RX ORDER — DEXTROSE 50 % IN WATER 50 %
12.5 SYRINGE (ML) INTRAVENOUS ONCE
Refills: 0 | Status: DISCONTINUED | OUTPATIENT
Start: 2023-05-09 | End: 2023-05-12

## 2023-05-09 RX ORDER — DEXTROSE 50 % IN WATER 50 %
15 SYRINGE (ML) INTRAVENOUS ONCE
Refills: 0 | Status: DISCONTINUED | OUTPATIENT
Start: 2023-05-09 | End: 2023-05-12

## 2023-05-09 RX ORDER — SODIUM CHLORIDE 9 MG/ML
1000 INJECTION INTRAMUSCULAR; INTRAVENOUS; SUBCUTANEOUS ONCE
Refills: 0 | Status: COMPLETED | OUTPATIENT
Start: 2023-05-09 | End: 2023-05-09

## 2023-05-09 RX ORDER — SODIUM CHLORIDE 9 MG/ML
1000 INJECTION, SOLUTION INTRAVENOUS
Refills: 0 | Status: DISCONTINUED | OUTPATIENT
Start: 2023-05-09 | End: 2023-05-12

## 2023-05-09 RX ORDER — INSULIN LISPRO 100/ML
VIAL (ML) SUBCUTANEOUS AT BEDTIME
Refills: 0 | Status: DISCONTINUED | OUTPATIENT
Start: 2023-05-09 | End: 2023-05-12

## 2023-05-09 RX ORDER — PROPAFENONE HCL 150 MG
225 TABLET ORAL EVERY 12 HOURS
Refills: 0 | Status: DISCONTINUED | OUTPATIENT
Start: 2023-05-09 | End: 2023-05-10

## 2023-05-09 RX ORDER — LANOLIN ALCOHOL/MO/W.PET/CERES
3 CREAM (GRAM) TOPICAL AT BEDTIME
Refills: 0 | Status: DISCONTINUED | OUTPATIENT
Start: 2023-05-09 | End: 2023-05-12

## 2023-05-09 RX ORDER — AMLODIPINE BESYLATE 2.5 MG/1
2.5 TABLET ORAL DAILY
Refills: 0 | Status: DISCONTINUED | OUTPATIENT
Start: 2023-05-09 | End: 2023-05-12

## 2023-05-09 RX ORDER — INSULIN LISPRO 100/ML
VIAL (ML) SUBCUTANEOUS
Refills: 0 | Status: DISCONTINUED | OUTPATIENT
Start: 2023-05-09 | End: 2023-05-12

## 2023-05-09 RX ORDER — BUDESONIDE AND FORMOTEROL FUMARATE DIHYDRATE 160; 4.5 UG/1; UG/1
2 AEROSOL RESPIRATORY (INHALATION)
Refills: 0 | Status: DISCONTINUED | OUTPATIENT
Start: 2023-05-09 | End: 2023-05-12

## 2023-05-09 RX ORDER — CLOPIDOGREL BISULFATE 75 MG/1
75 TABLET, FILM COATED ORAL DAILY
Refills: 0 | Status: DISCONTINUED | OUTPATIENT
Start: 2023-05-10 | End: 2023-05-12

## 2023-05-09 RX ORDER — ONDANSETRON 8 MG/1
4 TABLET, FILM COATED ORAL EVERY 8 HOURS
Refills: 0 | Status: DISCONTINUED | OUTPATIENT
Start: 2023-05-09 | End: 2023-05-12

## 2023-05-09 RX ORDER — GLUCAGON INJECTION, SOLUTION 0.5 MG/.1ML
1 INJECTION, SOLUTION SUBCUTANEOUS ONCE
Refills: 0 | Status: DISCONTINUED | OUTPATIENT
Start: 2023-05-09 | End: 2023-05-12

## 2023-05-09 RX ORDER — VALSARTAN 80 MG/1
320 TABLET ORAL DAILY
Refills: 0 | Status: DISCONTINUED | OUTPATIENT
Start: 2023-05-09 | End: 2023-05-12

## 2023-05-09 RX ORDER — PROPAFENONE HCL 150 MG
225 TABLET ORAL EVERY 12 HOURS
Refills: 0 | Status: DISCONTINUED | OUTPATIENT
Start: 2023-05-09 | End: 2023-05-09

## 2023-05-09 RX ADMIN — SODIUM CHLORIDE 1000 MILLILITER(S): 9 INJECTION INTRAMUSCULAR; INTRAVENOUS; SUBCUTANEOUS at 14:40

## 2023-05-09 RX ADMIN — APIXABAN 5 MILLIGRAM(S): 2.5 TABLET, FILM COATED ORAL at 23:03

## 2023-05-09 RX ADMIN — Medication 100 GRAM(S): at 23:02

## 2023-05-09 NOTE — H&P ADULT - HISTORY OF PRESENT ILLNESS
Patient is an 82 year old Female Hinduism with PMHx of HTN, Afib on Eliquis, Tachy-Bryan syndrome s/p PPM, T2DM, CAD s/p recent NAVEEN to Cx, presents for lightheadedness for the past  Patient is an 82 year old Female [place] with PMHx of HTN, Afib on Eliquis, Tachy-Bryan syndrome s/p PPM, T2DM, CAD s/p recent NAVEEN to Cx, presents for lightheadedness for the past two days. Patient reports feeling as if she is going to pass out. She also reports generalized weakness in her legs. She reports no fall or trauma. She reports intermittent palpitations , no chest pain or shortness of breath , she has ongoing dyspnea with exertion unchanged , no lower extremity edema. She reports no fever  or chills. She has several loose bowel movements on day prior to admission which has since resolved.

## 2023-05-09 NOTE — ED PROVIDER NOTE - PROGRESS NOTE DETAILS
Duy, PGY3: discussed case w/ Dr. Silva, cardiologist covering for Dr. Wiley Pacheco. Patient has had no history of afib (although listed as medical problem) and was noted to be in afib w/ a rate of 101 and had a systolic pressure in the 90s while in the office. In the ED, patient is in afib in the 90s w/ pressures in the 130s. Recommended admission for EP eval. Discussed case w/ Dr. Ayala who accepts patient.

## 2023-05-09 NOTE — ED PROVIDER NOTE - NS ED ROS FT
Gen: No fever, normal appetite  Eyes: No eye irritation or discharge  ENT: No ear pain, congestion, sore throat  Resp: No cough (+) trouble breathing  Cardiovascular: No chest pain or palpitation  Gastroenteric: No nausea/vomiting, diarrhea, constipation  :  No change in urine output; no dysuria  MS: No joint or muscle pain  Skin: No rashes  Neuro: No headache; no abnormal movements; (+) weakness  Remainder negative, except as per the HPI

## 2023-05-09 NOTE — H&P ADULT - NSHPLABSRESULTS_GEN_ALL_CORE
Labs personally reviewed:                          13.0   7.91  )-----------( 231      ( 09 May 2023 14:51 )             39.2     05-09    133<L>  |  100  |  23  ----------------------------<  128<H>  4.6   |  17<L>  |  1.12    Ca    9.4      09 May 2023 14:51  Mg     1.6     05-09    TPro  6.9  /  Alb  4.4  /  TBili  0.8  /  DBili  x   /  AST  32  /  ALT  23  /  AlkPhos  89  05-09        LIVER FUNCTIONS - ( 09 May 2023 14:51 )  Alb: 4.4 g/dL / Pro: 6.9 g/dL / ALK PHOS: 89 U/L / ALT: 23 U/L / AST: 32 U/L / GGT: x               CAPILLARY BLOOD GLUCOSE      POCT Blood Glucose.: 161 mg/dL (09 May 2023 21:38)      Imaging:  CXR personally reviewed: no focal opacity    EKG personally reviewed:afib at 92 bpm ,   Tele afib in 90's

## 2023-05-09 NOTE — ED ADULT NURSE NOTE - BEFAST SCREENING
Patient called in requesting this medication as he has used this before frequently for his chronic sinus conditions. He also wanted to let Dr. Gwendolyn Millard know that he is going to get his blood drawn Friday morning.     Please Advise
Negative

## 2023-05-09 NOTE — ED ADULT NURSE NOTE - OBJECTIVE STATEMENT
Patient is a 82 year old female complaining of dizziness. Patient reports light handedness and bilateral leg weakness that started this morning. On assessment A&Ox4, ambulates steady, neuro intact, moving all extremities well, clear speech, breathing comfortably on room air, no accessory muscle use, a-fib on the cardiac monitor, no edema, abdomen soft, skin warm and dry. Patient denies chest pain, palpitations, cough, abdominal pain, n/v, urinary symptoms, fevers, chills at this time. PMH HTN, Afib on Eliquis, Tachy-Bryan syndrome s/p PPM, T2DM.

## 2023-05-09 NOTE — H&P ADULT - ASSESSMENT
82 year old Female Presy[place] with PMHx of HTN, Afib on Eliquis, Tachy-Bryan syndrome s/p PPM, T2DM, CAD s/p recent NAVEEN x2 , p/w lightheadedness x 2 days

## 2023-05-09 NOTE — ED ADULT NURSE REASSESSMENT NOTE - NS ED NURSE REASSESS COMMENT FT1
inpatient team contact at [id number] for off tele, states they would like to know HR prior to off tele order, informed that pt has been A fib/NSR 90 BPM, states she will put in the off tele order, pending floor bed

## 2023-05-09 NOTE — H&P ADULT - NSHPREVIEWOFSYSTEMS_GEN_ALL_CORE
CONSTITUTIONAL: + weakness, no fevers or chills  EYES/ENT: No visual changes;  No dysphagia  NECK: No pain or stiffness  RESPIRATORY: No cough, wheezing, hemoptysis; No shortness of breath  CARDIOVASCULAR: No chest pain + palpitations; No lower extremity edema  EXTREMITIES: no le edema, cyanosis, clubbing  GASTROINTESTINAL: No abdominal or epigastric pain. No nausea, vomiting, or hematemesis; No diarrhea or constipation. No melena or hematochezia.  BACK: No back pain  GENITOURINARY: No dysuria, frequency or hematuria  NEUROLOGICAL: No numbness or weakness  MSK: no joint swelling or pain  SKIN: No itching, burning, rashes, or lesions   PSYCH: no agitation  All other review of systems is negative unless indicated above.

## 2023-05-09 NOTE — PATIENT PROFILE ADULT - FALL HARM RISK - UNIVERSAL INTERVENTIONS
Bed in lowest position, wheels locked, appropriate side rails in place/Call bell, personal items and telephone in reach/Instruct patient to call for assistance before getting out of bed or chair/Non-slip footwear when patient is out of bed/[place] to call system/Physically safe environment - no spills, clutter or unnecessary equipment/Purposeful Proactive Rounding/Room/bathroom lighting operational, light cord in reach

## 2023-05-09 NOTE — H&P ADULT - NSHPPHYSICALEXAM_GEN_ALL_CORE
Vital Signs Last 24 Hrs  T(C): 36.4 (09 May 2023 21:26), Max: 36.8 (09 May 2023 18:49)  T(F): 97.6 (09 May 2023 21:26), Max: 98.2 (09 May 2023 18:49)  HR: 88 (09 May 2023 21:26) (88 - 101)  BP: 138/74 (09 May 2023 21:26) (105/70 - 138/74)  BP(mean): --  RR: 16 (09 May 2023 21:26) (16 - 18)  SpO2: 98% (09 May 2023 21:26) (98% - 99%)    Parameters below as of 09 May 2023 21:26  Patient On (Oxygen Delivery Method): room air Vital Signs Last 24 Hrs  T(C): 36.4 (09 May 2023 21:26), Max: 36.8 (09 May 2023 18:49)  T(F): 97.6 (09 May 2023 21:26), Max: 98.2 (09 May 2023 18:49)  HR: 88 (09 May 2023 21:26) (88 - 101)  BP: 138/74 (09 May 2023 21:26) (105/70 - 138/74)  BP(mean): --  RR: 16 (09 May 2023 21:26) (16 - 18)  SpO2: 98% (09 May 2023 21:26) (98% - 99%)    Parameters below as of 09 May 2023 21:26  Patient On (Oxygen Delivery Method): room air    GENERAL: No acute distress, well-developed  HEAD:  Atraumatic, Normocephalic  ENT: EOMI, PERRLA, conjunctiva and sclera clear,  moist mucosa no pharyngeal erythema or exudates   NECK: supple , no JVD   CHEST/LUNG: Clear to auscultation bilaterally; No wheeze, equal breath sounds bilaterally   BACK: No spinal tenderness,  No CVA tenderness   HEART: Regular rate and rhythm; No murmurs, rubs, or gallops  ABDOMEN: Soft, Nontender, Nondistended; Bowel sounds present  EXTREMITIES:  No clubbing, cyanosis, or edema  MSK: No joint swelling or effusions, ROM intact   PSYCH: Normal behavior/affect  NEUROLOGY: AAOx3, non-focal, cranial nerves intact  SKIN: Normal color, No rashes or lesions

## 2023-05-09 NOTE — H&P ADULT - PROBLEM SELECTOR PLAN 1
no evidence of coronary ischemia , may be 2/2 to afib   - orthostatic Vs   - TTE   - tele  - repeat troponin

## 2023-05-09 NOTE — ED PROVIDER NOTE - ATTENDING CONTRIBUTION TO CARE
82 year old Female [place] with PMHx of HTN, Afib on Eliquis, Tachy-Bryan syndrome s/p PPM, T2DM p/w light headedness, Weakness to her legs bilateral nonfocal neuro exam denies any shortness of breath but states she has been feeling palpitations due to her A-fib was told to come in by her cardiologist Dr. Pacheco.  Patient noted to have a recent cath with 1 stent placements and shockwave treatment.  Patient hr with 90 with chest pain or shortness of breath cardiac work-up was initiated she discussed with her cardiologist further plan rate controlled.

## 2023-05-09 NOTE — H&P ADULT - PROBLEM SELECTOR PLAN 6
- Hold oral hypoglycemics and victoza while inpatient   - insulin correction scale  - check fsg qac/qhs  - check a1c in am  - consistent carb diet

## 2023-05-09 NOTE — PATIENT PROFILE ADULT - FUNCTIONAL ASSESSMENT - BASIC MOBILITY 6.
4-calculated by average/Not able to assess (calculate score using [facility] averaging method)

## 2023-05-09 NOTE — H&P ADULT - PROBLEM SELECTOR PLAN 5
on Anoro- ellipta , non formulary , can use own meds if available , otherwise will use Symbicort w/ PRN Duoneb

## 2023-05-10 LAB
A1C WITH ESTIMATED AVERAGE GLUCOSE RESULT: 6.9 % — HIGH (ref 4–5.6)
ALBUMIN SERPL ELPH-MCNC: 3.6 G/DL — SIGNIFICANT CHANGE UP (ref 3.3–5)
ALP SERPL-CCNC: 76 U/L — SIGNIFICANT CHANGE UP (ref 40–120)
ALT FLD-CCNC: 17 U/L — SIGNIFICANT CHANGE UP (ref 10–45)
ANION GAP SERPL CALC-SCNC: 11 MMOL/L — SIGNIFICANT CHANGE UP (ref 5–17)
AST SERPL-CCNC: 15 U/L — SIGNIFICANT CHANGE UP (ref 10–40)
BILIRUB SERPL-MCNC: 0.5 MG/DL — SIGNIFICANT CHANGE UP (ref 0.2–1.2)
BUN SERPL-MCNC: 24 MG/DL — HIGH (ref 7–23)
CALCIUM SERPL-MCNC: 8.9 MG/DL — SIGNIFICANT CHANGE UP (ref 8.4–10.5)
CHLORIDE SERPL-SCNC: 108 MMOL/L — SIGNIFICANT CHANGE UP (ref 96–108)
CO2 SERPL-SCNC: 20 MMOL/L — LOW (ref 22–31)
CREAT SERPL-MCNC: 0.99 MG/DL — SIGNIFICANT CHANGE UP (ref 0.5–1.3)
EGFR: 57 ML/MIN/1.73M2 — LOW
ESTIMATED AVERAGE GLUCOSE: 151 MG/DL — HIGH (ref 68–114)
GLUCOSE BLDC GLUCOMTR-MCNC: 114 MG/DL — HIGH (ref 70–99)
GLUCOSE BLDC GLUCOMTR-MCNC: 142 MG/DL — HIGH (ref 70–99)
GLUCOSE BLDC GLUCOMTR-MCNC: 151 MG/DL — HIGH (ref 70–99)
GLUCOSE BLDC GLUCOMTR-MCNC: 223 MG/DL — HIGH (ref 70–99)
GLUCOSE SERPL-MCNC: 154 MG/DL — HIGH (ref 70–99)
HCT VFR BLD CALC: 33.4 % — LOW (ref 34.5–45)
HGB BLD-MCNC: 11.7 G/DL — SIGNIFICANT CHANGE UP (ref 11.5–15.5)
MAGNESIUM SERPL-MCNC: 1.8 MG/DL — SIGNIFICANT CHANGE UP (ref 1.6–2.6)
MCHC RBC-ENTMCNC: 30.1 PG — SIGNIFICANT CHANGE UP (ref 27–34)
MCHC RBC-ENTMCNC: 35 GM/DL — SIGNIFICANT CHANGE UP (ref 32–36)
MCV RBC AUTO: 85.9 FL — SIGNIFICANT CHANGE UP (ref 80–100)
NRBC # BLD: 0 /100 WBCS — SIGNIFICANT CHANGE UP (ref 0–0)
PLATELET # BLD AUTO: 186 K/UL — SIGNIFICANT CHANGE UP (ref 150–400)
POTASSIUM SERPL-MCNC: 4.2 MMOL/L — SIGNIFICANT CHANGE UP (ref 3.5–5.3)
POTASSIUM SERPL-SCNC: 4.2 MMOL/L — SIGNIFICANT CHANGE UP (ref 3.5–5.3)
PROT SERPL-MCNC: 5.7 G/DL — LOW (ref 6–8.3)
RBC # BLD: 3.89 M/UL — SIGNIFICANT CHANGE UP (ref 3.8–5.2)
RBC # FLD: 13.3 % — SIGNIFICANT CHANGE UP (ref 10.3–14.5)
SODIUM SERPL-SCNC: 139 MMOL/L — SIGNIFICANT CHANGE UP (ref 135–145)
TROPONIN T, HIGH SENSITIVITY RESULT: 12 NG/L — SIGNIFICANT CHANGE UP (ref 0–51)
WBC # BLD: 4.43 K/UL — SIGNIFICANT CHANGE UP (ref 3.8–10.5)
WBC # FLD AUTO: 4.43 K/UL — SIGNIFICANT CHANGE UP (ref 3.8–10.5)

## 2023-05-10 PROCEDURE — 93010 ELECTROCARDIOGRAM REPORT: CPT | Mod: 76

## 2023-05-10 PROCEDURE — 93306 TTE W/DOPPLER COMPLETE: CPT | Mod: 26

## 2023-05-10 RX ORDER — CHLORHEXIDINE GLUCONATE 213 G/1000ML
1 SOLUTION TOPICAL
Refills: 0 | Status: DISCONTINUED | OUTPATIENT
Start: 2023-05-10 | End: 2023-05-12

## 2023-05-10 RX ORDER — SOTALOL HCL 120 MG
80 TABLET ORAL EVERY 24 HOURS
Refills: 0 | Status: DISCONTINUED | OUTPATIENT
Start: 2023-05-10 | End: 2023-05-11

## 2023-05-10 RX ORDER — MAGNESIUM SULFATE 500 MG/ML
2 VIAL (ML) INJECTION ONCE
Refills: 0 | Status: COMPLETED | OUTPATIENT
Start: 2023-05-10 | End: 2023-05-10

## 2023-05-10 RX ORDER — SOTALOL HCL 120 MG
80 TABLET ORAL EVERY 12 HOURS
Refills: 0 | Status: DISCONTINUED | OUTPATIENT
Start: 2023-05-10 | End: 2023-05-10

## 2023-05-10 RX ADMIN — AMLODIPINE BESYLATE 2.5 MILLIGRAM(S): 2.5 TABLET ORAL at 05:05

## 2023-05-10 RX ADMIN — ATORVASTATIN CALCIUM 40 MILLIGRAM(S): 80 TABLET, FILM COATED ORAL at 21:49

## 2023-05-10 RX ADMIN — CHLORHEXIDINE GLUCONATE 1 APPLICATION(S): 213 SOLUTION TOPICAL at 11:42

## 2023-05-10 RX ADMIN — BUDESONIDE AND FORMOTEROL FUMARATE DIHYDRATE 2 PUFF(S): 160; 4.5 AEROSOL RESPIRATORY (INHALATION) at 05:06

## 2023-05-10 RX ADMIN — Medication 80 MILLIGRAM(S): at 18:10

## 2023-05-10 RX ADMIN — APIXABAN 5 MILLIGRAM(S): 2.5 TABLET, FILM COATED ORAL at 05:03

## 2023-05-10 RX ADMIN — BUDESONIDE AND FORMOTEROL FUMARATE DIHYDRATE 2 PUFF(S): 160; 4.5 AEROSOL RESPIRATORY (INHALATION) at 17:18

## 2023-05-10 RX ADMIN — CLOPIDOGREL BISULFATE 75 MILLIGRAM(S): 75 TABLET, FILM COATED ORAL at 11:29

## 2023-05-10 RX ADMIN — Medication 2: at 12:30

## 2023-05-10 RX ADMIN — Medication 25 GRAM(S): at 17:17

## 2023-05-10 RX ADMIN — APIXABAN 5 MILLIGRAM(S): 2.5 TABLET, FILM COATED ORAL at 17:17

## 2023-05-10 RX ADMIN — Medication 225 MILLIGRAM(S): at 05:05

## 2023-05-10 RX ADMIN — VALSARTAN 320 MILLIGRAM(S): 80 TABLET ORAL at 05:03

## 2023-05-10 RX ADMIN — Medication 100 MILLIGRAM(S): at 05:05

## 2023-05-10 NOTE — PROCEDURE NOTE - ADDITIONAL PROCEDURE DETAILS
Indications: AF   Findings: stable lead impedance and sensing. Capture threshold of atrial/ventricular wires not performed given AF w/ intermittent RVR   Review of histograms reveal patient's AT/AF burden 7.5% since 8/2022. Review of histograms reveal patient has been w/o AF since September 2022 until 5/9, and she has been persistently in AF since 5/9. Prior to that, she had high AF burden in July/August of 2022.      Atrial lead BSci 4469 (2005)  Ventricular lead MDT 4074 (2005)  PG (generator change) 2019

## 2023-05-10 NOTE — CONSULT NOTE ADULT - SUBJECTIVE AND OBJECTIVE BOX
CHIEF COMPLAINT: persistent AF     HISTORY OF PRESENT ILLNESS:      Allergies    penicillin (Unknown)    Intolerances    	    MEDICATIONS:  amLODIPine   Tablet 2.5 milliGRAM(s) Oral daily  apixaban 5 milliGRAM(s) Oral every 12 hours  clopidogrel Tablet 75 milliGRAM(s) Oral daily  metoprolol succinate  milliGRAM(s) Oral daily  propafenone  milliGRAM(s) Oral every 12 hours  valsartan 320 milliGRAM(s) Oral daily      benzonatate 100 milliGRAM(s) Oral three times a day PRN  budesonide  80 MICROgram(s)/formoterol 4.5 MICROgram(s) Inhaler 2 Puff(s) Inhalation two times a day    acetaminophen     Tablet .. 650 milliGRAM(s) Oral every 6 hours PRN  melatonin 3 milliGRAM(s) Oral at bedtime PRN  ondansetron Injectable 4 milliGRAM(s) IV Push every 8 hours PRN    aluminum hydroxide/magnesium hydroxide/simethicone Suspension 30 milliLiter(s) Oral every 4 hours PRN    atorvastatin 40 milliGRAM(s) Oral at bedtime  dextrose 50% Injectable 25 Gram(s) IV Push once  dextrose 50% Injectable 12.5 Gram(s) IV Push once  dextrose Oral Gel 15 Gram(s) Oral once PRN  glucagon  Injectable 1 milliGRAM(s) IntraMuscular once  insulin lispro (ADMELOG) corrective regimen sliding scale   SubCutaneous three times a day before meals  insulin lispro (ADMELOG) corrective regimen sliding scale   SubCutaneous at bedtime    chlorhexidine 2% Cloths 1 Application(s) Topical <User Schedule>  dextrose 5%. 1000 milliLiter(s) IV Continuous <Continuous>  dextrose 5%. 1000 milliLiter(s) IV Continuous <Continuous>      PAST MEDICAL & SURGICAL HISTORY:  Atrial Fibrillation      DM (Diabetes Mellitus)      HTN (Hypertension)      Hypercholesteremia      Bradycardia      Cardiac Pacemaker      S/P Tonsillectomy      History of Oophorectomy      Status Post Hernia Repair      S/P Cataract Surgery      Breast Cyst  L breast cyst removed.          FAMILY HISTORY:  Family history of hypertension (Father, Mother, Sibling, Aunt)        SOCIAL HISTORY:    [ ]Non-smoker  [ ] Smoker  [ ] Alcohol      REVIEW OF SYSTEMS:  See HPI. Otherwise, 10 point ROS done and otherwise negative.    PHYSICAL EXAM:  T(C): 37 (05-10-23 @ 10:58), Max: 37 (05-10-23 @ 10:58)  HR: 80 (05-10-23 @ 10:58) (80 - 101)  BP: 137/82 (05-10-23 @ 10:58) (105/64 - 138/74)  RR: 18 (05-10-23 @ 10:58) (16 - 18)  SpO2: 96% (05-10-23 @ 10:58) (96% - 99%)  Wt(kg): --  I&O's Summary    09 May 2023 07:01  -  10 May 2023 07:00  --------------------------------------------------------  IN: 0 mL / OUT: 1 mL / NET: -1 mL        Appearance: Alert. NAD	  HEENT:   NC/AT	  Cardiovascular: +S1S2 RRR no m/g/r  Respiratory: CTA B/L	  Psychiatry: A & O x 3, Mood & affect appropriate  Gastrointestinal:  Soft, NT.ND., + BS	  Skin: No rashes	  Neurologic: Non-focal  Extremities: No edema BLE  Vascular: Peripheral pulses palpable 2+ bilaterally      LABS:	 	    CBC Full  -  ( 10 May 2023 06:27 )  WBC Count : 4.43 K/uL  Hemoglobin : 11.7 g/dL  Hematocrit : 33.4 %  Platelet Count - Automated : 186 K/uL  Mean Cell Volume : 85.9 fl  Mean Cell Hemoglobin : 30.1 pg  Mean Cell Hemoglobin Concentration : 35.0 gm/dL  Auto Neutrophil # : x  Auto Lymphocyte # : x  Auto Monocyte # : x  Auto Eosinophil # : x  Auto Basophil # : x  Auto Neutrophil % : x  Auto Lymphocyte % : x  Auto Monocyte % : x  Auto Eosinophil % : x  Auto Basophil % : x    05-10    139  |  108  |  24<H>  ----------------------------<  154<H>  4.2   |  20<L>  |  0.99  05-09    133<L>  |  100  |  23  ----------------------------<  128<H>  4.6   |  17<L>  |  1.12    Ca    8.9      10 May 2023 06:31  Ca    9.4      09 May 2023 14:51  Mg     1.8     05-10  Mg     1.6     05-09    TPro  5.7<L>  /  Alb  3.6  /  TBili  0.5  /  DBili  x   /  AST  15  /  ALT  17  /  AlkPhos  76  05-10  TPro  6.9  /  Alb  4.4  /  TBili  0.8  /  DBili  x   /  AST  32  /  ALT  23  /  AlkPhos  89  05-09      proBNP:   Lipid Profile:   HgA1c:   TSH:       CARDIAC MARKERS:      TELEMETRY: 	    ECG:  	  RADIOLOGY:  OTHER: 	    PREVIOUS DIAGNOSTIC TESTING:    Echocardiogram: 2/15/23  EF 68%  2-3+ AI  mild-moderate TR with est PA 39 mm Hg    Catheterization: < from: Cardiac Catheterization (04.04.23 @ 08:16) >  Procedures Performed   Procedures:               1.    Arterial Access - Right Radial     2.    PCI: NAVEEN   3.    iFR/dFR/rFR     Indications:                Staged Procedures   Abnormal Cardiovascular Testing     PCI Status:                elective     Conclusions:   Successful PCI with NAVEEN of the pLCx.  The moderate mLAD was  interrogated and found to have negative iFR(0.94).    Recommendations:   Aggressive risk factor modification with diet, exercise, and a goal  LDL of less than 70.  Triple therapy for 1 week and then drop aspirin. Antiplatelet for 6  mths and then switch back to aspirin    Procedure Narrative:   The risks and alternatives of the procedures and conscious sedation  were explained to the patient and informed consent was  obtained. The patient was brought to the cath lab and placed on the  exam table.  Access   Right radial artery:   The puncture site was infiltrated with 1% Lidocaine. Vascular access  was obtained using modified seldinger technique.    Diagnostic Findings:     Coronary Angiography   The coronary circulation is right dominant.      LM   Left main artery: Angiography shows minor irregularities.      LAD   Mid left anterior descending: There is a 50 % stenosis. Instant  wave-free ratio was performed with a calculated value of 0.94.  Based on the results of this study, the lesion was judged to be  non-significant and no intervention was performed.    CX   Mid circumflex: There is a 90 % stenosis.      < end of copied text >       CHIEF COMPLAINT: persistent AF     HISTORY OF PRESENT ILLNESS:  82 year old Female Yazidi with PMHx of HTN, pAF on Eliquis and Propafenone, Tachy-Bryan syndrome s/p PPM, T2DM, CAD s/p recent NAVEEN to Cx 4/4/23, presents for lightheadedness for the past two days. Of note, she received an alert from her Cardiology office that her remote interrogation revealed persistent AF since 5/4. Patient reports feeling as if she is going to pass out. She also reports generalized weakness in her legs. She reports no fall or trauma. She reports intermittent palpitations, no chest pain or shortness of breath, she has ongoing dyspnea with exertion which is unchanged, denies lower extremity edema. She reports no fever or chills. She has several loose bowel movements on day prior to admission which has since resolved.     Allergies    penicillin (Unknown)    Intolerances    	    MEDICATIONS:  amLODIPine   Tablet 2.5 milliGRAM(s) Oral daily  apixaban 5 milliGRAM(s) Oral every 12 hours  clopidogrel Tablet 75 milliGRAM(s) Oral daily  metoprolol succinate  milliGRAM(s) Oral daily  propafenone  milliGRAM(s) Oral every 12 hours  valsartan 320 milliGRAM(s) Oral daily      benzonatate 100 milliGRAM(s) Oral three times a day PRN  budesonide  80 MICROgram(s)/formoterol 4.5 MICROgram(s) Inhaler 2 Puff(s) Inhalation two times a day    acetaminophen     Tablet .. 650 milliGRAM(s) Oral every 6 hours PRN  melatonin 3 milliGRAM(s) Oral at bedtime PRN  ondansetron Injectable 4 milliGRAM(s) IV Push every 8 hours PRN    aluminum hydroxide/magnesium hydroxide/simethicone Suspension 30 milliLiter(s) Oral every 4 hours PRN    atorvastatin 40 milliGRAM(s) Oral at bedtime  dextrose 50% Injectable 25 Gram(s) IV Push once  dextrose 50% Injectable 12.5 Gram(s) IV Push once  dextrose Oral Gel 15 Gram(s) Oral once PRN  glucagon  Injectable 1 milliGRAM(s) IntraMuscular once  insulin lispro (ADMELOG) corrective regimen sliding scale   SubCutaneous three times a day before meals  insulin lispro (ADMELOG) corrective regimen sliding scale   SubCutaneous at bedtime    chlorhexidine 2% Cloths 1 Application(s) Topical <User Schedule>  dextrose 5%. 1000 milliLiter(s) IV Continuous <Continuous>  dextrose 5%. 1000 milliLiter(s) IV Continuous <Continuous>      PAST MEDICAL & SURGICAL HISTORY:  Atrial Fibrillation      DM (Diabetes Mellitus)      HTN (Hypertension)      Hypercholesteremia      Bradycardia      Cardiac Pacemaker      S/P Tonsillectomy      History of Oophorectomy      Status Post Hernia Repair      S/P Cataract Surgery      Breast Cyst  L breast cyst removed.          FAMILY HISTORY:  Family history of hypertension (Father, Mother, Sibling, Aunt)        SOCIAL HISTORY:    [ ]Non-smoker  [ ] Smoker  [ ] Alcohol      REVIEW OF SYSTEMS:  See HPI. Otherwise, 10 point ROS done and otherwise negative.    PHYSICAL EXAM:  T(C): 37 (05-10-23 @ 10:58), Max: 37 (05-10-23 @ 10:58)  HR: 80 (05-10-23 @ 10:58) (80 - 101)  BP: 137/82 (05-10-23 @ 10:58) (105/64 - 138/74)  RR: 18 (05-10-23 @ 10:58) (16 - 18)  SpO2: 96% (05-10-23 @ 10:58) (96% - 99%)  Wt(kg): --  I&O's Summary    09 May 2023 07:01  -  10 May 2023 07:00  --------------------------------------------------------  IN: 0 mL / OUT: 1 mL / NET: -1 mL        Appearance: Alert. NAD	  HEENT:   NC/AT	  Cardiovascular: +S1S2 RRR no m/g/r  Respiratory: CTA B/L	  Psychiatry: A & O x 3, Mood & affect appropriate  Gastrointestinal:  Soft, NT.ND., + BS	  Skin: No rashes	  Neurologic: Non-focal  Extremities: No edema BLE  Vascular: Peripheral pulses palpable 2+ bilaterally      LABS:	 	    CBC Full  -  ( 10 May 2023 06:27 )  WBC Count : 4.43 K/uL  Hemoglobin : 11.7 g/dL  Hematocrit : 33.4 %  Platelet Count - Automated : 186 K/uL  Mean Cell Volume : 85.9 fl  Mean Cell Hemoglobin : 30.1 pg  Mean Cell Hemoglobin Concentration : 35.0 gm/dL  Auto Neutrophil # : x  Auto Lymphocyte # : x  Auto Monocyte # : x  Auto Eosinophil # : x  Auto Basophil # : x  Auto Neutrophil % : x  Auto Lymphocyte % : x  Auto Monocyte % : x  Auto Eosinophil % : x  Auto Basophil % : x    05-10    139  |  108  |  24<H>  ----------------------------<  154<H>  4.2   |  20<L>  |  0.99  05-09    133<L>  |  100  |  23  ----------------------------<  128<H>  4.6   |  17<L>  |  1.12    Ca    8.9      10 May 2023 06:31  Ca    9.4      09 May 2023 14:51  Mg     1.8     05-10  Mg     1.6     05-09    TPro  5.7<L>  /  Alb  3.6  /  TBili  0.5  /  DBili  x   /  AST  15  /  ALT  17  /  AlkPhos  76  05-10  TPro  6.9  /  Alb  4.4  /  TBili  0.8  /  DBili  x   /  AST  32  /  ALT  23  /  AlkPhos  89  05-09      proBNP:   Lipid Profile:   HgA1c:   TSH:       CARDIAC MARKERS:      TELEMETRY: 	  AF- 's  ECG:  	  RADIOLOGY:  OTHER: 	    PREVIOUS DIAGNOSTIC TESTING:    Echocardiogram: 2/15/23  EF 68%  2-3+ AI  mild-moderate TR with est PA 39 mm Hg    Catheterization: < from: Cardiac Catheterization (04.04.23 @ 08:16) >  Procedures Performed   Procedures:               1.    Arterial Access - Right Radial     2.    PCI: NAVEEN   3.    iFR/dFR/rFR     Indications:                Staged Procedures   Abnormal Cardiovascular Testing     PCI Status:                elective     Conclusions:   Successful PCI with NAVEEN of the pLCx.  The moderate mLAD was  interrogated and found to have negative iFR(0.94).    Recommendations:   Aggressive risk factor modification with diet, exercise, and a goal  LDL of less than 70.  Triple therapy for 1 week and then drop aspirin. Antiplatelet for 6  mths and then switch back to aspirin    Procedure Narrative:   The risks and alternatives of the procedures and conscious sedation  were explained to the patient and informed consent was  obtained. The patient was brought to the cath lab and placed on the  exam table.  Access   Right radial artery:   The puncture site was infiltrated with 1% Lidocaine. Vascular access  was obtained using modified seldinger technique.    Diagnostic Findings:     Coronary Angiography   The coronary circulation is right dominant.      LM   Left main artery: Angiography shows minor irregularities.      LAD   Mid left anterior descending: There is a 50 % stenosis. Instant  wave-free ratio was performed with a calculated value of 0.94.  Based on the results of this study, the lesion was judged to be  non-significant and no intervention was performed.    CX   Mid circumflex: There is a 90 % stenosis.      < end of copied text >       CHIEF COMPLAINT: persistent AF     HISTORY OF PRESENT ILLNESS:  82 year old Female Jew with PMHx of HTN, pAF on Eliquis and Propafenone, tachy-morgan syndrome/SND s/p PPM, T2DM, CAD s/p recent NAVEEN to Cx 4/4/23, presents for lightheadedness for the past two days. Of note, she received an alert from her Cardiology office that her remote interrogation revealed persistent AF since 5/9. Patient reports feeling as if she is going to pass out. She also reports generalized weakness in her legs. She reports no fall or trauma. She reports intermittent palpitations, no chest pain or shortness of breath, she has ongoing dyspnea with exertion which is unchanged, denies lower extremity edema. She reports no fever or chills. She has several loose bowel movements on day prior to admission which has since resolved.     On telemetry she is AF intermittently AF- with rates up to 140's. She is asymptomatic presently. EP consulted for rhythm control.     Allergies    penicillin (Unknown)    Intolerances    	    MEDICATIONS:  amLODIPine   Tablet 2.5 milliGRAM(s) Oral daily  apixaban 5 milliGRAM(s) Oral every 12 hours  clopidogrel Tablet 75 milliGRAM(s) Oral daily  metoprolol succinate  milliGRAM(s) Oral daily  propafenone  milliGRAM(s) Oral every 12 hours  valsartan 320 milliGRAM(s) Oral daily      benzonatate 100 milliGRAM(s) Oral three times a day PRN  budesonide  80 MICROgram(s)/formoterol 4.5 MICROgram(s) Inhaler 2 Puff(s) Inhalation two times a day    acetaminophen     Tablet .. 650 milliGRAM(s) Oral every 6 hours PRN  melatonin 3 milliGRAM(s) Oral at bedtime PRN  ondansetron Injectable 4 milliGRAM(s) IV Push every 8 hours PRN    aluminum hydroxide/magnesium hydroxide/simethicone Suspension 30 milliLiter(s) Oral every 4 hours PRN    atorvastatin 40 milliGRAM(s) Oral at bedtime  dextrose 50% Injectable 25 Gram(s) IV Push once  dextrose 50% Injectable 12.5 Gram(s) IV Push once  dextrose Oral Gel 15 Gram(s) Oral once PRN  glucagon  Injectable 1 milliGRAM(s) IntraMuscular once  insulin lispro (ADMELOG) corrective regimen sliding scale   SubCutaneous three times a day before meals  insulin lispro (ADMELOG) corrective regimen sliding scale   SubCutaneous at bedtime    chlorhexidine 2% Cloths 1 Application(s) Topical <User Schedule>  dextrose 5%. 1000 milliLiter(s) IV Continuous <Continuous>  dextrose 5%. 1000 milliLiter(s) IV Continuous <Continuous>      PAST MEDICAL & SURGICAL HISTORY:  Atrial Fibrillation      DM (Diabetes Mellitus)      HTN (Hypertension)      Hypercholesteremia      Bradycardia      Cardiac Pacemaker      S/P Tonsillectomy      History of Oophorectomy      Status Post Hernia Repair      S/P Cataract Surgery      Breast Cyst  L breast cyst removed.          FAMILY HISTORY:  Family history of hypertension (Father, Mother, Sibling, Aunt)        SOCIAL HISTORY:    Denies tobacco, EtOH, illicit drug use   Endorses caffeine use    REVIEW OF SYSTEMS:  See HPI. Otherwise, 10 point ROS done and otherwise negative.    PHYSICAL EXAM:  T(C): 37 (05-10-23 @ 10:58), Max: 37 (05-10-23 @ 10:58)  HR: 80 (05-10-23 @ 10:58) (80 - 101)  BP: 137/82 (05-10-23 @ 10:58) (105/64 - 138/74)  RR: 18 (05-10-23 @ 10:58) (16 - 18)  SpO2: 96% (05-10-23 @ 10:58) (96% - 99%)  Wt(kg): --  I&O's Summary    09 May 2023 07:01  -  10 May 2023 07:00  --------------------------------------------------------  IN: 0 mL / OUT: 1 mL / NET: -1 mL        Appearance: Alert. NAD	  HEENT:   NC/AT	  Cardiovascular: +S1S2 irregular no m/g/r  Respiratory: CTA B/L	  Psychiatry: A & O x 3, Mood & affect appropriate  Gastrointestinal:  Soft, NT.ND., + BS	  Skin: No rashes, masses or lesions  Neurologic: Non-focal  Extremities: No edema BLE  Vascular: Peripheral pulses palpable 2+ bilaterally      LABS:	 	    CBC Full  -  ( 10 May 2023 06:27 )  WBC Count : 4.43 K/uL  Hemoglobin : 11.7 g/dL  Hematocrit : 33.4 %  Platelet Count - Automated : 186 K/uL  Mean Cell Volume : 85.9 fl  Mean Cell Hemoglobin : 30.1 pg  Mean Cell Hemoglobin Concentration : 35.0 gm/dL  Auto Neutrophil # : x  Auto Lymphocyte # : x  Auto Monocyte # : x  Auto Eosinophil # : x  Auto Basophil # : x  Auto Neutrophil % : x  Auto Lymphocyte % : x  Auto Monocyte % : x  Auto Eosinophil % : x  Auto Basophil % : x    05-10    139  |  108  |  24<H>  ----------------------------<  154<H>  4.2   |  20<L>  |  0.99  05-09    133<L>  |  100  |  23  ----------------------------<  128<H>  4.6   |  17<L>  |  1.12    Ca    8.9      10 May 2023 06:31  Ca    9.4      09 May 2023 14:51  Mg     1.8     05-10  Mg     1.6     05-09    TPro  5.7<L>  /  Alb  3.6  /  TBili  0.5  /  DBili  x   /  AST  15  /  ALT  17  /  AlkPhos  76  05-10  TPro  6.9  /  Alb  4.4  /  TBili  0.8  /  DBili  x   /  AST  32  /  ALT  23  /  AlkPhos  89  05-09      TELEMETRY: 	  AF- 's  ECG:  	  RADIOLOGY:  TTE: < from: TTE W or WO Ultrasound Enhancing Agent (05.10.23 @ 09:13) >  _______________________________________________________________________________________  CONCLUSIONS:      1. Normal left ventricular cavity size. The left ventricular wall thickness is normal. The left ventricular systolic function is normal with an ejection fraction visually estimated at 70 to 75 %. There are no regional wall motion abnormalities seen.   2. Normal right ventricular cavity size and normal systolic function.    ________________________________________________________________________________________  FINDINGS:  Left Ventricle:  Normal left ventricular cavity size. The left ventricular wall thickness is normal. The left ventricular systolic function is normal with an ejection fraction visually estimated at 70 to 75%. There are no regional wall motion abnormalities seen. The left ventricular diastolic function is indeterminate, with normal filling pressure.     Right Ventricle:  Normal right ventricular cavity size and normal systolic function. Tricuspid annular plane systolic excursion (TAPSE) is 1.8 cm(normal >=1.7 cm). A device lead is visualized.     Left Atrium:  The left atrium is normal, with an indexed volume of 22 ml/m².     Right Atrium:  The right atrium is normal. There is a pacer lead seen in the right atrium.     Aortic Valve:  The aortic valve is tricuspid with normal structure without stenosis. There is mild aortic regurgitation.     Mitral Valve:  Structurally normal mitral valve with normal leaflet excursion. There is no mitral valve stenosis. There is trace mitral regurgitation.     Tricuspid Valve:  Structurally normal tricuspid valve with normal leaflet excursion. There is mild tricuspid regurgitation. Estimated pulmonary artery systolic pressure is 30 mmHg.     Pulmonic Valve:  Structurally normal pulmonic valve with normal leaflet excursion. There is trace pulmonic regurgitation.     Aorta:  The aortic annulus and aortic root appear normal in size. Aortic root at sinuses of Valsalva is normal measuring 2.50 cm (indexed 1.43 cm/m²).     Pericardium:  No pericardial effusion seen.     Systemic Veins:  The inferior vena cava is normal measuring 1.40 cm in diameter, (normal <2.1cm) with normal inspiratory collapse (normal >50%) consistent with normal right atrial pressure (~3, range 0-5mmHg).  ____________________________________________________________________    < end of copied text >      PREVIOUS DIAGNOSTIC TESTING:    Echocardiogram: 2/15/23  EF 68%  2-3+ AI  mild-moderate TR with est PA 39 mm Hg    Catheterization: < from: Cardiac Catheterization (04.04.23 @ 08:16) >  Procedures Performed   Procedures:               1.    Arterial Access - Right Radial     2.    PCI: NAVEEN   3.    iFR/dFR/rFR     Indications:                Staged Procedures   Abnormal Cardiovascular Testing     PCI Status:                elective     Conclusions:   Successful PCI with NAVEEN of the pLCx.  The moderate mLAD was  interrogated and found to have negative iFR(0.94).    Recommendations:   Aggressive risk factor modification with diet, exercise, and a goal  LDL of less than 70.  Triple therapy for 1 week and then drop aspirin. Antiplatelet for 6  mths and then switch back to aspirin    Procedure Narrative:   The risks and alternatives of the procedures and conscious sedation  were explained to the patient and informed consent was  obtained. The patient was brought to the cath lab and placed on the  exam table.  Access   Right radial artery:   The puncture site was infiltrated with 1% Lidocaine. Vascular access  was obtained using modified seldinger technique.    Diagnostic Findings:     Coronary Angiography   The coronary circulation is right dominant.      LM   Left main artery: Angiography shows minor irregularities.      LAD   Mid left anterior descending: There is a 50 % stenosis. Instant  wave-free ratio was performed with a calculated value of 0.94.  Based on the results of this study, the lesion was judged to be  non-significant and no intervention was performed.    CX   Mid circumflex: There is a 90 % stenosis.      < end of copied text >       CHIEF COMPLAINT: persistent AF     HISTORY OF PRESENT ILLNESS:  82 year old Female Jain with PMHx of HTN, pAF on Eliquis and Propafenone, tachy-morgan syndrome/SND s/p PPM, T2DM, CAD s/p recent NAVEEN to Cx 23, presents for lightheadedness for the past two days. Of note, she received an alert from her Cardiology office that her remote interrogation revealed persistent AF since . Patient reports feeling as if she is going to pass out. She also reports generalized weakness in her legs. She reports no fall or trauma. She reports intermittent palpitations, no chest pain or shortness of breath, she has ongoing dyspnea with exertion which is unchanged, denies lower extremity edema. She reports no fever or chills. She has several loose bowel movements on day prior to admission which has since resolved.     On telemetry she is AF intermittently AF- with rates up to 140's. She is asymptomatic presently. EP consulted for rhythm control.     Allergies    penicillin (Unknown)    Intolerances    	    MEDICATIONS:  amLODIPine   Tablet 2.5 milliGRAM(s) Oral daily  apixaban 5 milliGRAM(s) Oral every 12 hours  clopidogrel Tablet 75 milliGRAM(s) Oral daily  metoprolol succinate  milliGRAM(s) Oral daily  propafenone  milliGRAM(s) Oral every 12 hours  valsartan 320 milliGRAM(s) Oral daily      benzonatate 100 milliGRAM(s) Oral three times a day PRN  budesonide  80 MICROgram(s)/formoterol 4.5 MICROgram(s) Inhaler 2 Puff(s) Inhalation two times a day    acetaminophen     Tablet .. 650 milliGRAM(s) Oral every 6 hours PRN  melatonin 3 milliGRAM(s) Oral at bedtime PRN  ondansetron Injectable 4 milliGRAM(s) IV Push every 8 hours PRN    aluminum hydroxide/magnesium hydroxide/simethicone Suspension 30 milliLiter(s) Oral every 4 hours PRN    atorvastatin 40 milliGRAM(s) Oral at bedtime  dextrose 50% Injectable 25 Gram(s) IV Push once  dextrose 50% Injectable 12.5 Gram(s) IV Push once  dextrose Oral Gel 15 Gram(s) Oral once PRN  glucagon  Injectable 1 milliGRAM(s) IntraMuscular once  insulin lispro (ADMELOG) corrective regimen sliding scale   SubCutaneous three times a day before meals  insulin lispro (ADMELOG) corrective regimen sliding scale   SubCutaneous at bedtime    chlorhexidine 2% Cloths 1 Application(s) Topical <User Schedule>  dextrose 5%. 1000 milliLiter(s) IV Continuous <Continuous>  dextrose 5%. 1000 milliLiter(s) IV Continuous <Continuous>      PAST MEDICAL & SURGICAL HISTORY:  Atrial Fibrillation      DM (Diabetes Mellitus)      HTN (Hypertension)      Hypercholesteremia      Bradycardia      Cardiac Pacemaker      S/P Tonsillectomy      History of Oophorectomy      Status Post Hernia Repair      S/P Cataract Surgery      Breast Cyst  L breast cyst removed.          FAMILY HISTORY:  Family history of hypertension (Father, Mother, Sibling, Aunt)        SOCIAL HISTORY:    Denies tobacco, EtOH, illicit drug use   Endorses caffeine use    REVIEW OF SYSTEMS:  See HPI. Otherwise, 10 point ROS done and otherwise negative.    PHYSICAL EXAM:  T(C): 37 (05-10-23 @ 10:58), Max: 37 (05-10-23 @ 10:58)  HR: 80 (05-10-23 @ 10:58) (80 - 101)  BP: 137/82 (05-10-23 @ 10:58) (105/64 - 138/74)  RR: 18 (05-10-23 @ 10:58) (16 - 18)  SpO2: 96% (05-10-23 @ 10:58) (96% - 99%)  Wt(kg): --  I&O's Summary    09 May 2023 07:01  -  10 May 2023 07:00  --------------------------------------------------------  IN: 0 mL / OUT: 1 mL / NET: -1 mL        Appearance: Alert. NAD	  HEENT:   NC/AT	  Cardiovascular: +S1S2 irregular no m/g/r  Respiratory: CTA B/L	  Psychiatry: A & O x 3, Mood & affect appropriate  Gastrointestinal:  Soft, NT.ND., + BS	  Skin: No rashes, masses or lesions  Neurologic: Non-focal  Extremities: No edema BLE  Vascular: Peripheral pulses palpable 2+ bilaterally      LABS:	 	    CBC Full  -  ( 10 May 2023 06:27 )  WBC Count : 4.43 K/uL  Hemoglobin : 11.7 g/dL  Hematocrit : 33.4 %  Platelet Count - Automated : 186 K/uL  Mean Cell Volume : 85.9 fl  Mean Cell Hemoglobin : 30.1 pg  Mean Cell Hemoglobin Concentration : 35.0 gm/dL  Auto Neutrophil # : x  Auto Lymphocyte # : x  Auto Monocyte # : x  Auto Eosinophil # : x  Auto Basophil # : x  Auto Neutrophil % : x  Auto Lymphocyte % : x  Auto Monocyte % : x  Auto Eosinophil % : x  Auto Basophil % : x    05-10    139  |  108  |  24<H>  ----------------------------<  154<H>  4.2   |  20<L>  |  0.99      133<L>  |  100  |  23  ----------------------------<  128<H>  4.6   |  17<L>  |  1.12    Ca    8.9      10 May 2023 06:31  Ca    9.4      09 May 2023 14:51  Mg     1.8     05-10  Mg     1.6         TPro  5.7<L>  /  Alb  3.6  /  TBili  0.5  /  DBili  x   /  AST  15  /  ALT  17  /  AlkPhos  76  05-10  TPro  6.9  /  Alb  4.4  /  TBili  0.8  /  DBili  x   /  AST  32  /  ALT  23  /  AlkPhos  89        TELEMETRY: 	  AF- 's  EC/9 AF 98bpm QRS narrow QTC 440ms (manually calculated)        < from: 12 Lead ECG (23 @ 08:44) >  Ventricular Rate 60 BPM    Atrial Rate 60 BPM    P-R Interval 258 ms    QRS Duration 98 ms    Q-T Interval 442 ms    QTC Calculation(Bazett) 442 ms    R Axis -9 degrees    T Axis 84 degrees    Diagnosis Line Atrial-paced rhythm with prolonged AV conduction  CANNOT RULE OUT ANTERIOR INFARCT , AGE UNDETERMINED  ABNORMAL ECG  WHEN COMPARED WITH ECG OF 2023 11:12, (UNCONFIRMED)  NO SIGNIFICANT CHANGE WAS FOUND    < end of copied text >  	    TTE: < from: TTE W or WO Ultrasound Enhancing Agent (05.10.23 @ 09:13) >  _______________________________________________________________________________________  CONCLUSIONS:      1. Normal left ventricular cavity size. The left ventricular wall thickness is normal. The left ventricular systolic function is normal with an ejection fraction visually estimated at 70 to 75 %. There are no regional wall motion abnormalities seen.   2. Normal right ventricular cavity size and normal systolic function.    ________________________________________________________________________________________  FINDINGS:  Left Ventricle:  Normal left ventricular cavity size. The left ventricular wall thickness is normal. The left ventricular systolic function is normal with an ejection fraction visually estimated at 70 to 75%. There are no regional wall motion abnormalities seen. The left ventricular diastolic function is indeterminate, with normal filling pressure.     Right Ventricle:  Normal right ventricular cavity size and normal systolic function. Tricuspid annular plane systolic excursion (TAPSE) is 1.8 cm(normal >=1.7 cm). A device lead is visualized.     Left Atrium:  The left atrium is normal, with an indexed volume of 22 ml/m².     Right Atrium:  The right atrium is normal. There is a pacer lead seen in the right atrium.     Aortic Valve:  The aortic valve is tricuspid with normal structure without stenosis. There is mild aortic regurgitation.     Mitral Valve:  Structurally normal mitral valve with normal leaflet excursion. There is no mitral valve stenosis. There is trace mitral regurgitation.     Tricuspid Valve:  Structurally normal tricuspid valve with normal leaflet excursion. There is mild tricuspid regurgitation. Estimated pulmonary artery systolic pressure is 30 mmHg.     Pulmonic Valve:  Structurally normal pulmonic valve with normal leaflet excursion. There is trace pulmonic regurgitation.     Aorta:  The aortic annulus and aortic root appear normal in size. Aortic root at sinuses of Valsalva is normal measuring 2.50 cm (indexed 1.43 cm/m²).     Pericardium:  No pericardial effusion seen.     Systemic Veins:  The inferior vena cava is normal measuring 1.40 cm in diameter, (normal <2.1cm) with normal inspiratory collapse (normal >50%) consistent with normal right atrial pressure (~3, range 0-5mmHg).  ____________________________________________________________________    < end of copied text >      PREVIOUS DIAGNOSTIC TESTING:    Echocardiogram: 2/15/23  EF 68%  2-3+ AI  mild-moderate TR with est PA 39 mm Hg    Catheterization: < from: Cardiac Catheterization (23 @ 08:16) >  Procedures Performed   Procedures:               1.    Arterial Access - Right Radial     2.    PCI: NAVEEN   3.    iFR/dFR/rFR     Indications:                Staged Procedures   Abnormal Cardiovascular Testing     PCI Status:                elective     Conclusions:   Successful PCI with NAVEEN of the pLCx.  The moderate mLAD was  interrogated and found to have negative iFR(0.94).    Recommendations:   Aggressive risk factor modification with diet, exercise, and a goal  LDL of less than 70.  Triple therapy for 1 week and then drop aspirin. Antiplatelet for 6  mths and then switch back to aspirin    Procedure Narrative:   The risks and alternatives of the procedures and conscious sedation  were explained to the patient and informed consent was  obtained. The patient was brought to the cath lab and placed on the  exam table.  Access   Right radial artery:   The puncture site was infiltrated with 1% Lidocaine. Vascular access  was obtained using modified seldinger technique.    Diagnostic Findings:     Coronary Angiography   The coronary circulation is right dominant.      LM   Left main artery: Angiography shows minor irregularities.      LAD   Mid left anterior descending: There is a 50 % stenosis. Instant  wave-free ratio was performed with a calculated value of 0.94.  Based on the results of this study, the lesion was judged to be  non-significant and no intervention was performed.    CX   Mid circumflex: There is a 90 % stenosis.      < end of copied text >

## 2023-05-10 NOTE — CONSULT NOTE ADULT - SUBJECTIVE AND OBJECTIVE BOX
82 year old female with a history of a PPM and PAF controlled with Propafenone for a number of years.  She has been in AF since 5/4/23 seen on her PPM alert.  She is now admitted with weakness and fatigue.  She recently underwent PCI of a mid Cx on 4/4/23  The LAD at that time revealed a 50% stenosis not deemed to be hemodynamically significant by FFR.    PMH;  HTN   elevated cholesterol    Meds:  Eliquis 5 mg bid             Propafenone 225 mg bid             Plavix 75 mg qd             Amlodipine 2.5 mg qd             Atorvastatin 40 mg qd             Symbicort             Metoprolol 100 mg qd              Valsartan 320 mg qd    /82  HR 80 (AF)  Lungs clear  Irregular rhythm 1/6 systolic murmur  No edema    EKG:  AF diffuse ST-T changes    Echo - 2/15/23  EF 68%  2-3+ AI  mild-moderate TR with est PA 39 mm Hg    BUN 24  Crt 0.99  WBC 4.43  Hgb 11.7  Hct 33.4  Plt 186K  Troponin  12  12    Imp:  New AF of 6 days duration - symptomatic.  Her rate is fairly well controlled.  No CHF or evidence of coronary ischemia / MI  Rec:  EP evaluation - called for a GERARDO DCCV    The patient was advised that her antiarrythmic regimen should be changed from Propafenone ( CAD ) to either Multaq or Sotalol.  Ultimately she would likely benefit from RFA but she is quite fearful of any procedures

## 2023-05-10 NOTE — CONSULT NOTE ADULT - ASSESSMENT
82 year old Female [employer] with PMHx of HTN, pAF on Eliquis and Propafenone, Tachy-Bryan syndrome s/p PPM, T2DM, CAD s/p recent NAVEEN to Cx 4/4/23, presents for lightheadedness, generalized weakness, intermittent palpations. She has ongoing QUEZADA which is unchanged from her baseline. Of note, she received an alert from her Cardiology office that her remote interrogation revealed persistent AF since 5/4 for which EP is consulted.     1) pAF, now persistent since 5/4  2) TBS s/p dual chamber PPM     - NPO s/p mn for DCCV 5/11  - D/c Propafenone, continue Toprol for now   - Will plan for Sotalol loading s/p DCCV      82 year old Female [employer] with PMHx of HTN, pAF on Eliquis and Propafenone, Tachy-Bryan syndrome/SND s/p PPM, T2DM, CAD s/p recent NAVEEN to Cx 4/4/23, presents for lightheadedness, generalized weakness, intermittent palpations. She has ongoing QUEZADA which is unchanged from her baseline. TTE this admisison revealing hyperdynamic LV function 70-75%, no valvular abnormalities, normal RV function. She received an alert from her Cardiology office that her remote interrogation revealed persistent AF since 5/9 for which EP is consulted.     Review of device interrogation revels patient's AT/AF burden 7.5% since 8/2022. Review of histograms reveal patient has been w/o AF since September 2022 until 5/9, and she has been persistently in AF since 5/9. Prior to that, she had high AF burden in July/August of 2022.      1) pAF, now persistent since 5/9  2) TBS/SND s/p dual chamber PPM     - Continue Eliquis 5mg bid. Patient endorses compliance w/ AC and states she has never missed a dose   - At this time, patient is refusing DCCV and procedures. She prefers medication management. Discussed keeping NPO s/p mn for DCCV 5/11 in the event she changes her mind tomorrow AM   - D/c Propafenone, continue Toprol for now   - Will d/w Dr. Ledbetter plan for Sotalol initiation given patient refusing DCCV     82 year old Female [employer] with PMHx of HTN, pAF on Eliquis and Propafenone, Tachy-Bryan syndrome/SND s/p PPM, T2DM, CAD s/p recent NAVEEN to Cx 4/4/23, presents for lightheadedness, generalized weakness, intermittent palpations. She has ongoing QUEZADA which is unchanged from her baseline. TTE this admisison revealing hyperdynamic LV function 70-75%, no valvular abnormalities, normal RV function. She received an alert from her Cardiology office that her remote interrogation revealed persistent AF since 5/9 for which EP is consulted.     Review of device interrogation revels patient's AT/AF burden 7.5% since 8/2022. Review of histograms reveal patient has been w/o AF since September 2022 until 5/9, and she has been persistently in AF since 5/9. Prior to that, she had high AF burden in July/August of 2022.      1) pAF, now persistent since 5/9  2) TBS/SND s/p dual chamber PPM     - Continue Eliquis 5mg bid. Patient endorses compliance w/ AC and states she has never missed a dose   - At this time, patient is refusing DCCV and procedures. She prefers medication management. Discussed keeping NPO s/p mn for DCCV 5/11 in the event she changes her mind tomorrow AM   - D/c Propafenone, d/c Toprol   - Start Sotalol 80mg bid as of tonight. Baseline QTC 440ms. Hold for QTC >500ms. Perform EKG 2 hours s/p dose administration for the first 5 doses. If patient does not convert on Sotalol load will readdress DCCV   82 year old Female [employer] with PMHx of HTN, pAF on Eliquis and Propafenone, Tachy-Bryan syndrome/SND s/p PPM, T2DM, CAD s/p recent NAVEEN to Cx 4/4/23, presents for lightheadedness, generalized weakness, intermittent palpations. She has ongoing QUEZADA which is unchanged from her baseline. TTE this admisison revealing hyperdynamic LV function 70-75%, no valvular abnormalities, normal RV function. She received an alert from her Cardiology office that her remote interrogation revealed persistent AF since 5/9 for which EP is consulted.     Review of device interrogation revels patient's AT/AF burden 7.5% since 8/2022. Review of histograms reveal patient has been w/o AF since September 2022 until 5/9, and she has been persistently in AF since 5/9. Prior to that, she had high AF burden in July/August of 2022.      1) pAF, now persistent since 5/9  2) TBS/SND s/p dual chamber PPM     - Continue Eliquis 5mg bid. Patient endorses compliance w/ AC and states she has never missed a dose   - At this time, patient is refusing DCCV and procedures. She prefers medication management. Discussed keeping NPO s/p mn for DCCV 5/11 in the event she changes her mind tomorrow AM   - D/c Propafenone, d/c Toprol   - Start Sotalol 80mg bid as of tonight. Baseline QTC 440ms. Hold for QTC >500ms. Perform EKG 2 hours s/p dose administration for the first 5 doses. If patient does not convert on Sotalol load will readdress DCCV  - replete lytes for K>4 and Mg >2, give STAT dose 2gm Mg now  82 year old Female [employer] with PMHx of HTN, pAF on Eliquis and Propafenone, Tachy-Bryan syndrome/SND s/p PPM, T2DM, CAD s/p recent NAVEEN to Cx 4/4/23, presents for lightheadedness, generalized weakness, intermittent palpations. She has ongoing QUEZADA which is unchanged from her baseline. TTE this admisison revealing hyperdynamic LV function 70-75%, no valvular abnormalities, normal RV function. She received an alert from her Cardiology office that her remote interrogation revealed persistent AF since 5/9 for which EP is consulted.     Review of device interrogation revels patient's AT/AF burden 7.5% since 8/2022. Review of histograms reveal patient has been w/o AF since September 2022 until 5/9, and she has been persistently in AF since 5/9. Prior to that, she had high AF burden in July/August of 2022.      1) pAF, now persistent since 5/9  2) TBS/SND s/p dual chamber PPM     - Continue Eliquis 5mg bid. Patient endorses compliance w/ AC and states she has never missed a dose   - At this time, patient is refusing DCCV and procedures. She prefers medication management  - D/c Propafenone, d/c Toprol   - Start Sotalol 80mg bid as of tonight. Baseline QTC 440ms. Hold for QTC >500ms. Perform EKG 2 hours s/p dose administration for the first 5 doses. If patient does not convert on Sotalol load will readdress DCCV  - replete lytes for K>4 and Mg >2, give STAT dose 2gm Mg now

## 2023-05-11 ENCOUNTER — TRANSCRIPTION ENCOUNTER (OUTPATIENT)
Age: 83
End: 2023-05-11

## 2023-05-11 LAB
ANION GAP SERPL CALC-SCNC: 13 MMOL/L — SIGNIFICANT CHANGE UP (ref 5–17)
BUN SERPL-MCNC: 22 MG/DL — SIGNIFICANT CHANGE UP (ref 7–23)
CALCIUM SERPL-MCNC: 9.2 MG/DL — SIGNIFICANT CHANGE UP (ref 8.4–10.5)
CHLORIDE SERPL-SCNC: 107 MMOL/L — SIGNIFICANT CHANGE UP (ref 96–108)
CO2 SERPL-SCNC: 21 MMOL/L — LOW (ref 22–31)
CREAT SERPL-MCNC: 1.07 MG/DL — SIGNIFICANT CHANGE UP (ref 0.5–1.3)
EGFR: 52 ML/MIN/1.73M2 — LOW
GLUCOSE BLDC GLUCOMTR-MCNC: 109 MG/DL — HIGH (ref 70–99)
GLUCOSE BLDC GLUCOMTR-MCNC: 137 MG/DL — HIGH (ref 70–99)
GLUCOSE BLDC GLUCOMTR-MCNC: 157 MG/DL — HIGH (ref 70–99)
GLUCOSE BLDC GLUCOMTR-MCNC: 163 MG/DL — HIGH (ref 70–99)
GLUCOSE SERPL-MCNC: 153 MG/DL — HIGH (ref 70–99)
MRSA PCR RESULT.: SIGNIFICANT CHANGE UP
POTASSIUM SERPL-MCNC: 3.8 MMOL/L — SIGNIFICANT CHANGE UP (ref 3.5–5.3)
POTASSIUM SERPL-SCNC: 3.8 MMOL/L — SIGNIFICANT CHANGE UP (ref 3.5–5.3)
S AUREUS DNA NOSE QL NAA+PROBE: SIGNIFICANT CHANGE UP
SODIUM SERPL-SCNC: 141 MMOL/L — SIGNIFICANT CHANGE UP (ref 135–145)

## 2023-05-11 RX ORDER — SOTALOL HCL 120 MG
80 TABLET ORAL EVERY 24 HOURS
Refills: 0 | Status: DISCONTINUED | OUTPATIENT
Start: 2023-05-11 | End: 2023-05-11

## 2023-05-11 RX ORDER — METOPROLOL TARTRATE 50 MG
50 TABLET ORAL DAILY
Refills: 0 | Status: DISCONTINUED | OUTPATIENT
Start: 2023-05-11 | End: 2023-05-12

## 2023-05-11 RX ORDER — SOTALOL HCL 120 MG
40 TABLET ORAL EVERY 12 HOURS
Refills: 0 | Status: DISCONTINUED | OUTPATIENT
Start: 2023-05-11 | End: 2023-05-11

## 2023-05-11 RX ORDER — SOTALOL HCL 120 MG
80 TABLET ORAL EVERY 12 HOURS
Refills: 0 | Status: DISCONTINUED | OUTPATIENT
Start: 2023-05-11 | End: 2023-05-11

## 2023-05-11 RX ADMIN — VALSARTAN 320 MILLIGRAM(S): 80 TABLET ORAL at 05:42

## 2023-05-11 RX ADMIN — CHLORHEXIDINE GLUCONATE 1 APPLICATION(S): 213 SOLUTION TOPICAL at 05:41

## 2023-05-11 RX ADMIN — BUDESONIDE AND FORMOTEROL FUMARATE DIHYDRATE 2 PUFF(S): 160; 4.5 AEROSOL RESPIRATORY (INHALATION) at 18:03

## 2023-05-11 RX ADMIN — Medication 50 MILLIGRAM(S): at 18:02

## 2023-05-11 RX ADMIN — APIXABAN 5 MILLIGRAM(S): 2.5 TABLET, FILM COATED ORAL at 18:03

## 2023-05-11 RX ADMIN — AMLODIPINE BESYLATE 2.5 MILLIGRAM(S): 2.5 TABLET ORAL at 05:41

## 2023-05-11 RX ADMIN — BUDESONIDE AND FORMOTEROL FUMARATE DIHYDRATE 2 PUFF(S): 160; 4.5 AEROSOL RESPIRATORY (INHALATION) at 05:42

## 2023-05-11 RX ADMIN — Medication 1: at 08:11

## 2023-05-11 RX ADMIN — APIXABAN 5 MILLIGRAM(S): 2.5 TABLET, FILM COATED ORAL at 05:41

## 2023-05-11 RX ADMIN — ATORVASTATIN CALCIUM 40 MILLIGRAM(S): 80 TABLET, FILM COATED ORAL at 21:26

## 2023-05-11 NOTE — DISCHARGE NOTE PROVIDER - NSDCCPCAREPLAN_GEN_ALL_CORE_FT
PRINCIPAL DISCHARGE DIAGNOSIS  Diagnosis: Unspecified atrial fibrillation  Assessment and Plan of Treatment: Continue Eliquis 5 mg twice a day  Start metoprolol succinate 50 mg daily  Atrial fibrillation is the most common heart rhythm problem.  The condition puts you at risk for has stroke and heart attack  It helps if you control your blood pressure, not drink more than 1-2 alcohol drinks per day, cut down on caffeine, getting treatment for over active thyroid gland, and get regular exercise  Call your doctor if you feel your heart racing or beating unusually, chest tightness or pain, lightheaded, faint, shortness of breath especially with exercise  It is important to take your heart medication as prescribed  You may be on anticoagulation which is very important to take as directed - you may need blood work to monitor drug levels  Please follow-up with your primary care physician within one week of discharge.        SECONDARY DISCHARGE DIAGNOSES  Diagnosis: Emphysema/COPD  Assessment and Plan of Treatment: Call your Health Care provider upon arrival home to make a follow up appointment within one week.  Take all inhalers as prescribed by your Health Care Provider.  Take steroids as prescribed by your Health Care Provider.  If your cough increases infrequency and severity and/or you have shortness of breath or increased shortness of breath call your Health Care Provider.  If you develop fever, chills, night sweats, malaise, and/or change in mental status call your Health care Provider.  Nutrition is very important.  Eat small frequent meals.  Increase your activity as tolerated.  Do not stay in bed all day

## 2023-05-11 NOTE — DISCHARGE NOTE PROVIDER - NSDCMRMEDTOKEN_GEN_ALL_CORE_FT
acetaminophen 325 mg oral tablet: 2 tab(s) orally every 6 hours, As needed, Temp greater or equal to 38C (100.4F), Mild Pain (1 - 3)  amLODIPine 2.5 mg oral tablet: 1 orally once a day  Anoro Ellipta 62.5 mcg-25 mcg/inh inhalation powder: 1 puff(s) inhaled once a day  atorvastatin 40 mg oral tablet: 1 tab(s) orally once a day  benzonatate 100 mg oral capsule: 1 cap(s) orally 3 times a day  clopidogrel 75 mg oral tablet: 1 tab(s) orally once a day  Diovan 320 mg oral tablet: 1 tab(s) orally once a day  Eliquis 5 mg oral tablet: 1 tab(s) orally 2 times a day HOLD for 24 hrs, RESUME 4/5  glimepiride 2 mg oral tablet: 1 orally once a day  metFORMIN 500 mg oral tablet: 1 tab(s) orally 2 times a day HOLD for 48 hrs, RESUME 4/7  Metoprolol Succinate  mg oral tablet, extended release: 1 tab(s) orally once a day  propafenone 225 mg oral capsule, extended release: 1 cap(s) orally every 12 hours  Victoza 18 mg/3 mL subcutaneous solution: 12 milligram(s) subcutaneously once a day   acetaminophen 325 mg oral tablet: 2 tab(s) orally every 6 hours, As needed, Temp greater or equal to 38C (100.4F), Mild Pain (1 - 3)  amLODIPine 2.5 mg oral tablet: 1 orally once a day  Anoro Ellipta 62.5 mcg-25 mcg/inh inhalation powder: 1 puff(s) inhaled once a day  atorvastatin 40 mg oral tablet: 1 tab(s) orally once a day  benzonatate 100 mg oral capsule: 1 cap(s) orally 3 times a day  clopidogrel 75 mg oral tablet: 1 tab(s) orally once a day  Diovan 320 mg oral tablet: 1 tab(s) orally once a day  Eliquis 5 mg oral tablet: 1 tab(s) orally 2 times a day HOLD for 24 hrs, RESUME 4/5  glimepiride 2 mg oral tablet: 1 orally once a day  metFORMIN 500 mg oral tablet: 1 tab(s) orally 2 times a day HOLD for 48 hrs, RESUME 4/7  metoprolol succinate 50 mg oral tablet, extended release: 1 tab(s) orally once a day  Victoza 18 mg/3 mL subcutaneous solution: 12 milligram(s) subcutaneously once a day

## 2023-05-11 NOTE — DISCHARGE NOTE PROVIDER - CARE PROVIDER_API CALL
Misael Lemons)  Medicine  [address] [place], [place] [id number]  Phone: [phone]  Fax: [phone]  Established Patient  Follow Up Time: 1 week

## 2023-05-11 NOTE — DISCHARGE NOTE PROVIDER - ATTENDING DISCHARGE PHYSICAL EXAMINATION:
GENERAL: NAD, AAOx3  CHEST/LUNG: CTABL, No wheeze  HEART: No murmur  ABDOMEN: Soft, Nontender, Nondistended; Bowel sounds present  EXTREMITIES:  2+ Peripheral Pulses, No edema

## 2023-05-11 NOTE — DISCHARGE NOTE PROVIDER - NSDCFUADDAPPT_GEN_ALL_CORE_FT
APPTS ARE READY TO BE MADE: [ ] YES    Best Family or Patient Contact (if needed):    Additional Information about above appointments (if needed):    1:   2:   3:     Other comments or requests:    APPTS ARE READY TO BE MADE: [ x] YES    Best Family or Patient Contact (if needed):    Additional Information about above appointments (if needed):    1:   2:   3:     Other comments or requests:    APPTS ARE READY TO BE MADE: [ x] YES    Best Family or Patient Contact (if needed):    Additional Information about above appointments (if needed):    1:   2:   3:     Other comments or requests:     Patient was previously scheduled with Dr. Lemons

## 2023-05-11 NOTE — PROGRESS NOTE ADULT - ASSESSMENT
82 year old Female [employer] with PMHx of HTN, Afib on Eliquis, Tachy-Bryan syndrome s/p PPM, T2DM, CAD s/p recent NAVEEN x2 , p/w lightheadedness x 2 days.  - On Sotalol 80 QD. ? 40  BID - discussing with EPS  - Follow QTc during Sotalol load  - Remains in AF   - ? DCCV in future.   - appreciate EPS f/u   - appears fearful of PVI and will likely decline   - stable from CV standpoint. 
82 year old Female [employer] with PMHx of HTN, pAF on Eliquis and Propafenone, Tachy-Bryan syndrome/SND s/p PPM, T2DM, CAD s/p recent NAVEEN to Cx 4/4/23, presents for lightheadedness, generalized weakness, intermittent palpations. She has ongoing QUEZADA which is unchanged from her baseline. TTE this admisison revealing hyperdynamic LV function 70-75%, no valvular abnormalities, normal RV function. She received an alert from her Cardiology office that her remote interrogation revealed persistent AF since 5/9 for which EP is consulted.     Review of device interrogation revels patient's AT/AF burden 7.5% since 8/2022. Review of histograms reveal patient has been w/o AF since September 2022 until 5/9, and she has been persistently in AF since 5/9. Prior to that, she had high AF burden in July/August of 2022.      1) pAF, now persistent since 5/9  2) TBS/SND s/p dual chamber PPM     - Continue Eliquis 5mg bid. Patient endorses compliance w/ AC and states she has never missed a dose   - Patient is still refusing DCCV and procedures. She prefers medication management and wants to go home today  - D/c Propafenone, d/c Toprol   - D/C Sotalol and start Metoprolol Succinate 50mg qd  - replete lytes for K>4 and Mg >2, give STAT dose 2gm Mg now   - Will sign off, please recall as needed    #[phone]  
82 year old Female [employer] with PMHx of HTN, Afib on Eliquis, Tachy-Bryan syndrome s/p PPM, T2DM, CAD s/p recent NAVEEN x2 , p/w lightheadedness x 2 days.    # Postural dizziness with presyncope  # Afib  # CAD  # HTN  tele  appreciate EP recs, sotalol load  continue Eliquis   continue plavix   continue atorvastatin  continue Diovan   continue amlodipine    # Emphysema/COPD  on Anoro- ellipta , non formulary, can use own meds if available, otherwise will use Symbicort w/ PRN Duoneb    # Diabetes mellitus  Hold oral hypoglycemics and victoza while inpatient   insulin correction scale  check fsg qac/qhs  a1c 6.9    dvt ppx eliquis    Please call [facility] with questions [phone].    
82 year old Female [employer] with PMHx of HTN, Afib on Eliquis, Tachy-Bryan syndrome s/p PPM, T2DM, CAD s/p recent NAVEEN x2 , p/w lightheadedness x 2 days.    # Postural dizziness with presyncope  # Afib  # CAD  # HTN    may be 2/2 to afib   orthostatics  tele  cardio consult  EP consult  continue Eliquis   continue Propafenone   continue metoprolol   continue plavix   continue atorvastatin  continue Diovan   continue amlodipine    # Emphysema/COPD  on Anoro- ellipta , non formulary, can use own meds if available, otherwise will use Symbicort w/ PRN Duoneb    # Diabetes mellitus  Hold oral hypoglycemics and victoza while inpatient   insulin correction scale  check fsg qac/qhs  check a1c    dvt ppx eliquis    Please call [facility] with questions [phone].

## 2023-05-11 NOTE — DISCHARGE NOTE PROVIDER - HOSPITAL COURSE
82 year old Female [employer] with PMHx of HTN, Afib on Eliquis, Tachy-Bryan syndrome s/p PPM, T2DM, CAD s/p recent NAVEEN x2 , p/w lightheadedness x 2 days.    # Postural dizziness with presyncope  # Afib  # CAD  # HTN  tele  appreciate EP recs, sotalol load discontinued by EP. Metoprolol succinate 50 mg instead.  continue Eliquis   continue plavix   continue atorvastatin  continue Diovan   continue amlodipine    # Emphysema/COPD  on Anoro- ellipta , non formulary, can use own meds if available, otherwise will use Symbicort w/ PRN Duoneb    # Diabetes mellitus  Hold oral hypoglycemics and victoza while inpatient   insulin correction scale  check fsg qac/qhs  a1c 6.9    Discharge planning discussed with attending. Patient is medically cleared and stable for discharge. Medication Reconciliation reviewed with attending. Follow up outpatient with your PCP.

## 2023-05-11 NOTE — CHART NOTE - NSCHARTNOTEFT_GEN_A_CORE
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 She has ongoing QUEZADA which is unchanged from her baseline. TTE this admisison revealing hyperdynamic LV function 70-75%, no valvular abnormalities, normal RV function. She received an alert from her Cardiology office that her remote interrogation revealed persistent AF since 5/9 for which EP is consulted.     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 She has ongoing QUEZADA which is unchanged from her baseline. TTE this admisison revealing hyperdynamic LV function 70-75%, no valvular abnormalities, normal RV function. She received an alert from her Cardiology office that her remote interrogation revealed persistent AF since 5/9 for which EP is consulted.     EKG demonstrating Atrial Fibrillation at 88bpm, QTC 474ms  Okay to proceed with 5/11/2023 Sotalol dose AM 80mg qd in setting of CrCl  Endorsed to RN  Will endorse to AM team F/u on EKG s/p 2 hours of Sotalol administration    82 year old Female Mormonism with PMHx of HTN, pAF on Eliquis and Propafenone, Tachy-Bryan syndrome/SND s/p PPM, T2DM, CAD s/p recent NAVEEN to Cx 4/4/23, presents for lightheadedness, generalized weakness, intermittent palpations. She has ongoing QUEZADA which is unchanged from her baseline. TTE this admisison revealing hyperdynamic LV function 70-75%, no valvular abnormalities, normal RV function. She received an alert from her Cardiology office that her remote interrogation revealed persistent AF since 5/9 for which EP is consulted.     EKG demonstrating Atrial Fibrillation at 88bpm, QTC 474ms  Okay to proceed with 5/11/2023 Sotalol dose 80mg qd in setting of CrCl  Endorsed to RN  Will endorse to AM team

## 2023-05-12 ENCOUNTER — TRANSCRIPTION ENCOUNTER (OUTPATIENT)
Age: 83
End: 2023-05-12

## 2023-05-12 VITALS
OXYGEN SATURATION: 99 % | RESPIRATION RATE: 18 BRPM | DIASTOLIC BLOOD PRESSURE: 67 MMHG | HEART RATE: 88 BPM | SYSTOLIC BLOOD PRESSURE: 117 MMHG | TEMPERATURE: 98 F

## 2023-05-12 LAB
ANION GAP SERPL CALC-SCNC: 14 MMOL/L — SIGNIFICANT CHANGE UP (ref 5–17)
BUN SERPL-MCNC: 27 MG/DL — HIGH (ref 7–23)
CALCIUM SERPL-MCNC: 9.1 MG/DL — SIGNIFICANT CHANGE UP (ref 8.4–10.5)
CHLORIDE SERPL-SCNC: 106 MMOL/L — SIGNIFICANT CHANGE UP (ref 96–108)
CO2 SERPL-SCNC: 19 MMOL/L — LOW (ref 22–31)
CREAT SERPL-MCNC: 1.02 MG/DL — SIGNIFICANT CHANGE UP (ref 0.5–1.3)
EGFR: 55 ML/MIN/1.73M2 — LOW
GLUCOSE BLDC GLUCOMTR-MCNC: 176 MG/DL — HIGH (ref 70–99)
GLUCOSE BLDC GLUCOMTR-MCNC: 198 MG/DL — HIGH (ref 70–99)
GLUCOSE SERPL-MCNC: 162 MG/DL — HIGH (ref 70–99)
HCT VFR BLD CALC: 33.5 % — LOW (ref 34.5–45)
HGB BLD-MCNC: 11.8 G/DL — SIGNIFICANT CHANGE UP (ref 11.5–15.5)
MCHC RBC-ENTMCNC: 29.8 PG — SIGNIFICANT CHANGE UP (ref 27–34)
MCHC RBC-ENTMCNC: 35.2 GM/DL — SIGNIFICANT CHANGE UP (ref 32–36)
MCV RBC AUTO: 84.6 FL — SIGNIFICANT CHANGE UP (ref 80–100)
NRBC # BLD: 0 /100 WBCS — SIGNIFICANT CHANGE UP (ref 0–0)
PLATELET # BLD AUTO: 195 K/UL — SIGNIFICANT CHANGE UP (ref 150–400)
POTASSIUM SERPL-MCNC: 3.9 MMOL/L — SIGNIFICANT CHANGE UP (ref 3.5–5.3)
POTASSIUM SERPL-SCNC: 3.9 MMOL/L — SIGNIFICANT CHANGE UP (ref 3.5–5.3)
RBC # BLD: 3.96 M/UL — SIGNIFICANT CHANGE UP (ref 3.8–5.2)
RBC # FLD: 13 % — SIGNIFICANT CHANGE UP (ref 10.3–14.5)
SODIUM SERPL-SCNC: 139 MMOL/L — SIGNIFICANT CHANGE UP (ref 135–145)
WBC # BLD: 4.31 K/UL — SIGNIFICANT CHANGE UP (ref 3.8–10.5)
WBC # FLD AUTO: 4.31 K/UL — SIGNIFICANT CHANGE UP (ref 3.8–10.5)

## 2023-05-12 PROCEDURE — 99285 EMERGENCY DEPT VISIT HI MDM: CPT

## 2023-05-12 PROCEDURE — 71045 X-RAY EXAM CHEST 1 VIEW: CPT

## 2023-05-12 PROCEDURE — 85025 COMPLETE CBC W/AUTO DIFF WBC: CPT

## 2023-05-12 PROCEDURE — 82947 ASSAY GLUCOSE BLOOD QUANT: CPT

## 2023-05-12 PROCEDURE — 85027 COMPLETE CBC AUTOMATED: CPT

## 2023-05-12 PROCEDURE — 83605 ASSAY OF LACTIC ACID: CPT

## 2023-05-12 PROCEDURE — 94640 AIRWAY INHALATION TREATMENT: CPT

## 2023-05-12 PROCEDURE — 84484 ASSAY OF TROPONIN QUANT: CPT

## 2023-05-12 PROCEDURE — 83880 ASSAY OF NATRIURETIC PEPTIDE: CPT

## 2023-05-12 PROCEDURE — 85014 HEMATOCRIT: CPT

## 2023-05-12 PROCEDURE — 87640 STAPH A DNA AMP PROBE: CPT

## 2023-05-12 PROCEDURE — 0225U NFCT DS DNA&RNA 21 SARSCOV2: CPT

## 2023-05-12 PROCEDURE — 82330 ASSAY OF CALCIUM: CPT

## 2023-05-12 PROCEDURE — 93306 TTE W/DOPPLER COMPLETE: CPT

## 2023-05-12 PROCEDURE — 83036 HEMOGLOBIN GLYCOSYLATED A1C: CPT

## 2023-05-12 PROCEDURE — 85018 HEMOGLOBIN: CPT

## 2023-05-12 PROCEDURE — 84132 ASSAY OF SERUM POTASSIUM: CPT

## 2023-05-12 PROCEDURE — 93005 ELECTROCARDIOGRAM TRACING: CPT

## 2023-05-12 PROCEDURE — 84295 ASSAY OF SERUM SODIUM: CPT

## 2023-05-12 PROCEDURE — 80048 BASIC METABOLIC PNL TOTAL CA: CPT

## 2023-05-12 PROCEDURE — 36415 COLL VENOUS BLD VENIPUNCTURE: CPT

## 2023-05-12 PROCEDURE — 83735 ASSAY OF MAGNESIUM: CPT

## 2023-05-12 PROCEDURE — 80053 COMPREHEN METABOLIC PANEL: CPT

## 2023-05-12 PROCEDURE — 82435 ASSAY OF BLOOD CHLORIDE: CPT

## 2023-05-12 PROCEDURE — 96374 THER/PROPH/DIAG INJ IV PUSH: CPT

## 2023-05-12 PROCEDURE — 87641 MR-STAPH DNA AMP PROBE: CPT

## 2023-05-12 PROCEDURE — 82803 BLOOD GASES ANY COMBINATION: CPT

## 2023-05-12 PROCEDURE — 82962 GLUCOSE BLOOD TEST: CPT

## 2023-05-12 RX ORDER — METOPROLOL TARTRATE 50 MG
1 TABLET ORAL
Qty: 30 | Refills: 0
Start: 2023-05-12 | End: 2023-06-10

## 2023-05-12 RX ORDER — METOPROLOL TARTRATE 50 MG
1 TABLET ORAL
Qty: 0 | Refills: 0 | DISCHARGE

## 2023-05-12 RX ORDER — PROPAFENONE HCL 150 MG
1 TABLET ORAL
Qty: 0 | Refills: 0 | DISCHARGE

## 2023-05-12 RX ADMIN — VALSARTAN 320 MILLIGRAM(S): 80 TABLET ORAL at 05:29

## 2023-05-12 RX ADMIN — AMLODIPINE BESYLATE 2.5 MILLIGRAM(S): 2.5 TABLET ORAL at 05:30

## 2023-05-12 RX ADMIN — Medication 1: at 08:07

## 2023-05-12 RX ADMIN — Medication 50 MILLIGRAM(S): at 05:30

## 2023-05-12 RX ADMIN — Medication 1: at 12:27

## 2023-05-12 RX ADMIN — BUDESONIDE AND FORMOTEROL FUMARATE DIHYDRATE 2 PUFF(S): 160; 4.5 AEROSOL RESPIRATORY (INHALATION) at 05:29

## 2023-05-12 RX ADMIN — CLOPIDOGREL BISULFATE 75 MILLIGRAM(S): 75 TABLET, FILM COATED ORAL at 12:26

## 2023-05-12 RX ADMIN — APIXABAN 5 MILLIGRAM(S): 2.5 TABLET, FILM COATED ORAL at 05:30

## 2023-05-12 RX ADMIN — CHLORHEXIDINE GLUCONATE 1 APPLICATION(S): 213 SOLUTION TOPICAL at 05:31

## 2023-05-12 NOTE — DISCHARGE NOTE NURSING/CASE MANAGEMENT/SOCIAL WORK - NSDCPEFALRISK_GEN_ALL_CORE
For information on Fall & Injury Prevention, visit: [email or web address] OR  [email or web address] OR  https://www.cdc.gov/steadi/patient.html

## 2023-05-12 NOTE — PROGRESS NOTE ADULT - REASON FOR ADMISSION
lightheadedness x 2 days

## 2023-05-12 NOTE — PROGRESS NOTE ADULT - SUBJECTIVE AND OBJECTIVE BOX
24H hour events:   Seen ambulating in room, no c/o at this time except for mild palpitations last night; anxious to go home today    MEDICATIONS:  amLODIPine   Tablet 2.5 milliGRAM(s) Oral daily  apixaban 5 milliGRAM(s) Oral every 12 hours  clopidogrel Tablet 75 milliGRAM(s) Oral daily  sotalol 40 milliGRAM(s) Oral every 12 hours  valsartan 320 milliGRAM(s) Oral daily  benzonatate 100 milliGRAM(s) Oral three times a day PRN  budesonide  80 MICROgram(s)/formoterol 4.5 MICROgram(s) Inhaler 2 Puff(s) Inhalation two times a day  acetaminophen     Tablet .. 650 milliGRAM(s) Oral every 6 hours PRN  melatonin 3 milliGRAM(s) Oral at bedtime PRN  ondansetron Injectable 4 milliGRAM(s) IV Push every 8 hours PRN  aluminum hydroxide/magnesium hydroxide/simethicone Suspension 30 milliLiter(s) Oral every 4 hours PRN  atorvastatin 40 milliGRAM(s) Oral at bedtime  dextrose 50% Injectable 12.5 Gram(s) IV Push once  dextrose 50% Injectable 25 Gram(s) IV Push once  dextrose Oral Gel 15 Gram(s) Oral once PRN  glucagon  Injectable 1 milliGRAM(s) IntraMuscular once  insulin lispro (ADMELOG) corrective regimen sliding scale   SubCutaneous at bedtime  insulin lispro (ADMELOG) corrective regimen sliding scale   SubCutaneous three times a day before meals  chlorhexidine 2% Cloths 1 Application(s) Topical <User Schedule>  dextrose 5%. 1000 milliLiter(s) IV Continuous <Continuous>  dextrose 5%. 1000 milliLiter(s) IV Continuous <Continuous>      REVIEW OF SYSTEMS:  Complete 12point ROS negative.    PHYSICAL EXAM:  T(C): 36.4 (05-11-23 @ 04:31), Max: 36.4 (05-11-23 @ 04:31)  HR: 71 (05-11-23 @ 04:31) (71 - 94)  BP: 147/81 (05-11-23 @ 04:31) (111/79 - 158/72)  RR: 18 (05-11-23 @ 04:31) (18 - 18)  SpO2: 97% (05-11-23 @ 04:31) (96% - 97%)  Wt(kg): --  I&O's Summary    10 May 2023 07:01  -  11 May 2023 07:00  --------------------------------------------------------  IN: 480 mL / OUT: 0 mL / NET: 480 mL    11 May 2023 07:01  -  11 May 2023 11:03  --------------------------------------------------------  IN: 240 mL / OUT: 0 mL / NET: 240 mL        Appearance: Normal, in NAD  Head: normocephalic, atraumatic  Cardiovascular: Normal S1 S2, no m/r/g  Respiratory: Lungs clear to auscultation	  Psychiatry: A & O x 3, Mood & affect appropriate  Gastrointestinal:  Soft, Non-tender, + BS  : voiding	  Skin: No rashes, No ecchymose	  Neurologic: Non-focal  Extremities: Normal range of motion, no c/c/e  Vascular: Peripheral pulses palpable 2+ bilaterally        LABS:	 	    CBC Full  -  ( 10 May 2023 06:27 )  WBC Count : 4.43 K/uL  Hemoglobin : 11.7 g/dL  Hematocrit : 33.4 %  Platelet Count - Automated : 186 K/uL  Mean Cell Volume : 85.9 fl  Mean Cell Hemoglobin : 30.1 pg  Mean Cell Hemoglobin Concentration : 35.0 gm/dL  Auto Neutrophil # : x  Auto Lymphocyte # : x  Auto Monocyte # : x  Auto Eosinophil # : x  Auto Basophil # : x  Auto Neutrophil % : x  Auto Lymphocyte % : x  Auto Monocyte % : x  Auto Eosinophil % : x  Auto Basophil % : x    05-11    141  |  107  |  22  ----------------------------<  153<H>  3.8   |  21<L>  |  1.07  05-10    139  |  108  |  24<H>  ----------------------------<  154<H>  4.2   |  20<L>  |  0.99    Ca    9.2      11 May 2023 06:41  Ca    8.9      10 May 2023 06:31  Mg     1.8     05-10  Mg     1.6     05-09    TPro  5.7<L>  /  Alb  3.6  /  TBili  0.5  /  DBili  x   /  AST  15  /  ALT  17  /  AlkPhos  76  05-10  TPro  6.9  /  Alb  4.4  /  TBili  0.8  /  DBili  x   /  AST  32  /  ALT  23  /  AlkPhos  89  05-09      proBNP:   Lipid Profile:   HgA1c:   TSH:       CARDIAC MARKERS:      TELEMETRY: AF/Vpaced 70-80s, PVCs      Echo 5/10/23:    CONCLUSIONS:      1. Normal left ventricular cavity size. The left ventricular wall thickness is normal. The left ventricular systolic function is normal with an ejection fraction visually estimated at 70 to 75 %. There are no regional wall motion abnormalities seen.   2. Normal right ventricular cavity size and normal systolic function.  ________________________________________________________________________________________  FINDINGS:  Left Ventricle:  Normal left ventricular cavity size. The left ventricular wall thickness is normal. The left ventricular systolic function is normal with an ejection fraction visually estimated at 70 to 75%. There are no regional wall motion abnormalities seen. The left ventricular diastolic function is indeterminate, with normal filling pressure.     Right Ventricle:  Normal right ventricular cavity size and normal systolic function. Tricuspid annular plane systolic excursion (TAPSE) is 1.8 cm(normal >=1.7 cm). A device lead is visualized.     Left Atrium:  The left atrium is normal, with an indexed volume of 22 ml/m².     Right Atrium:  The right atrium is normal. There is a pacer lead seen in the right atrium.     Aortic Valve:  The aortic valve is tricuspid with normal structure without stenosis. There is mild aortic regurgitation.     Mitral Valve:  Structurally normal mitral valve with normal leaflet excursion. There is no mitral valve stenosis. There is trace mitral regurgitation.     Tricuspid Valve:  Structurally normal tricuspid valve with normal leaflet excursion. There is mild tricuspid regurgitation. Estimated pulmonary artery systolic pressure is 30 mmHg.     Pulmonic Valve:  Structurally normal pulmonic valve with normal leaflet excursion. There is trace pulmonic regurgitation.    Aorta:  The aortic annulus and aortic root appear normal in size. Aortic root at sinuses of Valsalva is normal measuring 2.50 cm (indexed 1.43 cm/m²).     Pericardium:  No pericardial effusion seen.     Systemic Veins:  The inferior vena cava is normal measuring 1.40 cm in diameter, (normal <2.1cm) with normal inspiratory collapse (normal >50%) consistent with normal right atrial pressure (~3, range 0-5mmHg).  
Patient is a 82y old  Female who presents with a chief complaint of lightheadedness x 2 days (10 May 2023 11:39)      SUBJECTIVE / OVERNIGHT EVENTS:    Patient seen and examined. no cp sob. does not want procedures.       Vital Signs Last 24 Hrs  T(C): 37 (10 May 2023 10:58), Max: 37 (10 May 2023 10:58)  T(F): 98.6 (10 May 2023 10:58), Max: 98.6 (10 May 2023 10:58)  HR: 80 (10 May 2023 10:58) (80 - 101)  BP: 137/82 (10 May 2023 10:58) (105/64 - 138/74)  BP(mean): --  RR: 18 (10 May 2023 10:58) (16 - 18)  SpO2: 96% (10 May 2023 10:58) (96% - 99%)    Parameters below as of 10 May 2023 10:58  Patient On (Oxygen Delivery Method): room air      I&O's Summary    09 May 2023 07:01  -  10 May 2023 07:00  --------------------------------------------------------  IN: 0 mL / OUT: 1 mL / NET: -1 mL        PE:  GENERAL: NAD, AAOx3  CHEST/LUNG: CTABL, No wheeze  HEART: irregular irregular, no murmur  ABDOMEN: Soft, Nontender, Nondistended; Bowel sounds present  EXTREMITIES:  2+ Peripheral Pulses, No clubbing, cyanosis, or edema  NEURO: No focal deficits    LABS:                        11.7   4.43  )-----------( 186      ( 10 May 2023 06:27 )             33.4     05-10    139  |  108  |  24<H>  ----------------------------<  154<H>  4.2   |  20<L>  |  0.99    Ca    8.9      10 May 2023 06:31  Mg     1.8     05-10    TPro  5.7<L>  /  Alb  3.6  /  TBili  0.5  /  DBili  x   /  AST  15  /  ALT  17  /  AlkPhos  76  05-10      CAPILLARY BLOOD GLUCOSE      POCT Blood Glucose.: 223 mg/dL (10 May 2023 11:39)  POCT Blood Glucose.: 142 mg/dL (10 May 2023 07:47)  POCT Blood Glucose.: 161 mg/dL (09 May 2023 21:38)            RADIOLOGY & ADDITIONAL TESTS:    Imaging Personally Reviewed:  [x] YES  [ ] NO    Consultant(s) Notes Reviewed:  [x] YES  [ ] NO    MEDICATIONS  (STANDING):  amLODIPine   Tablet 2.5 milliGRAM(s) Oral daily  apixaban 5 milliGRAM(s) Oral every 12 hours  atorvastatin 40 milliGRAM(s) Oral at bedtime  budesonide  80 MICROgram(s)/formoterol 4.5 MICROgram(s) Inhaler 2 Puff(s) Inhalation two times a day  chlorhexidine 2% Cloths 1 Application(s) Topical <User Schedule>  clopidogrel Tablet 75 milliGRAM(s) Oral daily  dextrose 5%. 1000 milliLiter(s) (50 mL/Hr) IV Continuous <Continuous>  dextrose 5%. 1000 milliLiter(s) (100 mL/Hr) IV Continuous <Continuous>  dextrose 50% Injectable 25 Gram(s) IV Push once  dextrose 50% Injectable 12.5 Gram(s) IV Push once  glucagon  Injectable 1 milliGRAM(s) IntraMuscular once  insulin lispro (ADMELOG) corrective regimen sliding scale   SubCutaneous three times a day before meals  insulin lispro (ADMELOG) corrective regimen sliding scale   SubCutaneous at bedtime  metoprolol succinate  milliGRAM(s) Oral daily  propafenone  milliGRAM(s) Oral every 12 hours  valsartan 320 milliGRAM(s) Oral daily    MEDICATIONS  (PRN):  acetaminophen     Tablet .. 650 milliGRAM(s) Oral every 6 hours PRN Temp greater or equal to 38C (100.4F), Mild Pain (1 - 3)  aluminum hydroxide/magnesium hydroxide/simethicone Suspension 30 milliLiter(s) Oral every 4 hours PRN Dyspepsia  benzonatate 100 milliGRAM(s) Oral three times a day PRN Cough  dextrose Oral Gel 15 Gram(s) Oral once PRN Blood Glucose LESS THAN 70 milliGRAM(s)/deciliter  melatonin 3 milliGRAM(s) Oral at bedtime PRN Insomnia  ondansetron Injectable 4 milliGRAM(s) IV Push every 8 hours PRN Nausea and/or Vomiting      Care Discussed with Consultants/Other Providers [x] YES  [ ] NO    HEALTH ISSUES - PROBLEM Dx:  Postural dizziness with presyncope    Unspecified atrial fibrillation    CAD (coronary artery disease)    HTN (hypertension)    Emphysema/COPD    Diabetes mellitus        
Patient is a 82y old  Female who presents with a chief complaint of lightheadedness x 2 days (11 May 2023 08:50)      SUBJECTIVE / OVERNIGHT EVENTS:    Patient seen and examined. no complaints. tele afib 70-80s      Vital Signs Last 24 Hrs  T(C): 36.4 (11 May 2023 04:31), Max: 36.4 (11 May 2023 04:31)  T(F): 97.5 (11 May 2023 04:31), Max: 97.5 (11 May 2023 04:31)  HR: 71 (11 May 2023 04:31) (71 - 94)  BP: 147/81 (11 May 2023 04:31) (111/79 - 158/72)  BP(mean): --  RR: 18 (11 May 2023 04:31) (18 - 18)  SpO2: 97% (11 May 2023 04:31) (96% - 97%)    Parameters below as of 11 May 2023 04:31  Patient On (Oxygen Delivery Method): room air      I&O's Summary    10 May 2023 07:01  -  11 May 2023 07:00  --------------------------------------------------------  IN: 480 mL / OUT: 0 mL / NET: 480 mL    11 May 2023 07:01  -  11 May 2023 11:54  --------------------------------------------------------  IN: 240 mL / OUT: 0 mL / NET: 240 mL        PE:  GENERAL: NAD, AAOx3  CHEST/LUNG: CTABL, No wheeze  HEART: irregular irregular; no murmur  ABDOMEN: Soft, Nontender, Nondistended; Bowel sounds present  EXTREMITIES:  2+ Peripheral Pulses, No  edema  NEURO: No focal deficits    LABS:                        11.7   4.43  )-----------( 186      ( 10 May 2023 06:27 )             33.4     05-11    141  |  107  |  22  ----------------------------<  153<H>  3.8   |  21<L>  |  1.07    Ca    9.2      11 May 2023 06:41  Mg     1.8     05-10    TPro  5.7<L>  /  Alb  3.6  /  TBili  0.5  /  DBili  x   /  AST  15  /  ALT  17  /  AlkPhos  76  05-10      CAPILLARY BLOOD GLUCOSE      POCT Blood Glucose.: 137 mg/dL (11 May 2023 11:35)  POCT Blood Glucose.: 157 mg/dL (11 May 2023 07:48)  POCT Blood Glucose.: 151 mg/dL (10 May 2023 21:25)  POCT Blood Glucose.: 114 mg/dL (10 May 2023 17:22)            RADIOLOGY & ADDITIONAL TESTS:    Imaging Personally Reviewed:  [x] YES  [ ] NO    Consultant(s) Notes Reviewed:  [x] YES  [ ] NO    MEDICATIONS  (STANDING):  amLODIPine   Tablet 2.5 milliGRAM(s) Oral daily  apixaban 5 milliGRAM(s) Oral every 12 hours  atorvastatin 40 milliGRAM(s) Oral at bedtime  budesonide  80 MICROgram(s)/formoterol 4.5 MICROgram(s) Inhaler 2 Puff(s) Inhalation two times a day  chlorhexidine 2% Cloths 1 Application(s) Topical <User Schedule>  clopidogrel Tablet 75 milliGRAM(s) Oral daily  dextrose 5%. 1000 milliLiter(s) (50 mL/Hr) IV Continuous <Continuous>  dextrose 5%. 1000 milliLiter(s) (100 mL/Hr) IV Continuous <Continuous>  dextrose 50% Injectable 25 Gram(s) IV Push once  dextrose 50% Injectable 12.5 Gram(s) IV Push once  glucagon  Injectable 1 milliGRAM(s) IntraMuscular once  insulin lispro (ADMELOG) corrective regimen sliding scale   SubCutaneous at bedtime  insulin lispro (ADMELOG) corrective regimen sliding scale   SubCutaneous three times a day before meals  sotalol 40 milliGRAM(s) Oral every 12 hours  valsartan 320 milliGRAM(s) Oral daily    MEDICATIONS  (PRN):  acetaminophen     Tablet .. 650 milliGRAM(s) Oral every 6 hours PRN Temp greater or equal to 38C (100.4F), Mild Pain (1 - 3)  aluminum hydroxide/magnesium hydroxide/simethicone Suspension 30 milliLiter(s) Oral every 4 hours PRN Dyspepsia  benzonatate 100 milliGRAM(s) Oral three times a day PRN Cough  dextrose Oral Gel 15 Gram(s) Oral once PRN Blood Glucose LESS THAN 70 milliGRAM(s)/deciliter  melatonin 3 milliGRAM(s) Oral at bedtime PRN Insomnia  ondansetron Injectable 4 milliGRAM(s) IV Push every 8 hours PRN Nausea and/or Vomiting      Care Discussed with Consultants/Other Providers [x] YES  [ ] NO    HEALTH ISSUES - PROBLEM Dx:  Postural dizziness with presyncope    Unspecified atrial fibrillation    CAD (coronary artery disease)    HTN (hypertension)    Emphysema/COPD    Diabetes mellitus        
Patient is refusing GERARDO DCCV  Telem - AF  with demand V pacing  /85 HR 77  Lungs clear  Irregular rhythm  No edema    WBC 4.31  Hgb 11.8  Hct 33.5  Plt 195K  BUN 27  Crt 1.02    Echo - EF 70-75%  mild AI trace MR    Imp:  Persistent AF with demand vent pacing - refusing GERARDO DCCV  On Metoprolol and off Sotalol and Propafenone as per EP  Rec:  Continue Eliquis   DC to home    
SUBJECTIVE:  No CP no SOB No palpitations   	  MEDICATIONS:  amLODIPine   Tablet 2.5 milliGRAM(s) Oral daily  sotalol. 80 milliGRAM(s) Oral every 24 hours  valsartan 320 milliGRAM(s) Oral daily      benzonatate 100 milliGRAM(s) Oral three times a day PRN  budesonide  80 MICROgram(s)/formoterol 4.5 MICROgram(s) Inhaler 2 Puff(s) Inhalation two times a day    acetaminophen     Tablet .. 650 milliGRAM(s) Oral every 6 hours PRN  melatonin 3 milliGRAM(s) Oral at bedtime PRN  ondansetron Injectable 4 milliGRAM(s) IV Push every 8 hours PRN    aluminum hydroxide/magnesium hydroxide/simethicone Suspension 30 milliLiter(s) Oral every 4 hours PRN    atorvastatin 40 milliGRAM(s) Oral at bedtime  dextrose 50% Injectable 12.5 Gram(s) IV Push once  dextrose 50% Injectable 25 Gram(s) IV Push once  dextrose Oral Gel 15 Gram(s) Oral once PRN  glucagon  Injectable 1 milliGRAM(s) IntraMuscular once  insulin lispro (ADMELOG) corrective regimen sliding scale   SubCutaneous at bedtime  insulin lispro (ADMELOG) corrective regimen sliding scale   SubCutaneous three times a day before meals    apixaban 5 milliGRAM(s) Oral every 12 hours  chlorhexidine 2% Cloths 1 Application(s) Topical <User Schedule>  clopidogrel Tablet 75 milliGRAM(s) Oral daily  dextrose 5%. 1000 milliLiter(s) IV Continuous <Continuous>  dextrose 5%. 1000 milliLiter(s) IV Continuous <Continuous>      REVIEW OF SYSTEMS:    CONSTITUTIONAL: No fever, weight loss, or fatigue  EYES: No eye pain, visual disturbances, or discharge  NECK: No pain or stiffness  RESPIRATORY: No cough, wheezing, chills or hemoptysis; No Shortness of Breath  CARDIOVASCULAR: No chest pain, palpitations, dizziness, or leg swelling  GASTROINTESTINAL: No abdominal or epigastric pain. No nausea, vomiting, or hematemesis; No diarrhea or constipation. No melena or hematochezia.  GENITOURINARY: No dysuria, frequency, hematuria, or incontinence  NEUROLOGICAL: No headaches, memory loss, loss of strength, numbness, or tremors  SKIN: No itching, burning, rashes, or lesions   LYMPH Nodes: No enlarged glands  MUSCULOSKELETAL: No joint pain or swelling; No muscle, back, or extremity pain  All other review of systems are negative.  	  [ ] Unable to obtain    PHYSICAL EXAM:  T(C): 36.4 (05-11-23 @ 04:31), Max: 37 (05-10-23 @ 10:58)  HR: 71 (05-11-23 @ 04:31) (71 - 94)  BP: 147/81 (05-11-23 @ 04:31) (111/79 - 158/72)  RR: 18 (05-11-23 @ 04:31) (18 - 18)  SpO2: 97% (05-11-23 @ 04:31) (96% - 97%)  Wt(kg): --  I&O's Summary    10 May 2023 07:01  -  11 May 2023 07:00  --------------------------------------------------------  IN: 480 mL / OUT: 0 mL / NET: 480 mL          PHYSICAL EXAM    Appearance: Normal	  HEENT:   Normal oral mucosa, PERRL, EOMI	  NECK: Soft and supple, No LAD, No JVD  Cardiovascular: Regular Rate and Rhythm, Normal S1 S2, No murmurs, No clicks, gallops or rubs  Respiratory: Lungs clear to auscultation	  Gastrointestinal:  Soft, Non-tender, + BS	  Skin: No rashes, No ecchymoses, No cyanosis  Neurologic: Non-focal  Extremities: No clubbing, cyanosis or edema  Vascular: Peripheral pulses palpable 2+ bilaterally    TELEMETRY: 	    ECG:  	  RADIOLOGY  DIAGNOSTIC TESTING:  [ ] Echocardiogram:  [ ] Catheterization:  [ ] Stress Test:    OTHER: 	    LABS:	 	    CARDIAC MARKERS:                                  11.7   4.43  )-----------( 186      ( 10 May 2023 06:27 )             33.4     05-11    141  |  107  |  22  ----------------------------<  153<H>  3.8   |  21<L>  |  1.07    Ca    9.2      11 May 2023 06:41  Mg     1.8     05-10    TPro  5.7<L>  /  Alb  3.6  /  TBili  0.5  /  DBili  x   /  AST  15  /  ALT  17  /  AlkPhos  76  05-10

## 2023-05-12 NOTE — DISCHARGE NOTE NURSING/CASE MANAGEMENT/SOCIAL WORK - PATIENT PORTAL LINK FT
You can access the FollowMyHealth Patient Portal offered by [facility] by registering at the following website: [email or web address]. By joining [email or web address]’s FollowMyHealth portal, you will also be able to view your health information using other applications (apps) compatible with our system.

## 2023-05-12 NOTE — DISCHARGE NOTE NURSING/CASE MANAGEMENT/SOCIAL WORK - NSDCVIVACCINE_GEN_ALL_CORE_FT
Tdap; 29-Oct-2022 13:54; Herbert Aguilar (NP); [employer]; [id number] (Exp. Date: 08-Dec-2024); IntraMuscular; Deltoid Left.; 0.5 milliLiter(s); VIS (VIS Published: 09-May-2013, VIS Presented: 29-Oct-2022);

## 2023-05-12 NOTE — DISCHARGE NOTE NURSING/CASE MANAGEMENT/SOCIAL WORK - NSDCPEELIQUISREACT_GEN_ALL_CORE
Dx: chronic neck pain, OA         Insurance (Authorized # of Visits):  Medicare, 8           Authorizing Physician: Dr. Aleta Ford  Next MD visit: none scheduled  Fall Risk: standard         Precautions: n/a           Subjective: she notes feeling a little bre
Apixaban/Eliquis increases your risk for bleeding. Notify your doctor if you experience any of the following side effects: bleeding, coughing or vomiting blood, red or black stool, unexpected pain or swelling, itching or hives, chest pain, chest tightness, trouble breathing, changes in how much or how often you urinate, red or pink urine, numbness or tingling in your feet, or unusual muscle weakness. When Apixaban/Eliquis is taken with other medicines, they can affect how it works. Taking other medications such as aspirin, blood thinners, nonsteroidal anti-inflammatories, and medications that treat depression can increase your risk of bleeding. It is very important to tell your health care provider about all of the other medicines, including over-the-counter medications, herbs, and vitamins you are taking. DO NOT start, stop, or change the dosage of any medicine, including over-the-counter medicines, vitamins, and herbal products without your doctor’s approval. Any products containing aspirin or are nonsteroidal anti-inflammatories lessen the blood’s ability to form clots and add to the effect of Apixaban/Eliquis. Never take aspirin or medicines that contain aspirin without speaking to your doctor.

## 2023-05-13 ENCOUNTER — TRANSCRIPTION ENCOUNTER (OUTPATIENT)
Age: 83
End: 2023-05-13

## 2023-05-16 ENCOUNTER — NON-APPOINTMENT (OUTPATIENT)
Age: 83
End: 2023-05-16

## 2023-05-16 ENCOUNTER — APPOINTMENT (OUTPATIENT)
Dept: CARDIOLOGY | Facility: CLINIC | Age: 83
End: 2023-05-16

## 2023-05-16 ENCOUNTER — APPOINTMENT (OUTPATIENT)
Dept: CARDIOLOGY | Facility: CLINIC | Age: 83
End: 2023-05-16
Payer: MEDICARE

## 2023-05-16 VITALS
DIASTOLIC BLOOD PRESSURE: 72 MMHG | HEART RATE: 102 BPM | HEIGHT: 66 IN | WEIGHT: 145 LBS | RESPIRATION RATE: 16 BRPM | OXYGEN SATURATION: 99 % | TEMPERATURE: 97.9 F | SYSTOLIC BLOOD PRESSURE: 142 MMHG | BODY MASS INDEX: 23.3 KG/M2

## 2023-05-16 DIAGNOSIS — Z86.73 PERSONAL HISTORY OF TRANSIENT ISCHEMIC ATTACK (TIA), AND CEREBRAL INFARCTION W/OUT RESIDUAL DEFICITS: ICD-10-CM

## 2023-05-16 DIAGNOSIS — Z82.49 FAMILY HISTORY OF ISCHEMIC HEART DISEASE AND OTHER DISEASES OF THE CIRCULATORY SYSTEM: ICD-10-CM

## 2023-05-16 DIAGNOSIS — Z83.3 FAMILY HISTORY OF DIABETES MELLITUS: ICD-10-CM

## 2023-05-16 PROCEDURE — 99204 OFFICE O/P NEW MOD 45 MIN: CPT

## 2023-05-16 PROCEDURE — 93000 ELECTROCARDIOGRAM COMPLETE: CPT

## 2023-05-16 RX ORDER — LIRAGLUTIDE 6 MG/ML
18 INJECTION SUBCUTANEOUS DAILY
Refills: 0 | Status: ACTIVE | COMMUNITY
Start: 2023-05-16

## 2023-05-16 RX ORDER — GLIMEPIRIDE 2 MG/1
2 TABLET ORAL DAILY
Refills: 0 | Status: ACTIVE | COMMUNITY
Start: 2023-05-16

## 2023-05-16 RX ORDER — PROPAFENONE HYDROCHLORIDE 225 MG/1
225 CAPSULE, EXTENDED RELEASE ORAL
Refills: 1 | Status: DISCONTINUED | COMMUNITY
End: 2023-05-16

## 2023-05-16 RX ORDER — METFORMIN HYDROCHLORIDE 500 MG/1
500 TABLET, COATED ORAL
Refills: 0 | Status: ACTIVE | COMMUNITY
Start: 2023-05-16

## 2023-05-16 NOTE — HISTORY OF PRESENT ILLNESS
[FreeTextEntry1] : Loc is a 82 y.o female w/MHx of CAD s/p PCI 4/3/2023, TIA, Afib, PPM, HLD, HTN, DM, Emphysema who presents to establish cardiac care. PCP Dr. James Hdez. Pt. notes having palpitations. Was recently in afib, alerted by remote interrogation of PPM. Was restarted on Eliquis. 5/9/2023 Did present to [facility] for dizziness. Was offered DCCV, however pt. refused, was treated w/ antiarrhythmics, discharged w/ Metoprolol Succinate 50 mg daily. Prior to hospitalization pt. was on propafenone 225 mg BID, Metoprolol 150 mg daily. Denies chest pain, diaphoresis, vision changes, HA, dizziness, syncope, cough, wheezing, SOB/QUEZADA, edema, fatigue, fever, chills, infection.

## 2023-05-17 ENCOUNTER — TRANSCRIPTION ENCOUNTER (OUTPATIENT)
Age: 83
End: 2023-05-17

## 2023-05-20 LAB
ALBUMIN SERPL ELPH-MCNC: 4.4 G/DL
ALP BLD-CCNC: 81 U/L
ALT SERPL-CCNC: 16 U/L
ANION GAP SERPL CALC-SCNC: 20 MMOL/L
AST SERPL-CCNC: 16 U/L
BILIRUB DIRECT SERPL-MCNC: 0.2 MG/DL
BILIRUB INDIRECT SERPL-MCNC: 0.3 MG/DL
BILIRUB SERPL-MCNC: 0.5 MG/DL
BUN SERPL-MCNC: 15 MG/DL
CALCIUM SERPL-MCNC: 10.1 MG/DL
CHLORIDE SERPL-SCNC: 108 MMOL/L
CHOLEST SERPL-MCNC: 145 MG/DL
CK SERPL-CCNC: 83 U/L
CO2 SERPL-SCNC: 16 MMOL/L
CREAT SERPL-MCNC: 0.92 MG/DL
EGFR: 63 ML/MIN/1.73M2
ESTIMATED AVERAGE GLUCOSE: 157 MG/DL
GLUCOSE SERPL-MCNC: 142 MG/DL
HBA1C MFR BLD HPLC: 7.1 %
HDLC SERPL-MCNC: 74 MG/DL
LDLC SERPL CALC-MCNC: 49 MG/DL
NONHDLC SERPL-MCNC: 71 MG/DL
NT-PROBNP SERPL-MCNC: 2243 PG/ML
POTASSIUM SERPL-SCNC: 4.2 MMOL/L
PROT SERPL-MCNC: 7.1 G/DL
SODIUM SERPL-SCNC: 143 MMOL/L
T4 FREE SERPL-MCNC: 1.3 NG/DL
TRIGL SERPL-MCNC: 108 MG/DL
TSH SERPL-ACNC: 0.62 UIU/ML

## 2023-05-20 RX ADMIN — FUROSEMIDE 0 MG: 20 TABLET ORAL at 00:00

## 2023-05-23 ENCOUNTER — APPOINTMENT (OUTPATIENT)
Dept: CARE COORDINATION | Facility: HOME HEALTH | Age: 83
End: 2023-05-23
Payer: MEDICARE

## 2023-05-23 VITALS
TEMPERATURE: 97.8 F | DIASTOLIC BLOOD PRESSURE: 70 MMHG | HEART RATE: 76 BPM | WEIGHT: 146 LBS | RESPIRATION RATE: 16 BRPM | SYSTOLIC BLOOD PRESSURE: 110 MMHG | OXYGEN SATURATION: 99 % | BODY MASS INDEX: 23.46 KG/M2 | HEIGHT: 66 IN

## 2023-05-23 PROCEDURE — 99349 HOME/RES VST EST MOD MDM 40: CPT

## 2023-05-24 ENCOUNTER — TRANSCRIPTION ENCOUNTER (OUTPATIENT)
Age: 83
End: 2023-05-24

## 2023-05-25 ENCOUNTER — NON-APPOINTMENT (OUTPATIENT)
Age: 83
End: 2023-05-25

## 2023-05-25 ENCOUNTER — APPOINTMENT (OUTPATIENT)
Dept: INTERNAL MEDICINE | Facility: CLINIC | Age: 83
End: 2023-05-25
Payer: MEDICARE

## 2023-05-25 VITALS
SYSTOLIC BLOOD PRESSURE: 130 MMHG | HEIGHT: 66 IN | DIASTOLIC BLOOD PRESSURE: 90 MMHG | WEIGHT: 146 LBS | BODY MASS INDEX: 23.46 KG/M2 | HEART RATE: 100 BPM | OXYGEN SATURATION: 99 %

## 2023-05-25 VITALS — DIASTOLIC BLOOD PRESSURE: 78 MMHG | SYSTOLIC BLOOD PRESSURE: 115 MMHG

## 2023-05-25 DIAGNOSIS — G45.9 TRANSIENT CEREBRAL ISCHEMIC ATTACK, UNSPECIFIED: ICD-10-CM

## 2023-05-25 PROCEDURE — 93224 XTRNL ECG REC UP TO 48 HRS: CPT

## 2023-05-25 PROCEDURE — 93000 ELECTROCARDIOGRAM COMPLETE: CPT | Mod: 59

## 2023-05-25 PROCEDURE — 99214 OFFICE O/P EST MOD 30 MIN: CPT | Mod: 25

## 2023-05-25 RX ORDER — AMLODIPINE BESYLATE 2.5 MG/1
2.5 TABLET ORAL DAILY
Refills: 0 | Status: DISCONTINUED | COMMUNITY
Start: 2023-05-16 | End: 2023-05-25

## 2023-05-25 RX ORDER — NIFEDIPINE 30 MG/1
30 TABLET, EXTENDED RELEASE ORAL
Refills: 0 | Status: DISCONTINUED | COMMUNITY
End: 2023-05-25

## 2023-05-26 DIAGNOSIS — E83.42 HYPOMAGNESEMIA: ICD-10-CM

## 2023-05-26 LAB
ANION GAP SERPL CALC-SCNC: 15 MMOL/L
BUN SERPL-MCNC: 22 MG/DL
CALCIUM SERPL-MCNC: 10.2 MG/DL
CHLORIDE SERPL-SCNC: 97 MMOL/L
CO2 SERPL-SCNC: 23 MMOL/L
CREAT SERPL-MCNC: 1.06 MG/DL
EGFR: 53 ML/MIN/1.73M2
GLUCOSE SERPL-MCNC: 280 MG/DL
MAGNESIUM SERPL-MCNC: 1.4 MG/DL
NT-PROBNP SERPL-MCNC: 287 PG/ML
POTASSIUM SERPL-SCNC: 3.8 MMOL/L
SODIUM SERPL-SCNC: 135 MMOL/L

## 2023-05-26 NOTE — COUNSELING
[Fall prevention counseling provided] : Fall prevention counseling provided [Adequate lighting] : Adequate lighting [No throw rugs] : No throw rugs [Use proper foot wear] : Use proper foot wear [FreeTextEntry1] : pt has been advised to place non slip mat in bathtub and speak to landlord to install grab bars in shower. She agrees

## 2023-05-26 NOTE — HEALTH RISK ASSESSMENT
[No] : In the past 12 months have you used drugs other than those required for medical reasons? No [Any fall with injury in past year] : Patient reported fall with injury in the past year [0] : 2) Feeling down, depressed, or hopeless: Not at all (0) [PHQ-2 Negative - No further assessment needed] : PHQ-2 Negative - No further assessment needed [Diabetic Diet] : diabetic [Low Fat Diet] : low fat [Low Salt Diet] : low salt [Strict Adherence] : strict adherence [None] : None [Alone] : lives alone [# of Members in Household ___] :  household currently consist of [unfilled] member(s) [Retired] : retired [] :  [# Of Children ___] : has [unfilled] children [Feels Safe at Home] : Feels safe at home [Independent] : managing finances [Some assistance needed] : shopping [Reports normal functional visual acuity (ie: able to read med bottle)] : Reports normal functional visual acuity [Smoke Detector] : smoke detector [With Patient/Caregiver] : , with patient/caregiver [Reviewed no changes] : Reviewed, no changes [Designated Healthcare Proxy] : Designated healthcare proxy [Name: ___] : Health Care Proxy's Name: [unfilled]  [Relationship: ___] : Relationship: [unfilled] [Aggressive treatment] : aggressive treatment [I will adhere to the patient's wishes.] : I will adhere to the patient's wishes. [Time Spent: ___ minutes] : Time Spent: [unfilled] minutes [Never] : Never [de-identified] : Has not resumed walking outdoors [de-identified] : fell outdoors Nov 2022 resulting in fx to nose [[facility]] : 15 [[id number]] : 0 [Change in mental status noted] : No change in mental status noted [Language] : denies difficulty with language [Behavior] : denies difficulty with behavior [Learning/Retaining New Information] : denies difficulty learning/retaining new information [Handling Complex Tasks] : denies difficulty handling complex tasks [Reasoning] : denies difficulty with reasoning [Spatial Ability and Orientation] : denies difficulty with spatial ability and orientation [High Risk Behavior] : no high risk behavior [Reports changes in hearing] : Reports no changes in hearing [Reports changes in vision] : Reports no changes in vision [Reports changes in dental health] : Reports no changes in dental health [Safety elements used in home] : no safety elements used in home [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

## 2023-05-26 NOTE — HISTORY OF PRESENT ILLNESS
[Post-hospitalization from ___ Hospital] : Post-hospitalization from [unfilled] Hospital [Admitted on: ___] : The patient was admitted on [unfilled] [Discharged on ___] : discharged on [unfilled] [Discharge Summary] : discharge summary [Discharge Med List] : discharge medication list [Other: ____] : [unfilled] [Med Reconciliation] : medication reconciliation has been completed [Patient Contacted By: ____] : and contacted by [unfilled] [FreeTextEntry2] : 82 year old Female [employer] with PMHx of HTN, Afib on Eliquis, Tachy-Bryan syndrome s/p PPM, T2DM, CAD s/p recent NAVEEN x2 , p/w lightheadedness x 2 days.\par # Postural dizziness with presyncope\par # Afib\par # CAD\par # HTN\par tele appreciate EP recs, sotalol load discontinued by EP. Metoprolol succinate 50 mg instead. continue Eliquis continue plavix continue atorvastatin continue Diovan continue amlodipine.\par 83 y/o female seen today in her home for HS/TCM post hospital discharge. She reports since she has been home she is doing better, but has not resumed walking outside and going to get groceries. Her children bring groceries for her and accompany her to MD appts. If they are not available she has access a ride. She is monitoring her blood pressure and blood glucose at home. FS this AM 177mg/dl and her BP was 103/67  as per pt.  Reports she takes all of her medications as prescribed and keeps her MD appts. She was seen by Dr Garrett of [facility] on 5/16/23, Holter monitor worn as per pt,and returned. She was started on lasix and KCL. CARD  f/u on 6/22/23. She had  appt with PCP Vesta on 5/22/23. \par TCM explained and yellow card left in the home. \par \par

## 2023-05-26 NOTE — REVIEW OF SYSTEMS
[Fatigue] : fatigue [Lower Ext Edema] : lower extremity edema [Cough] : cough [Negative] : Psychiatric [Fever] : no fever [Chills] : no chills [Night Sweats] : no night sweats [Discharge] : no discharge [Pain] : no pain [Redness] : no redness [Dryness] : no dryness  [Vision Problems] : no vision problems [Itching] : no itching [Chest Pain] : no chest pain [Orthopnea] : no orthopnea [Shortness Of Breath] : no shortness of breath [Wheezing] : no wheezing [Dyspnea on Exertion] : no dyspnea on exertion [Easy Bleeding] : no easy bleeding [Easy Bruising] : no easy bruising [FreeTextEntry5] : reports previous edema, started on lasix 5/19/23 [FreeTextEntry6] : using benzonatate as needed

## 2023-05-26 NOTE — PLAN
[FreeTextEntry1] : -follow up with CARD including recommendation for EPS\par -follow up with recommendation to see PULM Dr Nunez\par -follow up with PCP

## 2023-05-26 NOTE — PHYSICAL EXAM
[No Acute Distress] : no acute distress [Well Nourished] : well nourished [Well Developed] : well developed [Normal Voice/Communication] : normal voice/communication [Normal Sclera/Conjunctiva] : normal sclera/conjunctiva [PERRL] : pupils equal round and reactive to light [Normal Outer Ear/Nose] : the outer ears and nose were normal in appearance [Normal Oropharynx] : the oropharynx was normal [No JVD] : no jugular venous distention [Supple] : supple [No Respiratory Distress] : no respiratory distress  [Clear to Auscultation] : lungs were clear to auscultation bilaterally [Normal S1, S2] : normal S1 and S2 [Pedal Pulses Present] : the pedal pulses are present [No Edema] : there was no peripheral edema [Soft] : abdomen soft [Non Tender] : non-tender [Non-distended] : non-distended [Normal Bowel Sounds] : normal bowel sounds [No CVA Tenderness] : no CVA  tenderness [No Spinal Tenderness] : no spinal tenderness [Grossly Normal Strength/Tone] : grossly normal strength/tone [No Rash] : no rash [Normal Gait] : normal gait [Coordination Grossly Intact] : coordination grossly intact [No Focal Deficits] : no focal deficits [Normal Affect] : the affect was normal [Alert and Oriented x3] : oriented to person, place, and time [Normal Mood] : the mood was normal [Normal Insight/Judgement] : insight and judgment were intact [de-identified] : afib PPM set at 60 [de-identified] : recently started on lasix 20 mg daily

## 2023-05-26 NOTE — CURRENT MEDS
[Takes medication as prescribed] : takes [None] : Patient does not have any barriers to medication adherence [Yes] : Reviewed medication list for presence of high-risk medications. [Blood Thinners] : blood thinners [FreeTextEntry1] : Pt educated on use of lasix and KCL. States she will take it in the morning before her other medications. Advised to take with food to avoid GI upset

## 2023-05-28 PROBLEM — G45.9 TIA (TRANSIENT ISCHEMIC ATTACK): Status: ACTIVE | Noted: 2023-05-16

## 2023-05-28 NOTE — PHYSICAL EXAM
[No Acute Distress] : no acute distress [Well Nourished] : well nourished [Well Developed] : well developed [Well-Appearing] : well-appearing [Normal Sclera/Conjunctiva] : normal sclera/conjunctiva [PERRL] : pupils equal round and reactive to light [Normal Outer Ear/Nose] : the outer ears and nose were normal in appearance [Normal Oropharynx] : the oropharynx was normal [No JVD] : no jugular venous distention [No Lymphadenopathy] : no lymphadenopathy [Supple] : supple [No Respiratory Distress] : no respiratory distress  [No Accessory Muscle Use] : no accessory muscle use [Clear to Auscultation] : lungs were clear to auscultation bilaterally [Regular Rhythm] : with a regular rhythm [Normal S1, S2] : normal S1 and S2 [No Carotid Bruits] : no carotid bruits [No Varicosities] : no varicosities [Pedal Pulses Present] : the pedal pulses are present [No Edema] : there was no peripheral edema [No Extremity Clubbing/Cyanosis] : no extremity clubbing/cyanosis [Soft] : abdomen soft [Non Tender] : non-tender [Non-distended] : non-distended [No HSM] : no HSM [Normal Bowel Sounds] : normal bowel sounds [Normal Anterior Cervical Nodes] : no anterior cervical lymphadenopathy [No CVA Tenderness] : no CVA  tenderness [No Spinal Tenderness] : no spinal tenderness [No Joint Swelling] : no joint swelling [Grossly Normal Strength/Tone] : grossly normal strength/tone [No Rash] : no rash [Coordination Grossly Intact] : coordination grossly intact [No Focal Deficits] : no focal deficits [Normal Gait] : normal gait [Normal Affect] : the affect was normal [Normal Rate] : normal rate  [Alert and Oriented x3] : oriented to person, place, and time

## 2023-05-28 NOTE — HISTORY OF PRESENT ILLNESS
[FreeTextEntry1] : followup on ?HR [de-identified] : 82 y.o female w/MHx of CAD s/p PCI 4/3/2023, TIA, Afib, PPM, HLD, HTN, DM, Emphysema,  recently in afib here for HR followup\par On last vist with Dr Garrett,  toprol was increased to 50mg bid, palps better but not resolved. She still feels like heart racesa  good amount of the time and wants to go back to toprol 150mg ER daily as she was prior. Also last week, probnp elevated 2000s last week thus was started lasix 20mg and kcl 10meq. Pt says legs are not swollen and wants to stop lasix\par Patient feels well. No complaints. Denies chest pain, sob, barcenas, dizziness, diaphoresis, palpitations, LE swelling, orthopnea, syncope, n/v, headache.\par \par \par \par

## 2023-05-30 ENCOUNTER — NON-APPOINTMENT (OUTPATIENT)
Age: 83
End: 2023-05-30

## 2023-05-31 ENCOUNTER — NON-APPOINTMENT (OUTPATIENT)
Age: 83
End: 2023-05-31

## 2023-05-31 ENCOUNTER — APPOINTMENT (OUTPATIENT)
Dept: CARDIOLOGY | Facility: CLINIC | Age: 83
End: 2023-05-31
Payer: MEDICARE

## 2023-05-31 VITALS
HEART RATE: 60 BPM | SYSTOLIC BLOOD PRESSURE: 128 MMHG | OXYGEN SATURATION: 98 % | BODY MASS INDEX: 23.95 KG/M2 | HEIGHT: 66 IN | DIASTOLIC BLOOD PRESSURE: 70 MMHG | TEMPERATURE: 97.8 F | WEIGHT: 149 LBS

## 2023-05-31 DIAGNOSIS — E87.70 FLUID OVERLOAD, UNSPECIFIED: ICD-10-CM

## 2023-05-31 DIAGNOSIS — R42 DIZZINESS AND GIDDINESS: ICD-10-CM

## 2023-05-31 PROCEDURE — 93000 ELECTROCARDIOGRAM COMPLETE: CPT

## 2023-05-31 PROCEDURE — 99214 OFFICE O/P EST MOD 30 MIN: CPT

## 2023-05-31 NOTE — HISTORY OF PRESENT ILLNESS
[FreeTextEntry1] : This is an 83 y/o female with a pmhx of CAD s/p PCI 4/3/2023, TIA, Afib, PPM, HLD, HTN, DM, Emphysema, recently in afib here for a follow up. Pt had 24 hour holter which showed  AF (94.5% of monitoring time) with average HR 93, occasional paced rhythm, few isolated PVC's. Pt continues to report dyspnea. She reports yesterday she was experiencing palpitations, she took propafenone twice yesterday with improvement of symptoms. Pt admits to dizziness, denies syncope. Pt denies chest pain.

## 2023-06-01 ENCOUNTER — NON-APPOINTMENT (OUTPATIENT)
Age: 83
End: 2023-06-01

## 2023-06-01 ENCOUNTER — APPOINTMENT (OUTPATIENT)
Dept: ELECTROPHYSIOLOGY | Facility: CLINIC | Age: 83
End: 2023-06-01
Payer: MEDICARE

## 2023-06-01 VITALS — OXYGEN SATURATION: 98 % | HEART RATE: 60 BPM | SYSTOLIC BLOOD PRESSURE: 173 MMHG | DIASTOLIC BLOOD PRESSURE: 87 MMHG

## 2023-06-01 VITALS — HEIGHT: 66 IN | BODY MASS INDEX: 24.59 KG/M2 | WEIGHT: 153 LBS

## 2023-06-01 VITALS — DIASTOLIC BLOOD PRESSURE: 76 MMHG | SYSTOLIC BLOOD PRESSURE: 159 MMHG

## 2023-06-01 VITALS — SYSTOLIC BLOOD PRESSURE: 149 MMHG | DIASTOLIC BLOOD PRESSURE: 85 MMHG

## 2023-06-01 DIAGNOSIS — I49.5 SICK SINUS SYNDROME: ICD-10-CM

## 2023-06-01 DIAGNOSIS — Z79.899 OTHER LONG TERM (CURRENT) DRUG THERAPY: ICD-10-CM

## 2023-06-01 DIAGNOSIS — I48.19 OTHER PERSISTENT ATRIAL FIBRILLATION: ICD-10-CM

## 2023-06-01 PROCEDURE — 93000 ELECTROCARDIOGRAM COMPLETE: CPT

## 2023-06-01 PROCEDURE — 99204 OFFICE O/P NEW MOD 45 MIN: CPT

## 2023-06-02 ENCOUNTER — APPOINTMENT (OUTPATIENT)
Dept: CARDIOLOGY | Facility: CLINIC | Age: 83
End: 2023-06-02

## 2023-06-02 NOTE — CARDIOLOGY SUMMARY
[de-identified] : 10/2018: ------------------------------------------------------------------------\par IMPRESSIONS:Normal Study\par * Myocardial Perfusion SPECT results are normal.\par * Normal myocardial perfusion scan,with no evidence of\par infarction or inducible ischemia.\par * Post-stress gated wall motion analysis was performed\par (LVEF > 70%;LVEDV = 51 ml.), revealing normal LV function.\par RV function appeared normal. [de-identified] : 5/10/2023: CONCLUSIONS:\par  \par  1. Normal left ventricular cavity size. The left ventricular wall thickness is normal. The left ventricular systolic function is normal with an ejection fraction visually estimated at 70 to 75 %. There are no regional wall motion abnormalities seen.\par  2. Normal right ventricular cavity size and normal systolic function. [de-identified] : 4/4/2023: \par Diagnostic Findings: \par \par Coronary Angiography \par The coronary circulation is right dominant.  \par \par LM \par Left main artery: Angiography shows minor irregularities.  \par \par LAD \par Mid left anterior descending: There is a 50 % stenosis. Instant\par wave-free ratio was performed with a calculated value of 0.94.\par Based on the results of this study, the lesion was judged to be\par non-significant and no intervention was performed.\par \par CX \par Mid circumflex: There is a 90 % stenosis.

## 2023-06-02 NOTE — DISCUSSION/SUMMARY
[EKG obtained to assist in diagnosis and management of assessed problem(s)] : EKG obtained to assist in diagnosis and management of assessed problem(s) [FreeTextEntry1] : Impression:\par \par 1. Persistent afib: EKG performed today to assess for presence of recurrent afib and reveals NSR with occasional atrial pacing. Now on Amiodarone. Review of EKG from Cardiologist reveals afib with rates to 110bpm. Recent ECHO with normal LVEF. Given recurrent symptomatic afib despite rate control management and propafenone use, discussed improved treatment options such as ablation. Wishes to avoid procedures at this time. Will resume use of Amiodarone for improved afib suppression. Rediscussed adverse effect profile of Amiodarone including need for frequent monitoring of TFTs, LFTs, Opthalmology examination and PFTs. Resume Eliquis for thromboembolic prophylaxis as prescribed. If shes wishes to consider ablation in future, will call office to schedule. \par \par 2. Sinus node dysfunction: s/p dual chamber PPM placement. Resume routine device checks. \par \par 3. HTN: resume oral antihypertensives as prescribed. Encouraged heart healthy diet, sodium restriction, and weight loss. Continue regular f/u with Cardiologist for further HTN management.\par \par 4. HLD: resume statin therapy as prescribed and regular f/u with Cardiologist for routine lipid monitoring and management.\par \par Will continue f/u with Cardiologist and may RTO as needed or if any new or worsening symptoms or findings occur.

## 2023-06-02 NOTE — HISTORY OF PRESENT ILLNESS
[FreeTextEntry1] : Loc Kelsey is an 81y/o woman with Hx of CAD s/p PCI 4/3/2023, TIA, HLD, HTN, DM, Emphysema, sinus node dysfunction s/p dual chamber PPM placement and paroxysmal afib, now on Amiodarone, who presents today for initial evaluation. Recently saw Cardiologist after having more recurrent afib. Admits a long standing history of afib, previously on propafenone. When in afib, feels palpitations. Saw Cardiologist and was started on Amiodarone with conversion to NSR. She believes her afib is triggered by caffeine intake. Has had no prior procedure for afib, wishes to avoid. When in NSR, feels well. Denies chest pain, SOB, syncope or near syncope. Remains on Eliquis without s/s of bleeding.

## 2023-06-07 ENCOUNTER — NON-APPOINTMENT (OUTPATIENT)
Age: 83
End: 2023-06-07

## 2023-06-07 ENCOUNTER — APPOINTMENT (OUTPATIENT)
Dept: CARDIOLOGY | Facility: CLINIC | Age: 83
End: 2023-06-07
Payer: MEDICARE

## 2023-06-07 VITALS
SYSTOLIC BLOOD PRESSURE: 130 MMHG | DIASTOLIC BLOOD PRESSURE: 70 MMHG | WEIGHT: 150 LBS | OXYGEN SATURATION: 99 % | HEART RATE: 79 BPM | TEMPERATURE: 98.2 F | BODY MASS INDEX: 24.11 KG/M2 | HEIGHT: 66 IN

## 2023-06-07 DIAGNOSIS — R35.0 FREQUENCY OF MICTURITION: ICD-10-CM

## 2023-06-07 PROCEDURE — 99214 OFFICE O/P EST MOD 30 MIN: CPT

## 2023-06-07 PROCEDURE — 93000 ELECTROCARDIOGRAM COMPLETE: CPT

## 2023-06-07 RX ORDER — CHLORHEXIDINE GLUCONATE 4 %
400 (240 MG) LIQUID (ML) TOPICAL
Qty: 180 | Refills: 3 | Status: ACTIVE | COMMUNITY
Start: 2023-06-07 | End: 1900-01-01

## 2023-06-07 RX ORDER — POTASSIUM CHLORIDE 750 MG/1
10 TABLET, FILM COATED, EXTENDED RELEASE ORAL DAILY
Qty: 90 | Refills: 1 | Status: DISCONTINUED | COMMUNITY
Start: 2023-05-19 | End: 2023-06-07

## 2023-06-07 NOTE — HISTORY OF PRESENT ILLNESS
[FreeTextEntry1] : This is a 81 y/o female with a pmhx of CAD s/p PCI 4/3/2023, TIA, Afib, PPM, HLD, HTN, DM, Emphysema, recently in afib here for a follow up. Patient was seen in our office 5/31/23 c/o QUEZDAA and was scheduled for STN. She saw Dr. Butcher who advised ablation for AF, but patient refusing. EP recommended to continue Amiodarone and Eliquis. \par Patient reports intermittent edema of B/L LE. Patient denies palpitations. She continues to reports dyspnea on exertion.  She denies dizziness or syncope.

## 2023-06-08 ENCOUNTER — NON-APPOINTMENT (OUTPATIENT)
Age: 83
End: 2023-06-08

## 2023-06-09 ENCOUNTER — OUTPATIENT (OUTPATIENT)
Dept: OUTPATIENT SERVICES | Facility: HOSPITAL | Age: 83
LOS: 1 days | End: 2023-06-09
Payer: MEDICARE

## 2023-06-09 ENCOUNTER — APPOINTMENT (OUTPATIENT)
Dept: CV DIAGNOSTICS | Facility: HOSPITAL | Age: 83
End: 2023-06-09

## 2023-06-09 DIAGNOSIS — R06.09 OTHER FORMS OF DYSPNEA: ICD-10-CM

## 2023-06-09 PROCEDURE — 93018 CV STRESS TEST I&R ONLY: CPT | Mod: GC

## 2023-06-09 PROCEDURE — 78452 HT MUSCLE IMAGE SPECT MULT: CPT | Mod: 26,MH

## 2023-06-09 PROCEDURE — 93016 CV STRESS TEST SUPVJ ONLY: CPT | Mod: GC

## 2023-06-14 ENCOUNTER — NON-APPOINTMENT (OUTPATIENT)
Age: 83
End: 2023-06-14

## 2023-06-16 ENCOUNTER — NON-APPOINTMENT (OUTPATIENT)
Age: 83
End: 2023-06-16

## 2023-06-16 ENCOUNTER — APPOINTMENT (OUTPATIENT)
Dept: CARDIOLOGY | Facility: CLINIC | Age: 83
End: 2023-06-16
Payer: MEDICARE

## 2023-06-16 VITALS
HEIGHT: 66 IN | SYSTOLIC BLOOD PRESSURE: 160 MMHG | BODY MASS INDEX: 23.46 KG/M2 | TEMPERATURE: 98 F | DIASTOLIC BLOOD PRESSURE: 70 MMHG | OXYGEN SATURATION: 97 % | HEART RATE: 68 BPM | WEIGHT: 146 LBS

## 2023-06-16 PROCEDURE — 99214 OFFICE O/P EST MOD 30 MIN: CPT

## 2023-06-16 PROCEDURE — 93000 ELECTROCARDIOGRAM COMPLETE: CPT

## 2023-06-16 NOTE — HISTORY OF PRESENT ILLNESS
[FreeTextEntry1] : This is a 83 y/o female with a pmhx of CAD s/p PCI 4/3/2023, TIA, Afib, PPM, HLD, HTN, DM, Emphysema follows with Dr. Butcher who advised ablation for AF, but patient refusing- continues on Amiodarone and Eliquis. \par s/p STP for QUEZADA\par as per pt she was told she needs to do it again pt was scheduled to finish it yesterday however pt was scared and cancelled it \par Myocardial Perfusion SPECT results are probably normal.\par * The left ventricle was small in size. Normal myocardial\par perfusion scan,with no evidence of infarction or inducible\par ischemia. There was a very small and mild anteroseptal\par defect that appeared adjacent to RV insertion and was thus\par likely normal variant\par \par pt continues to report some QUEZADA. reports some pressure in chest when shes taking a bath \par Denies any LE swelling \par

## 2023-06-22 ENCOUNTER — APPOINTMENT (OUTPATIENT)
Dept: CARDIOLOGY | Facility: CLINIC | Age: 83
End: 2023-06-22

## 2023-06-22 ENCOUNTER — RX RENEWAL (OUTPATIENT)
Age: 83
End: 2023-06-22

## 2023-06-30 ENCOUNTER — NON-APPOINTMENT (OUTPATIENT)
Age: 83
End: 2023-06-30

## 2023-06-30 ENCOUNTER — APPOINTMENT (OUTPATIENT)
Dept: PULMONOLOGY | Facility: CLINIC | Age: 83
End: 2023-06-30
Payer: MEDICARE

## 2023-06-30 ENCOUNTER — APPOINTMENT (OUTPATIENT)
Dept: INTERNAL MEDICINE | Facility: CLINIC | Age: 83
End: 2023-06-30
Payer: MEDICARE

## 2023-06-30 VITALS
WEIGHT: 145 LBS | DIASTOLIC BLOOD PRESSURE: 65 MMHG | TEMPERATURE: 98.5 F | OXYGEN SATURATION: 96 % | SYSTOLIC BLOOD PRESSURE: 120 MMHG | HEART RATE: 68 BPM | HEIGHT: 66 IN | BODY MASS INDEX: 23.3 KG/M2

## 2023-06-30 DIAGNOSIS — R60.0 LOCALIZED EDEMA: ICD-10-CM

## 2023-06-30 DIAGNOSIS — R06.09 OTHER FORMS OF DYSPNEA: ICD-10-CM

## 2023-06-30 DIAGNOSIS — Z00.00 ENCOUNTER FOR GENERAL ADULT MEDICAL EXAMINATION W/OUT ABNORMAL FINDINGS: ICD-10-CM

## 2023-06-30 PROCEDURE — 93000 ELECTROCARDIOGRAM COMPLETE: CPT

## 2023-06-30 PROCEDURE — 71046 X-RAY EXAM CHEST 2 VIEWS: CPT

## 2023-06-30 PROCEDURE — 99214 OFFICE O/P EST MOD 30 MIN: CPT | Mod: 25

## 2023-06-30 NOTE — PHYSICAL EXAM
[No Acute Distress] : no acute distress [Well Nourished] : well nourished [Well Developed] : well developed [Well-Appearing] : well-appearing [Normal Sclera/Conjunctiva] : normal sclera/conjunctiva [Normal Outer Ear/Nose] : the outer ears and nose were normal in appearance [Normal Oropharynx] : the oropharynx was normal [No JVD] : no jugular venous distention [No Lymphadenopathy] : no lymphadenopathy [Supple] : supple [No Respiratory Distress] : no respiratory distress  [No Accessory Muscle Use] : no accessory muscle use [Clear to Auscultation] : lungs were clear to auscultation bilaterally [Normal Rate] : normal rate  [Regular Rhythm] : with a regular rhythm [Normal S1, S2] : normal S1 and S2 [No Murmur] : no murmur heard [No Carotid Bruits] : no carotid bruits [No Varicosities] : no varicosities [Pedal Pulses Present] : the pedal pulses are present [No Edema] : there was no peripheral edema [No Extremity Clubbing/Cyanosis] : no extremity clubbing/cyanosis [Soft] : abdomen soft [Non Tender] : non-tender [Non-distended] : non-distended [Normal Bowel Sounds] : normal bowel sounds [Normal Anterior Cervical Nodes] : no anterior cervical lymphadenopathy [No CVA Tenderness] : no CVA  tenderness [No Spinal Tenderness] : no spinal tenderness [No Joint Swelling] : no joint swelling [Grossly Normal Strength/Tone] : grossly normal strength/tone [No Rash] : no rash [Coordination Grossly Intact] : coordination grossly intact [No Focal Deficits] : no focal deficits [Normal Gait] : normal gait [Alert and Oriented x3] : oriented to person, place, and time

## 2023-06-30 NOTE — HISTORY OF PRESENT ILLNESS
[FreeTextEntry1] : QUEZADA [de-identified] : This is a 83 y/o female with a pmhx of CAD s/p PCI 4/3/2023 on plavix, TIA, Afib, PPM, HLD, HTN, DM, Emphysema  AFib on eliquis presents  QUEZADA. She was started on lasix 20mg by Dr hernandez on 6/16 and Leg swelling has resolved. Still Reports QUEZADA which has not resolved despite lasix use. \par Denies cough, f/c.\par Denies chest pain, SOB at rest, dizziness, diaphoresis, palpitations, LE swelling, orthopnea, syncope, vomiting, headache. \par She had recent NST: She has NST: Myocardial Perfusion SPECT results are probably normal.* The left ventricle was small in size. Normal myocardial\par perfusion scan,with no evidence of infarction or inducible ischemia. There was a very small and mild anteroseptal\par defect that appeared adjacent to RV insertion and was thuslikely normal variant

## 2023-06-30 NOTE — HEALTH RISK ASSESSMENT
[0] : 2) Feeling down, depressed, or hopeless: Not at all (0) [PHQ-2 Negative - No further assessment needed] : PHQ-2 Negative - No further assessment needed [Never] : Never [[id number]] : 0

## 2023-07-03 ENCOUNTER — NON-APPOINTMENT (OUTPATIENT)
Age: 83
End: 2023-07-03

## 2023-07-05 ENCOUNTER — OUTPATIENT (OUTPATIENT)
Dept: OUTPATIENT SERVICES | Facility: HOSPITAL | Age: 83
LOS: 1 days | End: 2023-07-05
Payer: MEDICARE

## 2023-07-05 LAB
ALBUMIN SERPL ELPH-MCNC: 4.6 G/DL
ALP BLD-CCNC: 88 U/L
ALT SERPL-CCNC: 16 U/L
ANION GAP SERPL CALC-SCNC: 20 MMOL/L
AST SERPL-CCNC: 20 U/L
BASOPHILS # BLD AUTO: 0.07 K/UL
BASOPHILS NFR BLD AUTO: 1 %
BILIRUB SERPL-MCNC: 0.5 MG/DL
BUN SERPL-MCNC: 30 MG/DL
CALCIUM SERPL-MCNC: 9.9 MG/DL
CHLORIDE SERPL-SCNC: 97 MMOL/L
CO2 SERPL-SCNC: 22 MMOL/L
CREAT SERPL-MCNC: 1.52 MG/DL
EGFR: 34 ML/MIN/1.73M2
EOSINOPHIL # BLD AUTO: 0.07 K/UL
EOSINOPHIL NFR BLD AUTO: 1 %
GLUCOSE SERPL-MCNC: 91 MG/DL
HCT VFR BLD CALC: 39.6 %
HGB BLD-MCNC: 13.1 G/DL
IMM GRANULOCYTES NFR BLD AUTO: 0.4 %
LYMPHOCYTES # BLD AUTO: 2.6 K/UL
LYMPHOCYTES NFR BLD AUTO: 35.7 %
MAN DIFF?: NORMAL
MCHC RBC-ENTMCNC: 28.9 PG
MCHC RBC-ENTMCNC: 33.1 GM/DL
MCV RBC AUTO: 87.2 FL
MONOCYTES # BLD AUTO: 0.67 K/UL
MONOCYTES NFR BLD AUTO: 9.2 %
NEUTROPHILS # BLD AUTO: 3.84 K/UL
NEUTROPHILS NFR BLD AUTO: 52.7 %
NT-PROBNP SERPL-MCNC: 294 PG/ML
PLATELET # BLD AUTO: 231 K/UL
POTASSIUM SERPL-SCNC: 3.7 MMOL/L
PROT SERPL-MCNC: 6.8 G/DL
RBC # BLD: 4.54 M/UL
RBC # FLD: 11.9 %
SODIUM SERPL-SCNC: 139 MMOL/L
WBC # FLD AUTO: 7.28 K/UL

## 2023-07-05 PROCEDURE — 80053 COMPREHEN METABOLIC PANEL: CPT

## 2023-07-05 PROCEDURE — 93005 ELECTROCARDIOGRAM TRACING: CPT

## 2023-07-05 PROCEDURE — 82962 GLUCOSE BLOOD TEST: CPT

## 2023-07-05 PROCEDURE — 85027 COMPLETE CBC AUTOMATED: CPT

## 2023-07-07 ENCOUNTER — RX RENEWAL (OUTPATIENT)
Age: 83
End: 2023-07-07

## 2023-07-07 DIAGNOSIS — N17.9 ACUTE KIDNEY FAILURE, UNSPECIFIED: ICD-10-CM

## 2023-07-07 LAB
ANION GAP SERPL CALC-SCNC: 14 MMOL/L
BUN SERPL-MCNC: 31 MG/DL
CALCIUM SERPL-MCNC: 9.5 MG/DL
CHLORIDE SERPL-SCNC: 99 MMOL/L
CO2 SERPL-SCNC: 24 MMOL/L
CREAT SERPL-MCNC: 1.31 MG/DL
EGFR: 41 ML/MIN/1.73M2
GLUCOSE SERPL-MCNC: 335 MG/DL
POTASSIUM SERPL-SCNC: 3.8 MMOL/L
SODIUM SERPL-SCNC: 136 MMOL/L

## 2023-07-11 PROBLEM — I25.10 ATHEROSCLEROTIC HEART DISEASE OF NATIVE CORONARY ARTERY WITHOUT ANGINA PECTORIS: Chronic | Status: ACTIVE | Noted: 2023-07-05

## 2023-07-11 PROBLEM — N20.0 CALCULUS OF KIDNEY: Chronic | Status: ACTIVE | Noted: 2023-07-05

## 2023-07-12 ENCOUNTER — NON-APPOINTMENT (OUTPATIENT)
Age: 83
End: 2023-07-12

## 2023-07-13 DIAGNOSIS — R06.00 DYSPNEA, UNSPECIFIED: ICD-10-CM

## 2023-07-14 ENCOUNTER — APPOINTMENT (OUTPATIENT)
Dept: INTERNAL MEDICINE | Facility: CLINIC | Age: 83
End: 2023-07-14

## 2023-07-14 ENCOUNTER — INPATIENT (INPATIENT)
Facility: HOSPITAL | Age: 83
LOS: 0 days | Discharge: HOME | DRG: 247 | End: 2023-07-15
Attending: INTERNAL MEDICINE | Admitting: INTERNAL MEDICINE
Payer: COMMERCIAL

## 2023-07-14 ENCOUNTER — TRANSCRIPTION ENCOUNTER (OUTPATIENT)
Age: 83
End: 2023-07-14

## 2023-07-14 VITALS
SYSTOLIC BLOOD PRESSURE: 153 MMHG | TEMPERATURE: 98 F | RESPIRATION RATE: 16 BRPM | HEART RATE: 61 BPM | DIASTOLIC BLOOD PRESSURE: 67 MMHG | OXYGEN SATURATION: 98 % | WEIGHT: 147.05 LBS | HEIGHT: 66 IN

## 2023-07-14 DIAGNOSIS — Z95.5 PRESENCE OF CORONARY ANGIOPLASTY IMPLANT AND GRAFT: Chronic | ICD-10-CM

## 2023-07-14 DIAGNOSIS — R06.09 OTHER FORMS OF DYSPNEA: ICD-10-CM

## 2023-07-14 LAB
ALBUMIN SERPL ELPH-MCNC: 4.4 G/DL — SIGNIFICANT CHANGE UP (ref 3.3–5)
ALP SERPL-CCNC: 86 U/L — SIGNIFICANT CHANGE UP (ref 40–120)
ALT FLD-CCNC: 15 U/L — SIGNIFICANT CHANGE UP (ref 10–45)
ANION GAP SERPL CALC-SCNC: 12 MMOL/L — SIGNIFICANT CHANGE UP (ref 5–17)
AST SERPL-CCNC: 19 U/L — SIGNIFICANT CHANGE UP (ref 10–40)
BILIRUB SERPL-MCNC: 0.8 MG/DL — SIGNIFICANT CHANGE UP (ref 0.2–1.2)
BUN SERPL-MCNC: 16 MG/DL — SIGNIFICANT CHANGE UP (ref 7–23)
CALCIUM SERPL-MCNC: 9.4 MG/DL — SIGNIFICANT CHANGE UP (ref 8.4–10.5)
CHLORIDE SERPL-SCNC: 105 MMOL/L — SIGNIFICANT CHANGE UP (ref 96–108)
CO2 SERPL-SCNC: 23 MMOL/L — SIGNIFICANT CHANGE UP (ref 22–31)
CREAT SERPL-MCNC: 1.16 MG/DL — SIGNIFICANT CHANGE UP (ref 0.5–1.3)
EGFR: 47 ML/MIN/1.73M2 — LOW
GLUCOSE BLDC GLUCOMTR-MCNC: 114 MG/DL — HIGH (ref 70–99)
GLUCOSE BLDC GLUCOMTR-MCNC: 172 MG/DL — HIGH (ref 70–99)
GLUCOSE BLDC GLUCOMTR-MCNC: 198 MG/DL — HIGH (ref 70–99)
GLUCOSE SERPL-MCNC: 169 MG/DL — HIGH (ref 70–99)
HCT VFR BLD CALC: 34.2 % — LOW (ref 34.5–45)
HGB BLD-MCNC: 11.4 G/DL — LOW (ref 11.5–15.5)
MCHC RBC-ENTMCNC: 28.6 PG — SIGNIFICANT CHANGE UP (ref 27–34)
MCHC RBC-ENTMCNC: 33.3 GM/DL — SIGNIFICANT CHANGE UP (ref 32–36)
MCV RBC AUTO: 85.7 FL — SIGNIFICANT CHANGE UP (ref 80–100)
NRBC # BLD: 0 /100 WBCS — SIGNIFICANT CHANGE UP (ref 0–0)
PLATELET # BLD AUTO: 207 K/UL — SIGNIFICANT CHANGE UP (ref 150–400)
POTASSIUM SERPL-MCNC: 3.9 MMOL/L — SIGNIFICANT CHANGE UP (ref 3.5–5.3)
POTASSIUM SERPL-SCNC: 3.9 MMOL/L — SIGNIFICANT CHANGE UP (ref 3.5–5.3)
PROT SERPL-MCNC: 7.1 G/DL — SIGNIFICANT CHANGE UP (ref 6–8.3)
RBC # BLD: 3.99 M/UL — SIGNIFICANT CHANGE UP (ref 3.8–5.2)
RBC # FLD: 12.4 % — SIGNIFICANT CHANGE UP (ref 10.3–14.5)
SODIUM SERPL-SCNC: 140 MMOL/L — SIGNIFICANT CHANGE UP (ref 135–145)
WBC # BLD: 7.69 K/UL — SIGNIFICANT CHANGE UP (ref 3.8–10.5)
WBC # FLD AUTO: 7.69 K/UL — SIGNIFICANT CHANGE UP (ref 3.8–10.5)

## 2023-07-14 PROCEDURE — 99152 MOD SED SAME PHYS/QHP 5/>YRS: CPT

## 2023-07-14 PROCEDURE — 93010 ELECTROCARDIOGRAM REPORT: CPT | Mod: 76

## 2023-07-14 PROCEDURE — 93454 CORONARY ARTERY ANGIO S&I: CPT | Mod: 26,59

## 2023-07-14 PROCEDURE — 92928 PRQ TCAT PLMT NTRAC ST 1 LES: CPT | Mod: LD

## 2023-07-14 RX ORDER — VALSARTAN 80 MG/1
320 TABLET ORAL DAILY
Refills: 0 | Status: DISCONTINUED | OUTPATIENT
Start: 2023-07-14 | End: 2023-07-15

## 2023-07-14 RX ORDER — SODIUM CHLORIDE 9 MG/ML
1000 INJECTION, SOLUTION INTRAVENOUS
Refills: 0 | Status: DISCONTINUED | OUTPATIENT
Start: 2023-07-14 | End: 2023-07-15

## 2023-07-14 RX ORDER — AMLODIPINE BESYLATE 2.5 MG/1
2.5 TABLET ORAL DAILY
Refills: 0 | Status: DISCONTINUED | OUTPATIENT
Start: 2023-07-14 | End: 2023-07-15

## 2023-07-14 RX ORDER — AMIODARONE HYDROCHLORIDE 400 MG/1
1 TABLET ORAL
Refills: 0 | DISCHARGE

## 2023-07-14 RX ORDER — METOPROLOL TARTRATE 50 MG
100 TABLET ORAL DAILY
Refills: 0 | Status: DISCONTINUED | OUTPATIENT
Start: 2023-07-14 | End: 2023-07-15

## 2023-07-14 RX ORDER — SODIUM CHLORIDE 9 MG/ML
1000 INJECTION INTRAMUSCULAR; INTRAVENOUS; SUBCUTANEOUS
Refills: 0 | Status: DISCONTINUED | OUTPATIENT
Start: 2023-07-14 | End: 2023-07-15

## 2023-07-14 RX ORDER — INSULIN LISPRO 100/ML
VIAL (ML) SUBCUTANEOUS AT BEDTIME
Refills: 0 | Status: DISCONTINUED | OUTPATIENT
Start: 2023-07-14 | End: 2023-07-15

## 2023-07-14 RX ORDER — ATORVASTATIN CALCIUM 80 MG/1
40 TABLET, FILM COATED ORAL AT BEDTIME
Refills: 0 | Status: DISCONTINUED | OUTPATIENT
Start: 2023-07-14 | End: 2023-07-15

## 2023-07-14 RX ORDER — GLIMEPIRIDE 1 MG
1 TABLET ORAL
Refills: 0 | DISCHARGE

## 2023-07-14 RX ORDER — METOPROLOL TARTRATE 50 MG
50 TABLET ORAL AT BEDTIME
Refills: 0 | Status: DISCONTINUED | OUTPATIENT
Start: 2023-07-14 | End: 2023-07-15

## 2023-07-14 RX ORDER — DEXTROSE 50 % IN WATER 50 %
12.5 SYRINGE (ML) INTRAVENOUS ONCE
Refills: 0 | Status: DISCONTINUED | OUTPATIENT
Start: 2023-07-14 | End: 2023-07-15

## 2023-07-14 RX ORDER — INSULIN LISPRO 100/ML
VIAL (ML) SUBCUTANEOUS
Refills: 0 | Status: DISCONTINUED | OUTPATIENT
Start: 2023-07-14 | End: 2023-07-15

## 2023-07-14 RX ORDER — GLUCAGON INJECTION, SOLUTION 0.5 MG/.1ML
1 INJECTION, SOLUTION SUBCUTANEOUS ONCE
Refills: 0 | Status: DISCONTINUED | OUTPATIENT
Start: 2023-07-14 | End: 2023-07-15

## 2023-07-14 RX ORDER — PANTOPRAZOLE SODIUM 20 MG/1
40 TABLET, DELAYED RELEASE ORAL ONCE
Refills: 0 | Status: COMPLETED | OUTPATIENT
Start: 2023-07-14 | End: 2023-07-14

## 2023-07-14 RX ORDER — ASPIRIN/CALCIUM CARB/MAGNESIUM 324 MG
81 TABLET ORAL DAILY
Refills: 0 | Status: DISCONTINUED | OUTPATIENT
Start: 2023-07-14 | End: 2023-07-15

## 2023-07-14 RX ORDER — FUROSEMIDE 40 MG
20 TABLET ORAL DAILY
Refills: 0 | Status: DISCONTINUED | OUTPATIENT
Start: 2023-07-14 | End: 2023-07-15

## 2023-07-14 RX ORDER — CLOPIDOGREL BISULFATE 75 MG/1
1 TABLET, FILM COATED ORAL
Qty: 90 | Refills: 3
Start: 2023-07-14 | End: 2024-07-07

## 2023-07-14 RX ORDER — INSULIN GLARGINE 100 [IU]/ML
9 INJECTION, SOLUTION SUBCUTANEOUS AT BEDTIME
Refills: 0 | Status: DISCONTINUED | OUTPATIENT
Start: 2023-07-14 | End: 2023-07-15

## 2023-07-14 RX ORDER — DEXTROSE 50 % IN WATER 50 %
25 SYRINGE (ML) INTRAVENOUS ONCE
Refills: 0 | Status: DISCONTINUED | OUTPATIENT
Start: 2023-07-14 | End: 2023-07-15

## 2023-07-14 RX ORDER — SODIUM CHLORIDE 9 MG/ML
250 INJECTION INTRAMUSCULAR; INTRAVENOUS; SUBCUTANEOUS ONCE
Refills: 0 | Status: COMPLETED | OUTPATIENT
Start: 2023-07-14 | End: 2023-07-14

## 2023-07-14 RX ORDER — DEXTROSE 50 % IN WATER 50 %
15 SYRINGE (ML) INTRAVENOUS ONCE
Refills: 0 | Status: DISCONTINUED | OUTPATIENT
Start: 2023-07-14 | End: 2023-07-15

## 2023-07-14 RX ORDER — CLOPIDOGREL BISULFATE 75 MG/1
75 TABLET, FILM COATED ORAL DAILY
Refills: 0 | Status: DISCONTINUED | OUTPATIENT
Start: 2023-07-14 | End: 2023-07-15

## 2023-07-14 RX ORDER — AMIODARONE HYDROCHLORIDE 400 MG/1
200 TABLET ORAL DAILY
Refills: 0 | Status: DISCONTINUED | OUTPATIENT
Start: 2023-07-14 | End: 2023-07-15

## 2023-07-14 RX ADMIN — SODIUM CHLORIDE 250 MILLILITER(S): 9 INJECTION INTRAMUSCULAR; INTRAVENOUS; SUBCUTANEOUS at 11:23

## 2023-07-14 RX ADMIN — SODIUM CHLORIDE 75 MILLILITER(S): 9 INJECTION INTRAMUSCULAR; INTRAVENOUS; SUBCUTANEOUS at 11:22

## 2023-07-14 RX ADMIN — ATORVASTATIN CALCIUM 40 MILLIGRAM(S): 80 TABLET, FILM COATED ORAL at 20:26

## 2023-07-14 RX ADMIN — Medication 50 MILLIGRAM(S): at 20:26

## 2023-07-14 RX ADMIN — Medication 100 MILLIGRAM(S): at 19:27

## 2023-07-14 RX ADMIN — PANTOPRAZOLE SODIUM 40 MILLIGRAM(S): 20 TABLET, DELAYED RELEASE ORAL at 20:47

## 2023-07-14 RX ADMIN — SODIUM CHLORIDE 100 MILLILITER(S): 9 INJECTION INTRAMUSCULAR; INTRAVENOUS; SUBCUTANEOUS at 15:59

## 2023-07-14 RX ADMIN — INSULIN GLARGINE 9 UNIT(S): 100 INJECTION, SOLUTION SUBCUTANEOUS at 21:25

## 2023-07-14 NOTE — DISCHARGE NOTE PROVIDER - NSDCMRMEDTOKEN_GEN_ALL_CORE_FT
amiodarone 200 mg oral tablet: 1 orally once a day  amLODIPine 2.5 mg oral tablet: 1 orally once a day  Anoro Ellipta 62.5 mcg-25 mcg/inh inhalation powder: 1 puff(s) inhaled once a day  aspirin 81 mg oral tablet: 1 tablet orally once a day Take for 7 days only, STOP on 7/22  atorvastatin 40 mg oral tablet: 1 tab(s) orally once a day  benzonatate 200 mg oral capsule: 1 orally 2 times a day  clopidogrel 75 mg oral tablet: 1 tab(s) orally once a day  Diovan 320 mg oral tablet: 1 tab(s) orally once a day  Eliquis 5 mg oral tablet: 1 tablet orally 2 times a day HOLD for 24 hrs, RESUME 7/15  furosemide 20 mg oral tablet: 1 orally once a day  Glucophage 1000 mg oral tablet: 1 tablet orally 2 times a day HOLD for 48 hrs, RESUME 7/17  magnesium oxide 400 mg oral capsule: 1 orally 2 times a day  metoprolol succinate 100 mg oral tablet, extended release: 1 orally once a day Morning  metoprolol succinate 50 mg oral capsule, extended release: 1 orally once a day (at bedtime)  repaglinide 2 mg oral tablet: 1 orally once a day  Victoza 18 mg/3 mL subcutaneous solution: 12 milligram(s) subcutaneously once a day

## 2023-07-14 NOTE — DISCHARGE NOTE PROVIDER - NSDCCPTREATMENT_GEN_ALL_CORE_FT
PRINCIPAL PROCEDURE  Procedure: Left heart cardiac catheterization  Findings and Treatment: Study Date:     07/14/2023   Cath Lab Report    Diagnostic Cardiologist:       Shaun Forrester MD   Referring Physician:           Arnol Garrett MD   Procedures Performed   1.    Arterial Access - Right Radial   2.    Diagnostic Coronary Angiography   3.    PCI: NAVEEN   Indications:                Positive Stress Test   CCS Class II   Conclusions:   Successful PCI with NAVEEN to the mid LAD.  The stents in the LCX and RCA  were patent.  DAPT for 6 mths

## 2023-07-14 NOTE — PATIENT PROFILE ADULT - FALL HARM RISK - UNIVERSAL INTERVENTIONS
Bed in lowest position, wheels locked, appropriate side rails in place/Call bell, personal items and telephone in reach/Instruct patient to call for assistance before getting out of bed or chair/Non-slip footwear when patient is out of bed/[place] to call system/Physically safe environment - no spills, clutter or unnecessary equipment/Purposeful Proactive Rounding/Room/bathroom lighting operational, light cord in reach

## 2023-07-14 NOTE — PATIENT PROFILE ADULT - PATIENT'S PREFERRED PRONOUN
RN cannot approve Refill Request    RN can NOT refill this medication allergy/contraindication. Last office visit: 10/8/2020 Amirah Moran MD Last Physical: 8/13/2020 Last MTM visit: Visit date not found Last visit same specialty: 10/8/2020 Amirah Moran MD.  Next visit within 3 mo: Visit date not found  Next physical within 3 mo: Visit date not found      Tomasa Godinez, [facility] Triage/Med Refill 12/12/2020    Requested Prescriptions   Pending Prescriptions Disp Refills     estradiol-norethindrone (ACTIVELLA) 1-0.5 mg per tablet [Pharmacy Med Name: Estradiol-Norethindrone Acet 1-0.5 MG Oral Tablet] 84 tablet 0     Sig: Take 1 tablet by mouth once daily       Oral Contraceptives Protocol Passed - 12/10/2020  2:06 PM        Passed - Visit with PCP or prescribing provider visit in last 12 months      Last office visit with prescriber/PCP: 10/8/2020 Amirah Moran MD OR same dept: 10/8/2020 Amirah Moran MD OR same specialty: 10/8/2020 Amirah Moran MD  Last physical: 8/13/2020 Last MTM visit: Visit date not found   Next visit within 3 mo: Visit date not found  Next physical within 3 mo: Visit date not found  Prescriber OR PCP: Amirah Moran MD  Last diagnosis associated with med order: 1. Current long-term use of postmenopausal hormone replacement therapy  - estradiol-norethindrone (ACTIVELLA) 1-0.5 mg per tablet [Pharmacy Med Name: Estradiol-Norethindrone Acet 1-0.5 MG Oral Tablet]; Take 1 tablet by mouth once daily  Dispense: 84 tablet; Refill: 0    If protocol passes may refill for 12 months if within 3 months of last provider visit (or a total of 15 months).            Hormone Replacement Therapy Refill Protocol Passed - 12/10/2020  2:06 PM        Passed - PCP or prescribing provider visit in past 12 months       Last office visit with prescriber/PCP: 10/8/2020 Amirah Moran MD OR same dept: 10/8/2020 Amirah Moran MD OR same specialty: 10/8/2020 Amirah Moran MD  Last physical: 8/13/2020 Last  MTM visit: Visit date not found     Next visit within 3 mo: Visit date not found  Next physical within 3 mo: Visit date not found  Prescriber OR PCP: Amirah Moran MD  Last diagnosis associated with med order: 1. Current long-term use of postmenopausal hormone replacement therapy  - estradiol-norethindrone (ACTIVELLA) 1-0.5 mg per tablet [Pharmacy Med Name: Estradiol-Norethindrone Acet 1-0.5 MG Oral Tablet]; Take 1 tablet by mouth once daily  Dispense: 84 tablet; Refill: 0     If protocol passes may refill for 3 months.                   Him/He

## 2023-07-14 NOTE — DISCHARGE NOTE PROVIDER - NSDCFUADDINST_GEN_ALL_CORE_FT
Wound Care: The day AFTER your procedure:  Remove bandage GENTLY, and clean using mild soap and gentle warm, water stream, pat dry.   Leave OPEN to air. YOU MAY SHOWER  DO NOT SOAK your procedure site for 1 week (no baths, no pools, no tubs)  Check your wrist or groin puncture site everyday.  A small amount of soreness and bruising is normal  ACTIVITY for the next 24 hours:  1) DO NOT DRIVE  2) DO NOT make any important decisions or sign legal documents  3) DO NOT operate heavy machinery   You may resume sexual activity in 48 hours, unless otherwise instructed by your cardiologist  If your procedure was done through the WRIST, for the NEXT 3 DAYS:  AVOID pushing pulling  or repeated movement of that hand and wrist (eg: typing, hammering)  DO NOT LIFT anything more than 5 lbs     If your procedure was done through the GROIN, for the NEXT 5 DAYS:  Limit climbing the stairs, no strenuous activities, no pushing, no pulling, no straining  Do not lift anything that is 10lbs or heavier   MEDICATION:  Take your medications as explained (see discharge paperwork). If you received a stent, you will be taking medication to KEEP YOUR STENT OPEN. DO NOT STOP THESE MEDICATIONS UNLESS DIRECTED BY A CARDIOLOGIST  If you smoke, WE RECOMMEND YOU QUIT (you may call [phone] the [facility] of Tobacco Control if you need assistance)   FOLLOW UP: Please follow up with your private cardiologist (insert name) in 2 weeks.  Please call immediately for an appointment upon discharge from the hospital.    ***CALL YOUR DOCTOR***   If you experience: chest pain, fever, chills, body aches, or severe pain, swelling, redness, heat or yellow discharge at incision site or if you experience bleeding, temperature change, numbness or excruciating pain at the procedural site  If you are unable to reach your doctor, you may contact:    Cardiology Office at [facility] at [phone]   Cardiac Short Stay Unit (CSSU) [phone]    Cardiac Recovery Suite (CRS) [phone]

## 2023-07-14 NOTE — H&P CARDIOLOGY - HISTORY OF PRESENT ILLNESS
82 year old Female [employer] with PMHx of TIA (remote), COPD, CAD with prior stents 4/2023,  HTN, HLD, Afib on Eliquis (last dose 7//23) , Tachy-Bryan syndrome s/p PPM (followed at [facility]), T2DM,  with recent hospital admission in May 2023 with lightheadedness -found to be in persistent AF with demand vent pacing. Seen by EP but refused GERARDO DCCV. Was discharged home on Amio, Metoprolol and Eliquis. After discharge patient had follow up with (new) cardiologist Dr Garrett where Pharm ST was Abnormal.    IMPRESSIONS:Probably Normal Study 6/9/23  * Myocardial Perfusion SPECT results are probably normal. * Review of raw data shows: The study is of good technical  quality, despite breast attenuation artifact. * The left ventricle was small in size. Normal myocardial  perfusion scan,with no evidence of infarction or inducible ischemia. There was a very small and mild anteroseptal  defect that appeared adjacent to RV insertion and was thus likely normal variant  * Post-stress gated wall motion analysis was performed (LVEF > 70%;LVEDV = 63 ml.), revealing normal LV function.  There were no segmental wall motion abnormalities. RV function appeared normal.    TTE 5/10/23   1. Normal left ventricular cavity size. The left ventricular wall thickness is normal. The left ventricular systolic function is normal with an ejection fraction visually estimated at 70 to 75 %. There are no regional wall motion abnormalities seen.   2. Normal right ventricular cavity size and normal systolic function.    4/4/23 [facility]  LM : Angiography shows minor irregularities.    LAD   Mid left anterior descending: There is a 50 % stenosis. Instant wave-free ratio was performed with a calculated value of 0.94.  Based on the results of this study, the lesion was judged to benon-significant and no intervention was performed.  CX   Mid circumflex: There is a 90 % stenosis. s/p NAVEEN     4/3/23 Successful PCI with NAVEEN and shockwave to the distal RCA.    She reports feeling fatigue and QUEZADA. Referred today for [facility]       82 year old Female [employer] with PMHx of TIA (remote), COPD, CAD with prior stents 4/2023,  HTN, HLD, Afib on Eliquis (last dose 7/11/23 PM) , Tachy-Bryan syndrome s/p PPM [employer]tr[employer] (followed at [facility]), CKD-Cr 1.0-1.2 baseline, T2DM ( controlled, Hgba1c unknown, pt follows Endo Dr Nadira Power),  with recent hospital admission in May 2023 with lightheadedness -found to be in persistent AF with demand vent pacing. Seen by EP but refused GERARDO DCCV. Was discharged home on Amio, Metoprolol and Eliquis. After discharge patient had follow up with (new) cardiologist Dr Garrett where Pharm ST was Abnormal.    IMPRESSIONS:Probably Normal Study 6/9/23  * Myocardial Perfusion SPECT results are probably normal. * Review of raw data shows: The study is of good technical  quality, despite breast attenuation artifact. * The left ventricle was small in size. Normal myocardial  perfusion scan,with no evidence of infarction or inducible ischemia. There was a very small and mild anteroseptal  defect that appeared adjacent to RV insertion and was thus likely normal variant  * Post-stress gated wall motion analysis was performed (LVEF > 70%;LVEDV = 63 ml.), revealing normal LV function.  There were no segmental wall motion abnormalities. RV function appeared normal.    TTE 5/10/23   1. Normal left ventricular cavity size. The left ventricular wall thickness is normal. The left ventricular systolic function is normal with an ejection fraction visually estimated at 70 to 75 %. There are no regional wall motion abnormalities seen.   2. Normal right ventricular cavity size and normal systolic function.    4/4/23 [facility]  LM : Angiography shows minor irregularities.    LAD   Mid left anterior descending: There is a 50 % stenosis. Instant wave-free ratio was performed with a calculated value of 0.94.  Based on the results of this study, the lesion was judged to benon-significant and no intervention was performed.  CX   Mid circumflex: There is a 90 % stenosis. s/p NAVEEN     4/3/23 Successful PCI with NAVEEN and shockwave to the distal RCA.    She reports feeling fatigue and QUEZADA. Referred today for [facility]

## 2023-07-14 NOTE — H&P CARDIOLOGY - NSICDXPASTSURGICALHX_GEN_ALL_CORE_FT
PAST SURGICAL HISTORY:  Breast Cyst L breast cyst removed.    Cardiac Pacemaker     History of Oophorectomy     S/P Cataract Surgery     S/P Tonsillectomy     Status Post Hernia Repair     Stented coronary artery

## 2023-07-14 NOTE — DISCHARGE NOTE PROVIDER - HOSPITAL COURSE
HPI: 82 year old Female [employer] with PMHx of TIA (remote), COPD, CAD with prior stents 4/2023,  HTN, HLD, Afib on Eliquis (last dose 7/11/23 PM) , Tachy-Bryan syndrome s/p PPM [employer]tr[employer] (followed at [facility]), CKD-Cr 1.0-1.2 baseline, T2DM ( controlled, Hgba1c unknown, pt follows Endo Dr Nadira Power),  with recent hospital admission in May 2023 with lightheadedness -found to be in persistent AF with demand vent pacing. Seen by EP but refused GERARDO DCCV. Was discharged home on Amio, Metoprolol and Eliquis. After discharge patient had follow up with (new) cardiologist Dr Garrett where Pharm ST was Abnormal.    7/14 s/p NAVEEN x1 to LAD via RRA. Patient without complaint, hemodynamically stable. Pending discharge in AM HPI: 82 year old Female [employer] with PMHx of TIA (remote), COPD, CAD with prior stents 4/2023,  HTN, HLD, Afib on Eliquis (last dose 7/11/23 PM) , Tachy-Bryan syndrome s/p PPM [employer]tr[employer] (followed at [facility]), CKD-Cr 1.0-1.2 baseline, T2DM ( controlled, Hgba1c unknown, pt follows Endo Dr Nadira Power),  with recent hospital admission in May 2023 with lightheadedness -found to be in persistent AF with demand vent pacing. Seen by EP but refused GERARDO DCCV. Was discharged home on Amio, Metoprolol and Eliquis. After discharge patient had follow up with (new) cardiologist Dr Garrett where Pharm ST was Abnormal.    7/14 s/p NAVEEN x1 to LAD via RRA. Patient without complaint, hemodynamically stable.     7/15 patient tolerated procedure well- no evidence of hematoma over right radial access site. patient is R[place]andan speaking-  used, denies pain at site or any other reportable symptoms. Patient is stable for discharge today

## 2023-07-14 NOTE — DISCHARGE NOTE PROVIDER - NSDCFUSCHEDAPPT_GEN_ALL_CORE_FT
Arnol Garrett  [facility] Physician [facility]  CARDIOLOGY [address]   Scheduled Appointment: 07/24/2023

## 2023-07-14 NOTE — DISCHARGE NOTE PROVIDER - NSDCCPCAREPLAN_GEN_ALL_CORE_FT
PRINCIPAL DISCHARGE DIAGNOSIS  Diagnosis: CAD (coronary artery disease)  Assessment and Plan of Treatment: Low salt, low fat diet.   Weight management.   Take medications as prescribed.    No smoking.  Follow up appointments with your doctor(s)  as instructed.        SECONDARY DISCHARGE DIAGNOSES  Diagnosis: HLD (hyperlipidemia)  Assessment and Plan of Treatment: Your LDL cholesterol will be less than 70mg/dL  Continue with your cholesterol medications. Eat a heart healthy diet that is low in saturated fats and salt, and includes whole grains, fruits, vegetables and lean protein; exercise regularly (consult with your physician or cardiologist first); maintain a heart healthy weight; if you smoke - quit (A resource to help you stop smoking is the [facility] – phone number [phone].). Continue to follow with your primary physician or cardiologist.

## 2023-07-14 NOTE — DISCHARGE NOTE PROVIDER - CARE PROVIDER_API CALL
Arnol Garrett  Cardiology  [address], [address]  [place] [address]  Phone: [phone]  Fax: [phone]  Follow Up Time: 2 weeks

## 2023-07-15 ENCOUNTER — TRANSCRIPTION ENCOUNTER (OUTPATIENT)
Age: 83
End: 2023-07-15

## 2023-07-15 VITALS
HEART RATE: 60 BPM | SYSTOLIC BLOOD PRESSURE: 133 MMHG | OXYGEN SATURATION: 94 % | DIASTOLIC BLOOD PRESSURE: 59 MMHG | RESPIRATION RATE: 17 BRPM | TEMPERATURE: 99 F

## 2023-07-15 LAB — GLUCOSE BLDC GLUCOMTR-MCNC: 176 MG/DL — HIGH (ref 70–99)

## 2023-07-15 PROCEDURE — 92928 PRQ TCAT PLMT NTRAC ST 1 LES: CPT

## 2023-07-15 PROCEDURE — 93454 CORONARY ARTERY ANGIO S&I: CPT | Mod: 59

## 2023-07-15 PROCEDURE — 85027 COMPLETE CBC AUTOMATED: CPT

## 2023-07-15 PROCEDURE — 80053 COMPREHEN METABOLIC PANEL: CPT

## 2023-07-15 PROCEDURE — C1769: CPT

## 2023-07-15 PROCEDURE — C9600: CPT | Mod: LD

## 2023-07-15 PROCEDURE — C1894: CPT

## 2023-07-15 PROCEDURE — C1887: CPT

## 2023-07-15 PROCEDURE — C1725: CPT

## 2023-07-15 PROCEDURE — 82962 GLUCOSE BLOOD TEST: CPT

## 2023-07-15 PROCEDURE — 93005 ELECTROCARDIOGRAM TRACING: CPT

## 2023-07-15 PROCEDURE — 99232 SBSQ HOSP IP/OBS MODERATE 35: CPT | Mod: FS

## 2023-07-15 PROCEDURE — C1874: CPT

## 2023-07-15 RX ADMIN — CLOPIDOGREL BISULFATE 75 MILLIGRAM(S): 75 TABLET, FILM COATED ORAL at 05:02

## 2023-07-15 RX ADMIN — Medication 81 MILLIGRAM(S): at 05:02

## 2023-07-15 RX ADMIN — AMLODIPINE BESYLATE 2.5 MILLIGRAM(S): 2.5 TABLET ORAL at 05:03

## 2023-07-15 RX ADMIN — Medication 2: at 07:33

## 2023-07-15 RX ADMIN — VALSARTAN 320 MILLIGRAM(S): 80 TABLET ORAL at 05:02

## 2023-07-15 RX ADMIN — Medication 100 MILLIGRAM(S): at 05:17

## 2023-07-15 RX ADMIN — AMIODARONE HYDROCHLORIDE 200 MILLIGRAM(S): 400 TABLET ORAL at 05:03

## 2023-07-15 RX ADMIN — Medication 100 MILLIGRAM(S): at 05:03

## 2023-07-15 NOTE — DISCHARGE NOTE NURSING/CASE MANAGEMENT/SOCIAL WORK - NSDCVIVACCINE_GEN_ALL_CORE_FT
Tdap; 29-Oct-2022 13:54; Herbert Aguilar (NP); [employer]; [id number] (Exp. Date: 08-Dec-2024); IntraMuscular; Deltoid Left.; 0.5 milliLiter(s); VIS (VIS Published: 09-May-2013, VIS Presented: 29-Oct-2022);

## 2023-07-15 NOTE — PROGRESS NOTE ADULT - ASSESSMENT
HPI: 82 year old Female [employer] with PMHx of TIA (remote), COPD, CAD with prior stents 4/2023,  HTN, HLD, Afib on Eliquis (last dose 7/11/23 PM) , Tachy-Bryan syndrome s/p PPM [employer]tr[employer] (followed at [facility]), CKD-Cr 1.0-1.2 baseline, T2DM ( controlled, Hgba1c unknown, pt follows Endo Dr Nadira Power),  with recent hospital admission in May 2023 with lightheadedness -found to be in persistent AF with demand vent pacing. Seen by EP but refused GERARDO DCCV. Was discharged home on Amio, Metoprolol and Eliquis. After discharge patient had follow up with (new) cardiologist Dr Garrett where Pharm ST was Abnormal.    # CAD  Right wrist stable w/o bleeding or hematoma; site soft, non tender.  Right radial pulse palpable +2.  Denies chest pain, denies right wrist/arm/hand:  pain, numbness, or tingling   cont DAPT   cont antihypertesives ( specify BB, ACE, etc.), check flow sheet and report BP/HR trend   cont statin   Keep Mg >2 K >4  f/u appt in 2 weeks post dc with oupt cardiologist      - Reviewed and reinforced with patient:  wound care instructions, activities dos and donts, medication compliance specifically antiplatelet therapy given stent/s.    - Patient aware to take DAPT  as prescribed and DO NOT STOP taking without consulting cardiologist first or STENT/s WILL CLOSE  - Reviewed and reinforced with patient:  site complications ( eg: bleeding, excruciating pain at the procedural site, large sweliing-golf ball size-  extremity numbness, tingling, temperature change), or CHEST PAIN; pt aware that if any of those occur he/she must call cardiologist IMMEDIATELY or 911 or go to nearest emergency room   - Reviewed and reinforced a heart healthy diet, Smoking Cessation  - Patient verbalizes understanding of ALL OF THE ABOVE, and gives positive feedback       [facility]  Invasive [facility]  Ext [phone]   HPI: 82 year old Female [employer] with PMHx of TIA (remote), COPD, CAD with prior stents 4/2023,  HTN, HLD, Afib on Eliquis (last dose 7/11/23 PM) , Tachy-Bryan syndrome s/p PPM [employer]tr[employer] (followed at [facility]), CKD-Cr 1.0-1.2 baseline, T2DM ( controlled, Hgba1c unknown, pt follows Endo Dr Nadira Power),  with recent hospital admission in May 2023 with lightheadedness -found to be in persistent AF with demand vent pacing. Seen by EP but refused GERARDO DCCV. Was discharged home on Amio, Metoprolol and Eliquis. After discharge patient had follow up with (new) cardiologist Dr Garrett where Pharm ST was Abnormal.    # CAD  7/14 s/p NVAEEN x1 to mLAD via RRA  Right wrist stable w/o bleeding or hematoma; site soft, non tender.  Right radial pulse palpable +2.  Denies chest pain, denies right wrist/arm/hand:  pain, numbness, or tingling   Cont DAPT- aspirin and plavix  Cont antihypertensives- amlodipine, valsartan  Cont statin- atorvastatin  Triple therapy x1 week w/ aspirin, eliquis and plavix, then drop aspirin after 1 week  Monitor telemetry  Keep Mg >2 K >4  F/u appt in 2 weeks post dc with oupt cardiologist  Anticipate discharge if site and condition remain stable in AM    # HTN  Continue amlodipine and valsartan  DASH diet    # HLD  Continue atorvastatin      - Reviewed and reinforced with patient:  wound care instructions, activities dos and donts, medication compliance specifically antiplatelet therapy given stent/s.    - Patient aware to take DAPT  as prescribed and DO NOT STOP taking without consulting cardiologist first or STENT/s WILL CLOSE  - Reviewed and reinforced with patient:  site complications ( eg: bleeding, excruciating pain at the procedural site, large sweliing-golf ball size-  extremity numbness, tingling, temperature change), or CHEST PAIN; pt aware that if any of those occur he/she must call cardiologist IMMEDIATELY or 911 or go to nearest emergency room   - Reviewed and reinforced a heart healthy diet, Smoking Cessation  - Patient verbalizes understanding of ALL OF THE ABOVE, and gives positive feedback       [facility]  Invasive Cardiology  Ext [phone]

## 2023-07-15 NOTE — DISCHARGE NOTE NURSING/CASE MANAGEMENT/SOCIAL WORK - NSDCPEFALRISK_GEN_ALL_CORE
For information on Fall & Injury Prevention, visit: [email or web address] OR  [email or web address] OR  https://www.cdc.gov/steadi/patient.html

## 2023-07-15 NOTE — DISCHARGE NOTE NURSING/CASE MANAGEMENT/SOCIAL WORK - NSDPDISTO_GEN_ALL_CORE
Seen at [place] ER Sunday night for cellulitis of r lower leg- started on antibiotic around 1600 yesterday-- pt has noticed decrease in redness and edema to leg but awoke around 0000 with itching-- not has itching and redness to abd, arm, chest and neck-n
Home

## 2023-07-15 NOTE — PROGRESS NOTE ADULT - SUBJECTIVE AND OBJECTIVE BOX
[facility] INVASIVE CARDIOLOGY- (Nidia, Kimberlee, Usama, Yoshi, Mildred, Fernanda, Michael, Manav, Eze)   CARDIAC CATH LAB, ACP TEAM   [phone]    CHIEF COMPLAINT: Patient is a 82y old  Female who presents with a chief complaint of lightheadedness    HPI: 82 year old Female [employer]oroastrianism with PMHx of TIA (remote), COPD, CAD with prior stents 4/2023,  HTN, HLD, Afib on Eliquis (last dose 7/11/23 PM) , Tachy-Bryan syndrome s/p PPM [employer]tr[employer] (followed at [facility]), CKD-Cr 1.0-1.2 baseline, T2DM ( controlled, Hgba1c unknown, pt follows Endo Dr Nadira Power),  with recent hospital admission in May 2023 with lightheadedness -found to be in persistent AF with demand vent pacing. Seen by EP but refused GERARDO DCCV. Was discharged home on Amio, Metoprolol and Eliquis. After discharge patient had follow up with (new) cardiologist Dr Garrett where Pharm ST was Abnormal.    IMPRESSIONS:Probably Normal Study 6/9/23  * Myocardial Perfusion SPECT results are probably normal. * Review of raw data shows: The study is of good technical  quality, despite breast attenuation artifact. * The left ventricle was small in size. Normal myocardial  perfusion scan,with no evidence of infarction or inducible ischemia. There was a very small and mild anteroseptal  defect that appeared adjacent to RV insertion and was thus likely normal variant  * Post-stress gated wall motion analysis was performed (LVEF > 70%;LVEDV = 63 ml.), revealing normal LV function.  There were no segmental wall motion abnormalities. RV function appeared normal.    TTE 5/10/23   1. Normal left ventricular cavity size. The left ventricular wall thickness is normal. The left ventricular systolic function is normal with an ejection fraction visually estimated at 70 to 75 %. There are no regional wall motion abnormalities seen.   2. Normal right ventricular cavity size and normal systolic function.    4/4/23 [facility]  LM : Angiography shows minor irregularities.    LAD   Mid left anterior descending: There is a 50 % stenosis. Instant wave-free ratio was performed with a calculated value of 0.94.  Based on the results of this study, the lesion was judged to benon-significant and no intervention was performed.  CX   Mid circumflex: There is a 90 % stenosis. s/p NAVEEN     4/3/23 Successful PCI with NAVEEN and shockwave to the distal RCA.    She reports feeling fatigue and QUEZADA. Referred today for [facility]      Subjective/Observations: patient seen and examined.  denies chest pain, dyspena, dizziness, palpitations, N&V, HA      Review of Systems all WNL except below indicated:    Constitutional: [ ] Fever [ ] Chills [ ] Fatigue [ ] Weight change   HEENT: [ ] Blurred vision [ ] Eye Pain [ ] Headache [ ] Runny nose [ ] Sore Throat   Respiratory: [ ] Cough [ ] Wheezing [ ] Shortness of breath  Cardiovascular: [ ] Chest Pain [ ] Palpitations [ ] QUEZADA [ ] PND [ ] Orthopnea  Gastrointestinal: [ ] Abdominal Pain [ ] Diarrhea [ ] Constipation [ ] Hemorrhoids [ ] Nausea [ ] Vomiting  Genitourinary: [ ] Nocturia [ ] Dysuria [ ] Incontinence  Extremities: [ ] Swelling [ ] Joint Pain  Neurologic: [ ] Focal deficit [ ] Paresthesias [ ] Syncope  Lymphatic: [ ] Swelling [ ] Lymphadenopathy   Skin: [ ] Rash [ ] Ecchymoses [ ] Wounds [ ] Lesions  Psychiatry: [ ] Depression [ ] Suicidal/Homicidal Ideation [ ] Anxiety [ ] Sleep Disturbances  [ ] 10 point review of systems is otherwise negative except as mentioned above            [ ]Unable to obtain      PAST MEDICAL & SURGICAL HISTORY:  Atrial Fibrillation    DM (Diabetes Mellitus)    HTN (Hypertension)    Hypercholesteremia    Bradycardia    CAD (coronary artery disease)    Kidney stones    Cardiac Pacemaker    S/P Tonsillectomy    History of Oophorectomy    Status Post Hernia Repair    S/P Cataract Surgery    Breast Cyst  L breast cyst removed.    Stented coronary artery    MEDICATIONS  (STANDING):  aMIOdarone    Tablet 200 milliGRAM(s) Oral daily  amLODIPine   Tablet 2.5 milliGRAM(s) Oral daily  aspirin enteric coated 81 milliGRAM(s) Oral daily  atorvastatin 40 milliGRAM(s) Oral at bedtime  clopidogrel Tablet 75 milliGRAM(s) Oral daily  furosemide    Tablet 20 milliGRAM(s) Oral daily  glucagon  Injectable 1 milliGRAM(s) IntraMuscular once  insulin glargine Injectable (LANTUS) 9 Unit(s) SubCutaneous at bedtime  insulin lispro (ADMELOG) corrective regimen sliding scale   SubCutaneous three times a day before meals  insulin lispro (ADMELOG) corrective regimen sliding scale   SubCutaneous at bedtime  metoprolol succinate  milliGRAM(s) Oral daily  metoprolol succinate ER 50 milliGRAM(s) Oral at bedtime  sodium chloride 0.9%. 1000 milliLiter(s) (75 mL/Hr) IV Continuous <Continuous>  sodium chloride 0.9%. 1000 milliLiter(s) (100 mL/Hr) IV Continuous <Continuous>  valsartan 320 milliGRAM(s) Oral daily    MEDICATIONS  (PRN):  dextrose Oral Gel 15 Gram(s) Oral once PRN Blood Glucose LESS THAN 70 milliGRAM(s)/deciliter    Allergies    penicillin (Unknown)    Intolerances      Vital Signs Last 24 Hrs  T(C): 36.7 (14 Jul 2023 15:25), Max: 36.8 (14 Jul 2023 10:49)  T(F): 98.1 (14 Jul 2023 15:25), Max: 98.2 (14 Jul 2023 10:49)  HR: 60 (14 Jul 2023 22:55) (60 - 61)  BP: 149/65 (14 Jul 2023 22:55) (101/84 - 168/77)  BP(mean): 89 (14 Jul 2023 22:55) (76 - 115)  RR: 17 (14 Jul 2023 22:55) (16 - 18)  SpO2: 98% (14 Jul 2023 22:55) (91% - 98%)    Parameters below as of 14 Jul 2023 22:55  Patient On (Oxygen Delivery Method): room air      I&O's Summary    Weight (kg): 66.7 (07-14 @ 10:49)    FOCUSED PHYSICAL EXAM:  Pulmonary: Non-labored, breath sounds are clear bilaterally, No wheezing, rales or rhonchi  Cardiovascular: Regular, S1 and S2, No murmurs, rubs, gallops or clicks  cath site: Right radial stable w/o bleeding or hematoma, soft, + pulses     LABS: All Labs Reviewed:                        11.4   7.69  )-----------( 207      ( 14 Jul 2023 11:05 )             34.2     14 Jul 2023 11:05    140    |  105    |  16     ----------------------------<  169    3.9     |  23     |  1.16     Ca    9.4        14 Jul 2023 11:05    TPro  7.1    /  Alb  4.4    /  TBili  0.8    /  DBili  x      /  AST  19     /  ALT  15     /  AlkPhos  86     14 Jul 2023 11:05        RESULTS:    TELE intepretation:     ECG:    ECHO:  TTE 5/10/23   1. Normal left ventricular cavity size. The left ventricular wall thickness is normal. The left ventricular systolic function is normal with an ejection fraction visually estimated at 70 to 75 %. There are no regional wall motion abnormalities seen.   2. Normal right ventricular cavity size and normal systolic function.    STRESS TEST:  IMPRESSIONS:Probably Normal Study 6/9/23  * Myocardial Perfusion SPECT results are probably normal. * Review of raw data shows: The study is of good technical  quality, despite breast attenuation artifact. * The left ventricle was small in size. Normal myocardial  perfusion scan,with no evidence of infarction or inducible ischemia. There was a very small and mild anteroseptal  defect that appeared adjacent to RV insertion and was thus likely normal variant  * Post-stress gated wall motion analysis was performed (LVEF > 70%;LVEDV = 63 ml.), revealing normal LV function.  There were no segmental wall motion abnormalities. RV function appeared normal.    CATH REPORT:  4/4/23 [facility]  LM : Angiography shows minor irregularities.    LAD   Mid left anterior descending: There is a 50 % stenosis. Instant wave-free ratio was performed with a calculated value of 0.94.  Based on the results of this study, the lesion was judged to benon-significant and no intervention was performed.  CX   Mid circumflex: There is a 90 % stenosis. s/p NAVEEN     4/3/23 Successful PCI with NAVEEN and shockwave to the distal RCA.    She reports feeling fatigue and QUEZADA. Referred today for [facility]     [facility] INVASIVE CARDIOLOGY- (Nidia, Kimberlee, Usama, Yoshi, Mildred, Fernanda, Michael, Manav, Eze)   CARDIAC CATH LAB, ACP TEAM   [phone]    CHIEF COMPLAINT: Patient is a 82y old  Female who presents with a chief complaint of lightheadedness    HPI: 82 year old Female Congregational with PMHx of TIA (remote), COPD, CAD with prior stents 4/2023,  HTN, HLD, Afib on Eliquis (last dose 7/11/23 PM) , Tachy-Bryan syndrome s/p PPM [employer] (followed at [facility]), CKD-Cr 1.0-1.2 baseline, T2DM ( controlled, Hgba1c unknown, pt follows Endo Dr Nadira Power),  with recent hospital admission in May 2023 with lightheadedness -found to be in persistent AF with demand vent pacing. Seen by EP but refused GERARDO DCCV. Was discharged home on Amio, Metoprolol and Eliquis. After discharge patient had follow up with (new) cardiologist Dr Garrett where Pharm ST was Abnormal.    IMPRESSIONS: Probably Normal Study 6/9/23  * Myocardial Perfusion SPECT results are probably normal. * Review of raw data shows: The study is of good technical  quality, despite breast attenuation artifact. * The left ventricle was small in size. Normal myocardial  perfusion scan,with no evidence of infarction or inducible ischemia. There was a very small and mild anteroseptal  defect that appeared adjacent to RV insertion and was thus likely normal variant  * Post-stress gated wall motion analysis was performed (LVEF > 70%;LVEDV = 63 ml.), revealing normal LV function.  There were no segmental wall motion abnormalities. RV function appeared normal.    TTE 5/10/23   1. Normal left ventricular cavity size. The left ventricular wall thickness is normal. The left ventricular systolic function is normal with an ejection fraction visually estimated at 70 to 75 %. There are no regional wall motion abnormalities seen.   2. Normal right ventricular cavity size and normal systolic function.    4/4/23 [facility]  LM : Angiography shows minor irregularities.    LAD   Mid left anterior descending: There is a 50 % stenosis. Instant wave-free ratio was performed with a calculated value of 0.94.  Based on the results of this study, the lesion was judged to benon-significant and no intervention was performed.  CX   Mid circumflex: There is a 90 % stenosis. s/p NAVEEN     4/3/23 Successful PCI with NAVEEN and shockwave to the distal RCA.    She reports feeling fatigue and QUEZADA. Referred today for [facility]      Subjective/Observations: patient seen and examined.  denies chest pain, dyspena, dizziness, palpitations, N&V, HA    Review of Systems all WNL except below indicated:    Constitutional: [ ] Fever [ ] Chills [ ] Fatigue [ ] Weight change   HEENT: [ ] Blurred vision [ ] Eye Pain [ ] Headache [ ] Runny nose [ ] Sore Throat   Respiratory: [ ] Cough [ ] Wheezing [ ] Shortness of breath  Cardiovascular: [ ] Chest Pain [ ] Palpitations [ ] QUEZADA [ ] PND [ ] Orthopnea  Gastrointestinal: [ ] Abdominal Pain [ ] Diarrhea [ ] Constipation [ ] Hemorrhoids [ ] Nausea [ ] Vomiting  Genitourinary: [ ] Nocturia [ ] Dysuria [ ] Incontinence  Extremities: [ ] Swelling [ ] Joint Pain  Neurologic: [ ] Focal deficit [ ] Paresthesias [ ] Syncope  Lymphatic: [ ] Swelling [ ] Lymphadenopathy   Skin: [ ] Rash [ ] Ecchymoses [ ] Wounds [ ] Lesions  Psychiatry: [ ] Depression [ ] Suicidal/Homicidal Ideation [ ] Anxiety [ ] Sleep Disturbances  [ ] 10 point review of systems is otherwise negative except as mentioned above            [ ]Unable to obtain      PAST MEDICAL & SURGICAL HISTORY:  Atrial Fibrillation    DM (Diabetes Mellitus)    HTN (Hypertension)    Hypercholesteremia    Bradycardia    CAD (coronary artery disease)    Kidney stones    Cardiac Pacemaker    S/P Tonsillectomy    History of Oophorectomy    Status Post Hernia Repair    S/P Cataract Surgery    Breast Cyst  L breast cyst removed.    Stented coronary artery      MEDICATIONS  (STANDING):  aMIOdarone    Tablet 200 milliGRAM(s) Oral daily  amLODIPine   Tablet 2.5 milliGRAM(s) Oral daily  aspirin enteric coated 81 milliGRAM(s) Oral daily  atorvastatin 40 milliGRAM(s) Oral at bedtime  clopidogrel Tablet 75 milliGRAM(s) Oral daily  furosemide    Tablet 20 milliGRAM(s) Oral daily  glucagon  Injectable 1 milliGRAM(s) IntraMuscular once  insulin glargine Injectable (LANTUS) 9 Unit(s) SubCutaneous at bedtime  insulin lispro (ADMELOG) corrective regimen sliding scale   SubCutaneous three times a day before meals  insulin lispro (ADMELOG) corrective regimen sliding scale   SubCutaneous at bedtime  metoprolol succinate  milliGRAM(s) Oral daily  metoprolol succinate ER 50 milliGRAM(s) Oral at bedtime  sodium chloride 0.9%. 1000 milliLiter(s) (75 mL/Hr) IV Continuous <Continuous>  sodium chloride 0.9%. 1000 milliLiter(s) (100 mL/Hr) IV Continuous <Continuous>  valsartan 320 milliGRAM(s) Oral daily    MEDICATIONS  (PRN):  dextrose Oral Gel 15 Gram(s) Oral once PRN Blood Glucose LESS THAN 70 milliGRAM(s)/deciliter    Allergies    penicillin (Unknown)    Intolerances      Vital Signs Last 24 Hrs  T(C): 36.7 (14 Jul 2023 15:25), Max: 36.8 (14 Jul 2023 10:49)  T(F): 98.1 (14 Jul 2023 15:25), Max: 98.2 (14 Jul 2023 10:49)  HR: 60 (14 Jul 2023 22:55) (60 - 61)  BP: 149/65 (14 Jul 2023 22:55) (101/84 - 168/77)  BP(mean): 89 (14 Jul 2023 22:55) (76 - 115)  RR: 17 (14 Jul 2023 22:55) (16 - 18)  SpO2: 98% (14 Jul 2023 22:55) (91% - 98%)    Parameters below as of 14 Jul 2023 22:55  Patient On (Oxygen Delivery Method): room air      I&O's Summary    Weight (kg): 66.7 (07-14 @ 10:49)    FOCUSED PHYSICAL EXAM:  Pulmonary: Non-labored, breath sounds are clear bilaterally, No wheezing, rales or rhonchi  Cardiovascular: Regular, S1 and S2, No murmurs, rubs, gallops or clicks  cath site: Right radial stable w/o bleeding or hematoma, soft, + pulses     LABS: All Labs Reviewed:                        11.4   7.69  )-----------( 207      ( 14 Jul 2023 11:05 )             34.2     14 Jul 2023 11:05    140    |  105    |  16     ----------------------------<  169    3.9     |  23     |  1.16     Ca    9.4        14 Jul 2023 11:05    TPro  7.1    /  Alb  4.4    /  TBili  0.8    /  DBili  x      /  AST  19     /  ALT  15     /  AlkPhos  86     14 Jul 2023 11:05        RESULTS:    TELE interpretation: Paced 60    ECG:  Ventricular Rate 60 BPM    Atrial Rate 60 BPM    P-R Interval 204 ms    QRS Duration 96 ms    Q-T Interval 464 ms    QTC Calculation(Bazett) 464 ms    R Axis 3 degrees    T Axis 83 degrees    Diagnosis Line Atrial-paced rhythm  LEFT VENTRICULAR HYPERTROPHY WITH REPOLARIZATION ABNORMALITY ( R in aVL )  ABNORMAL ECG  WHEN COMPARED WITH ECG OF 10-MAY-2023 20:38,  SIGNIFICANT CHANGES HAVE OCCURRED  new pacing   Confirmed by ROMELIA Michael Zlata ([id number]) on 7/6/2023 5:03:22 PM    ECHO:  TTE 5/10/23   1. Normal left ventricular cavity size. The left ventricular wall thickness is normal. The left ventricular systolic function is normal with an ejection fraction visually estimated at 70 to 75 %. There are no regional wall motion abnormalities seen.   2. Normal right ventricular cavity size and normal systolic function.    STRESS TEST:  IMPRESSIONS:Probably Normal Study 6/9/23  * Myocardial Perfusion SPECT results are probably normal. * Review of raw data shows: The study is of good technical  quality, despite breast attenuation artifact. * The left ventricle was small in size. Normal myocardial  perfusion scan,with no evidence of infarction or inducible ischemia. There was a very small and mild anteroseptal  defect that appeared adjacent to RV insertion and was thus likely normal variant  * Post-stress gated wall motion analysis was performed (LVEF > 70%;LVEDV = 63 ml.), revealing normal LV function.  There were no segmental wall motion abnormalities. RV function appeared normal.    CATH REPORT:  4/4/23 [facility]  LM : Angiography shows minor irregularities.    LAD   Mid left anterior descending: There is a 50 % stenosis. Instant wave-free ratio was performed with a calculated value of 0.94.  Based on the results of this study, the lesion was judged to benon-significant and no intervention was performed.  CX   Mid circumflex: There is a 90 % stenosis. s/p NAVEEN     4/3/23 Successful PCI with NAVEEN and shockwave to the distal RCA.    She reports feeling fatigue and QUEZADA. Referred today for [facility]      Study Date:     07/14/2023   Cath Lab Report    Diagnostic Cardiologist:       Shaun Forrester MD   Referring Physician:           Arnol Garrett MD     Procedures Performed   Procedures:               1.    Arterial Access - Right Radial   2.    Diagnostic Coronary Angiography   3.    PCI: NAVEEN     Indications:                Positive Stress Test   CCS Class II     Conclusions:   Successful PCI with NAVEEN to the mid LAD.  The stents in the LCX and RCA  were patent.  DAPT for 6 mths

## 2023-07-15 NOTE — DISCHARGE NOTE NURSING/CASE MANAGEMENT/SOCIAL WORK - PATIENT PORTAL LINK FT
You can access the FollowMyHealth Patient Portal offered by [facility] by registering at the following website: [email or web address]. By joining [facility]’s FollowMyHealth portal, you will also be able to view your health information using other applications (apps) compatible with our system.

## 2023-07-17 ENCOUNTER — NON-APPOINTMENT (OUTPATIENT)
Age: 83
End: 2023-07-17

## 2023-07-17 ENCOUNTER — TRANSCRIPTION ENCOUNTER (OUTPATIENT)
Age: 83
End: 2023-07-17

## 2023-07-18 ENCOUNTER — APPOINTMENT (OUTPATIENT)
Dept: CARDIOLOGY | Facility: CLINIC | Age: 83
End: 2023-07-18
Payer: MEDICARE

## 2023-07-18 ENCOUNTER — NON-APPOINTMENT (OUTPATIENT)
Age: 83
End: 2023-07-18

## 2023-07-18 ENCOUNTER — INPATIENT (INPATIENT)
Facility: HOSPITAL | Age: 83
LOS: 3 days | Discharge: HOME HEALTH | DRG: 281 | End: 2023-07-22
Attending: STUDENT IN AN ORGANIZED HEALTH CARE EDUCATION/TRAINING PROGRAM | Admitting: STUDENT IN AN ORGANIZED HEALTH CARE EDUCATION/TRAINING PROGRAM
Payer: MEDICARE

## 2023-07-18 VITALS
TEMPERATURE: 98 F | OXYGEN SATURATION: 98 % | HEART RATE: 67 BPM | WEIGHT: 146 LBS | DIASTOLIC BLOOD PRESSURE: 70 MMHG | SYSTOLIC BLOOD PRESSURE: 130 MMHG | HEIGHT: 66 IN | BODY MASS INDEX: 23.46 KG/M2

## 2023-07-18 VITALS
HEART RATE: 60 BPM | TEMPERATURE: 98 F | DIASTOLIC BLOOD PRESSURE: 85 MMHG | SYSTOLIC BLOOD PRESSURE: 192 MMHG | OXYGEN SATURATION: 98 % | HEIGHT: 66 IN | RESPIRATION RATE: 20 BRPM

## 2023-07-18 DIAGNOSIS — Z95.5 PRESENCE OF CORONARY ANGIOPLASTY IMPLANT AND GRAFT: Chronic | ICD-10-CM

## 2023-07-18 LAB
ALBUMIN SERPL ELPH-MCNC: 3.9 G/DL — SIGNIFICANT CHANGE UP (ref 3.3–5)
ALP SERPL-CCNC: 121 U/L — HIGH (ref 40–120)
ALT FLD-CCNC: 40 U/L — SIGNIFICANT CHANGE UP (ref 10–45)
ANION GAP SERPL CALC-SCNC: 14 MMOL/L — SIGNIFICANT CHANGE UP (ref 5–17)
APTT BLD: 32.5 SEC — SIGNIFICANT CHANGE UP (ref 27.5–35.5)
AST SERPL-CCNC: 38 U/L — SIGNIFICANT CHANGE UP (ref 10–40)
BASE EXCESS BLDV CALC-SCNC: -3.2 MMOL/L — LOW (ref -2–3)
BASOPHILS # BLD AUTO: 0.05 K/UL — SIGNIFICANT CHANGE UP (ref 0–0.2)
BASOPHILS NFR BLD AUTO: 1.3 % — SIGNIFICANT CHANGE UP (ref 0–2)
BILIRUB SERPL-MCNC: 0.9 MG/DL — SIGNIFICANT CHANGE UP (ref 0.2–1.2)
BUN SERPL-MCNC: 16 MG/DL — SIGNIFICANT CHANGE UP (ref 7–23)
CA-I SERPL-SCNC: 1.2 MMOL/L — SIGNIFICANT CHANGE UP (ref 1.15–1.33)
CALCIUM SERPL-MCNC: 9.5 MG/DL — SIGNIFICANT CHANGE UP (ref 8.4–10.5)
CHLORIDE BLDV-SCNC: 108 MMOL/L — SIGNIFICANT CHANGE UP (ref 96–108)
CHLORIDE SERPL-SCNC: 106 MMOL/L — SIGNIFICANT CHANGE UP (ref 96–108)
CO2 BLDV-SCNC: 23 MMOL/L — SIGNIFICANT CHANGE UP (ref 22–26)
CO2 SERPL-SCNC: 19 MMOL/L — LOW (ref 22–31)
CREAT SERPL-MCNC: 1.05 MG/DL — SIGNIFICANT CHANGE UP (ref 0.5–1.3)
D DIMER BLD IA.RAPID-MCNC: 192 NG/ML DDU — SIGNIFICANT CHANGE UP
EGFR: 53 ML/MIN/1.73M2 — LOW
EOSINOPHIL # BLD AUTO: 0.13 K/UL — SIGNIFICANT CHANGE UP (ref 0–0.5)
EOSINOPHIL NFR BLD AUTO: 3.3 % — SIGNIFICANT CHANGE UP (ref 0–6)
GAS PNL BLDV: 136 MMOL/L — SIGNIFICANT CHANGE UP (ref 136–145)
GAS PNL BLDV: SIGNIFICANT CHANGE UP
GLUCOSE BLDV-MCNC: 86 MG/DL — SIGNIFICANT CHANGE UP (ref 70–99)
GLUCOSE SERPL-MCNC: 91 MG/DL — SIGNIFICANT CHANGE UP (ref 70–99)
HCO3 BLDV-SCNC: 22 MMOL/L — SIGNIFICANT CHANGE UP (ref 22–29)
HCT VFR BLD CALC: 32 % — LOW (ref 34.5–45)
HCT VFR BLDA CALC: 31 % — LOW (ref 34.5–46.5)
HGB BLD CALC-MCNC: 10.4 G/DL — LOW (ref 11.7–16.1)
HGB BLD-MCNC: 10.4 G/DL — LOW (ref 11.5–15.5)
IMM GRANULOCYTES NFR BLD AUTO: 0.3 % — SIGNIFICANT CHANGE UP (ref 0–0.9)
INR BLD: 1.48 RATIO — HIGH (ref 0.88–1.16)
LACTATE BLDV-MCNC: 1.5 MMOL/L — SIGNIFICANT CHANGE UP (ref 0.5–2)
LYMPHOCYTES # BLD AUTO: 1.03 K/UL — SIGNIFICANT CHANGE UP (ref 1–3.3)
LYMPHOCYTES # BLD AUTO: 25.8 % — SIGNIFICANT CHANGE UP (ref 13–44)
MCHC RBC-ENTMCNC: 27.6 PG — SIGNIFICANT CHANGE UP (ref 27–34)
MCHC RBC-ENTMCNC: 32.5 GM/DL — SIGNIFICANT CHANGE UP (ref 32–36)
MCV RBC AUTO: 84.9 FL — SIGNIFICANT CHANGE UP (ref 80–100)
MONOCYTES # BLD AUTO: 0.54 K/UL — SIGNIFICANT CHANGE UP (ref 0–0.9)
MONOCYTES NFR BLD AUTO: 13.5 % — SIGNIFICANT CHANGE UP (ref 2–14)
NEUTROPHILS # BLD AUTO: 2.23 K/UL — SIGNIFICANT CHANGE UP (ref 1.8–7.4)
NEUTROPHILS NFR BLD AUTO: 55.8 % — SIGNIFICANT CHANGE UP (ref 43–77)
NRBC # BLD: 0 /100 WBCS — SIGNIFICANT CHANGE UP (ref 0–0)
NT-PROBNP SERPL-SCNC: 4327 PG/ML — HIGH (ref 0–300)
PCO2 BLDV: 39 MMHG — SIGNIFICANT CHANGE UP (ref 39–42)
PH BLDV: 7.36 — SIGNIFICANT CHANGE UP (ref 7.32–7.43)
PLATELET # BLD AUTO: 272 K/UL — SIGNIFICANT CHANGE UP (ref 150–400)
PO2 BLDV: 26 MMHG — SIGNIFICANT CHANGE UP (ref 25–45)
POTASSIUM BLDV-SCNC: 4.1 MMOL/L — SIGNIFICANT CHANGE UP (ref 3.5–5.1)
POTASSIUM SERPL-MCNC: 4.7 MMOL/L — SIGNIFICANT CHANGE UP (ref 3.5–5.3)
POTASSIUM SERPL-SCNC: 4.7 MMOL/L — SIGNIFICANT CHANGE UP (ref 3.5–5.3)
PROT SERPL-MCNC: 7.2 G/DL — SIGNIFICANT CHANGE UP (ref 6–8.3)
PROTHROM AB SERPL-ACNC: 17.1 SEC — HIGH (ref 10.5–13.4)
RBC # BLD: 3.77 M/UL — LOW (ref 3.8–5.2)
RBC # FLD: 12.7 % — SIGNIFICANT CHANGE UP (ref 10.3–14.5)
SAO2 % BLDV: 35.9 % — LOW (ref 67–88)
SODIUM SERPL-SCNC: 139 MMOL/L — SIGNIFICANT CHANGE UP (ref 135–145)
TROPONIN T, HIGH SENSITIVITY RESULT: 150 NG/L — HIGH (ref 0–51)
WBC # BLD: 3.99 K/UL — SIGNIFICANT CHANGE UP (ref 3.8–10.5)
WBC # FLD AUTO: 3.99 K/UL — SIGNIFICANT CHANGE UP (ref 3.8–10.5)

## 2023-07-18 PROCEDURE — 93000 ELECTROCARDIOGRAM COMPLETE: CPT | Mod: PD

## 2023-07-18 PROCEDURE — 99285 EMERGENCY DEPT VISIT HI MDM: CPT | Mod: FS,GC

## 2023-07-18 PROCEDURE — 99214 OFFICE O/P EST MOD 30 MIN: CPT

## 2023-07-18 PROCEDURE — 71045 X-RAY EXAM CHEST 1 VIEW: CPT | Mod: 26

## 2023-07-18 RX ORDER — HEPARIN SODIUM 5000 [USP'U]/ML
2500 INJECTION INTRAVENOUS; SUBCUTANEOUS EVERY 6 HOURS
Refills: 0 | Status: DISCONTINUED | OUTPATIENT
Start: 2023-07-18 | End: 2023-07-19

## 2023-07-18 RX ORDER — HEPARIN SODIUM 5000 [USP'U]/ML
5000 INJECTION INTRAVENOUS; SUBCUTANEOUS EVERY 6 HOURS
Refills: 0 | Status: DISCONTINUED | OUTPATIENT
Start: 2023-07-18 | End: 2023-07-19

## 2023-07-18 RX ORDER — HEPARIN SODIUM 5000 [USP'U]/ML
INJECTION INTRAVENOUS; SUBCUTANEOUS
Qty: 25000 | Refills: 0 | Status: DISCONTINUED | OUTPATIENT
Start: 2023-07-18 | End: 2023-07-19

## 2023-07-18 NOTE — ED PROVIDER NOTE - NSICDXPASTMEDICALHX_GEN_ALL_CORE_FT
PAST MEDICAL HISTORY:  Atrial Fibrillation     Bradycardia     CAD (coronary artery disease)     DM (Diabetes Mellitus)     HTN (Hypertension)     Hypercholesteremia     Kidney stones

## 2023-07-18 NOTE — ED PROVIDER NOTE - PHYSICAL EXAMINATION
Patient is well appearing elderly female, awake, alert and oriented x 3. NCAT, PERRLA, EOMI, Neck is supple, No LAD, scattered bibasilar rales, no respiratory distress. +S1S2 +murmur. Abdomen:Soft nd/nt+bs no rebound or guarding. Moving all extremities FROM, no edema or calf tenderness. Palpable radial and pedal pulses. Skin with no rash.

## 2023-07-18 NOTE — ED PROVIDER NOTE - RAPID ASSESSMENT
Private Physician Kelvin  82 year old Female Taoism w pmh TIA, COPD, CAD w stents, HTN, HLD, Afib on Eliquis, Tachy-Bryan syndrome s/p PPM [employer] (followed at [facility]), CKD-Cr 1.0-1.2 baseline, T2DM. PT was recently admitted [facility] SP PCI w stent 7/14/23. Now comes to ed c/o  CP/SOB worsening since Cath. PE WDWD Female awake alert looking well. Chest clear anterior & posterior cv no rubs, gallops or murmurs. Neruo gcs 15 speech fluent  power 5/5 all ext  Sandro Medrano MD, Facep      PT seen as Qdoc/triage, full evaluation to be performed once pt is transferred to main ED  Sandro Medrano MD, Facep 125

## 2023-07-18 NOTE — ED PROCEDURE NOTE - PROCEDURE ADDITIONAL DETAILS
Emergency Department Focused Ultrasound (lung) performed at patient's bedside.  The complete report can be found in PACS. 3+ B lines throughout bilateral lower lung fields and left upper field. pt made aware. followup study is CXR, possible CTA chest

## 2023-07-18 NOTE — ED PROVIDER NOTE - IV ALTEPLASE EXCL REL HIDDEN
Ms. Andre presents to clinic for follow-up.  She was last seen on 2/1/21 by Dr. Ramos.  We are following her for sluggish gastric emptying along with functional dyspepsia.  She had an EGD on January 22, 2021.  This showed a small hiatal hernia.  Gastritis was noted.  Biopsies were negative for H. pylori.  Patient still is having breakthrough heartburn despite taking her Nexium.  She admits having some pickle juice as well as some citrus.  She states pickle juice did not really aggravate her symptoms but the  citrus certainly did. Today she returns to the office reporting that her nausea is controlled with as needed Phenergan and that her dicyclomine recently prescribed for lower abdominal cramping is helping.  However with this discomfort she was having pain radiating to the back and they are suspicious for kidney stone.  She reports that she is soon to have imaging later today to rule out kidney stone.  She does not drink water, stating that this causes her nausea.  She typically drinks body armor drinks throughout the day.  No vomiting or dysphagia but occasionally some throat irritation, per patient.  She is taking lansoprazole once daily which she says is the only thing that helps her heartburn.  She has not had any weight loss since her last visit.  Recall she had a history of cholecystectomy.  She tells me she has had a colonoscopy in the past but we have no record of this.  Rarely does she have any bleeding with bowel movements but is currently on antibiotic for infection and states that she would rather have loose stools then be constipated.  Once again, she remarks that tramadol is the only thing that has helped her gastroparesis in the past.  We have not given this to her but instead have recommended alternative therapy.  She had adverse events of tremor with Reglan and believes she may have taken erythromycin in the past.  She has never been on bethanechol.  She has a patch for nausea along with her as needed Phenergan.  Denies any regular use of NSAIDs.  No change in diet, stated she eats a healthy and balanced diet. 
show

## 2023-07-18 NOTE — ED PROVIDER NOTE - CLINICAL SUMMARY MEDICAL DECISION MAKING FREE TEXT BOX
82y f with complex PMHx including recent cardiac stenting s/p DC 3 days ago p/w worsening SOB, QUEZADA and edema. HTNsive normotensive afebrile. DDx r/o ACS, acute/chronic CHF exacerbation, PNA, PTX, PE. Will check ekg, tele, labs, troponin, bnp, CXR, if no obvious pulm edema/PNA may pursue CTA to r/o PE - Carson Mancilla PA-C

## 2023-07-18 NOTE — ED PROVIDER NOTE - PROGRESS NOTE DETAILS
Discussed with Dr Kelvin calderon. readmit for cath, Card cs Dr Maki Medrano MD, Facep spoke with Dr Gambino - will r/o PE given chest pain/SOB after admission, pt intermittently held her AC. likely tba to medicine service to toma - Carson Mancilla PA-C troponin elevated 150 with moderate hemolysis which indicates falsely low. pt denies CP/SOB at this time. discussed with Dr. Gambino, could be secondary to myocardial manipulation from recent PCI but will tx with heparin gtt, trend trop. pending CTA chest - Carson Mancilla PA-C troponin elevated 150 with moderate hemolysis which indicates falsely low. pt denies CP/SOB at this time. discussed with Dr. Gambino, could be secondary to myocardial manipulation from recent PCI but will tx with heparin gtt, trend trop. pending dimer- Carson Mancilla PA-C

## 2023-07-18 NOTE — ED PROVIDER NOTE - OBJECTIVE STATEMENT
82 year old Female Gnosticist w pmh TIA, COPD, CAD w stents, HTN, HLD, Afib on Eliquis, Tachy-Bryan syndrome s/p PPM [employer] (followed at [facility]), CKD-Cr 1.0-1.2 baseline, T2DM presents with 3 days of persistent but not worsening intermittent left-sided chest discomfort and dyspnea on exertion while walking in the house.  Also reports 3 days of cough productive of clear sputum.  Spoke to her primary Dr. Garrett who sent her in for evaluation. .PT was recently admitted [facility] SP PCI w stent 7/14/23.  Denies fever, chills, abdominal pain, nausea, vomiting, palpitations, dizziness, LOC, peripheral edema

## 2023-07-18 NOTE — ED PROVIDER NOTE - NS ED ATTENDING STATEMENT MOD
This was a shared visit with the KANWAL. I reviewed and verified the documentation and independently performed the documented:

## 2023-07-18 NOTE — ED ADULT NURSE NOTE - OBJECTIVE STATEMENT
82YF [facility] A&OX4 Ambulatory PMHX of COPD, CAD (stentsx3), HLD, HTN, Afib (Eliquis, Plavix, and Aspirin), Tachy-Bryan syndrome s/p PPM [employer], CKD, DM2 presents to the ED c/o CP at Dr. Garrett's office today while getting a BP reading. pt was there for f/u s/p stent 07/14/2023. at the cath lab, pt was having an MI and had nitro given 82YF [employer] A&OX4 Ambulatory PMHX of COPD, CAD (stentsx3), HLD, HTN, Afib (Eliquis, Plavix, and Aspirin), Tachy-Bryan syndrome s/p PPM [employer], CKD, DM2 presents to the ED c/o CP at Dr. Garrett's office today while getting a BP reading. pt was there for f/u s/p stent 07/14/2023. at the cath lab, pt was having an MI and nitroglycerin was given and pt has been having episodes of intermittent CP and SOB. pt denies f/c/n/v/d.

## 2023-07-18 NOTE — ED PROVIDER NOTE - ATTENDING APP SHARED VISIT CONTRIBUTION OF CARE
82F hx CAD w/ stents, A-fib on Eliquis, HTN, HLD, tachy-morgan syndrome S/P PPM, COPD, TIA, CKD, presents with SOB. Had recent cardiac stent placement on 7/14/23. No chest pain. Appears well, no acute distress. Initial ECG no STEMI. Will obtain labs, CXR. -Renetta Jennings MD (Attending)

## 2023-07-18 NOTE — ED ADULT NURSE NOTE - NSFALLHARMRISKINTERV_ED_ALL_ED

## 2023-07-19 DIAGNOSIS — I48.91 UNSPECIFIED ATRIAL FIBRILLATION: ICD-10-CM

## 2023-07-19 DIAGNOSIS — I24.9 ACUTE ISCHEMIC HEART DISEASE, UNSPECIFIED: ICD-10-CM

## 2023-07-19 DIAGNOSIS — D64.9 ANEMIA, UNSPECIFIED: ICD-10-CM

## 2023-07-19 DIAGNOSIS — E78.5 HYPERLIPIDEMIA, UNSPECIFIED: ICD-10-CM

## 2023-07-19 DIAGNOSIS — J44.9 CHRONIC OBSTRUCTIVE PULMONARY DISEASE, UNSPECIFIED: ICD-10-CM

## 2023-07-19 DIAGNOSIS — I10 ESSENTIAL (PRIMARY) HYPERTENSION: ICD-10-CM

## 2023-07-19 DIAGNOSIS — E11.9 TYPE 2 DIABETES MELLITUS WITHOUT COMPLICATIONS: ICD-10-CM

## 2023-07-19 DIAGNOSIS — I21.4 NON-ST ELEVATION (NSTEMI) MYOCARDIAL INFARCTION: ICD-10-CM

## 2023-07-19 LAB
ALBUMIN SERPL ELPH-MCNC: 3.9 G/DL — SIGNIFICANT CHANGE UP (ref 3.3–5)
ALBUMIN SERPL ELPH-MCNC: 4.1 G/DL — SIGNIFICANT CHANGE UP (ref 3.3–5)
ALP SERPL-CCNC: 115 U/L — SIGNIFICANT CHANGE UP (ref 40–120)
ALP SERPL-CCNC: 117 U/L — SIGNIFICANT CHANGE UP (ref 40–120)
ALT FLD-CCNC: 35 U/L — SIGNIFICANT CHANGE UP (ref 10–45)
ALT FLD-CCNC: 43 U/L — SIGNIFICANT CHANGE UP (ref 10–45)
ANION GAP SERPL CALC-SCNC: 12 MMOL/L — SIGNIFICANT CHANGE UP (ref 5–17)
ANION GAP SERPL CALC-SCNC: 14 MMOL/L — SIGNIFICANT CHANGE UP (ref 5–17)
APPEARANCE UR: CLEAR — SIGNIFICANT CHANGE UP
APTT BLD: >200 SEC — CRITICAL HIGH (ref 27.5–35.5)
AST SERPL-CCNC: 29 U/L — SIGNIFICANT CHANGE UP (ref 10–40)
AST SERPL-CCNC: 47 U/L — HIGH (ref 10–40)
BACTERIA # UR AUTO: NEGATIVE — SIGNIFICANT CHANGE UP
BILIRUB SERPL-MCNC: 0.9 MG/DL — SIGNIFICANT CHANGE UP (ref 0.2–1.2)
BILIRUB SERPL-MCNC: 0.9 MG/DL — SIGNIFICANT CHANGE UP (ref 0.2–1.2)
BILIRUB UR-MCNC: NEGATIVE — SIGNIFICANT CHANGE UP
BUN SERPL-MCNC: 14 MG/DL — SIGNIFICANT CHANGE UP (ref 7–23)
BUN SERPL-MCNC: 15 MG/DL — SIGNIFICANT CHANGE UP (ref 7–23)
CALCIUM SERPL-MCNC: 9.3 MG/DL — SIGNIFICANT CHANGE UP (ref 8.4–10.5)
CALCIUM SERPL-MCNC: 9.6 MG/DL — SIGNIFICANT CHANGE UP (ref 8.4–10.5)
CHLORIDE SERPL-SCNC: 106 MMOL/L — SIGNIFICANT CHANGE UP (ref 96–108)
CHLORIDE SERPL-SCNC: 106 MMOL/L — SIGNIFICANT CHANGE UP (ref 96–108)
CO2 SERPL-SCNC: 20 MMOL/L — LOW (ref 22–31)
CO2 SERPL-SCNC: 21 MMOL/L — LOW (ref 22–31)
COLOR SPEC: COLORLESS — SIGNIFICANT CHANGE UP
CREAT SERPL-MCNC: 1.03 MG/DL — SIGNIFICANT CHANGE UP (ref 0.5–1.3)
CREAT SERPL-MCNC: 1.04 MG/DL — SIGNIFICANT CHANGE UP (ref 0.5–1.3)
D DIMER BLD IA.RAPID-MCNC: <150 NG/ML DDU — SIGNIFICANT CHANGE UP
DIFF PNL FLD: ABNORMAL
EGFR: 54 ML/MIN/1.73M2 — LOW
EGFR: 55 ML/MIN/1.73M2 — LOW
EPI CELLS # UR: 2 /HPF — SIGNIFICANT CHANGE UP
FERRITIN SERPL-MCNC: 112 NG/ML — SIGNIFICANT CHANGE UP (ref 13–330)
GLUCOSE BLDC GLUCOMTR-MCNC: 136 MG/DL — HIGH (ref 70–99)
GLUCOSE BLDC GLUCOMTR-MCNC: 184 MG/DL — HIGH (ref 70–99)
GLUCOSE BLDC GLUCOMTR-MCNC: 222 MG/DL — HIGH (ref 70–99)
GLUCOSE BLDC GLUCOMTR-MCNC: 78 MG/DL — SIGNIFICANT CHANGE UP (ref 70–99)
GLUCOSE BLDC GLUCOMTR-MCNC: 93 MG/DL — SIGNIFICANT CHANGE UP (ref 70–99)
GLUCOSE SERPL-MCNC: 84 MG/DL — SIGNIFICANT CHANGE UP (ref 70–99)
GLUCOSE SERPL-MCNC: 84 MG/DL — SIGNIFICANT CHANGE UP (ref 70–99)
GLUCOSE UR QL: NEGATIVE — SIGNIFICANT CHANGE UP
HCT VFR BLD CALC: 30.3 % — LOW (ref 34.5–45)
HGB BLD-MCNC: 10.4 G/DL — LOW (ref 11.5–15.5)
INR BLD: 1.49 RATIO — HIGH (ref 0.88–1.16)
IRON SATN MFR SERPL: 10 % — LOW (ref 14–50)
IRON SATN MFR SERPL: 33 UG/DL — SIGNIFICANT CHANGE UP (ref 30–160)
KETONES UR-MCNC: NEGATIVE — SIGNIFICANT CHANGE UP
LEUKOCYTE ESTERASE UR-ACNC: ABNORMAL
MCHC RBC-ENTMCNC: 28.7 PG — SIGNIFICANT CHANGE UP (ref 27–34)
MCHC RBC-ENTMCNC: 34.3 GM/DL — SIGNIFICANT CHANGE UP (ref 32–36)
MCV RBC AUTO: 83.7 FL — SIGNIFICANT CHANGE UP (ref 80–100)
NITRITE UR-MCNC: NEGATIVE — SIGNIFICANT CHANGE UP
NRBC # BLD: 0 /100 WBCS — SIGNIFICANT CHANGE UP (ref 0–0)
PH UR: 6.5 — SIGNIFICANT CHANGE UP (ref 5–8)
PLATELET # BLD AUTO: 234 K/UL — SIGNIFICANT CHANGE UP (ref 150–400)
POTASSIUM SERPL-MCNC: 4.3 MMOL/L — SIGNIFICANT CHANGE UP (ref 3.5–5.3)
POTASSIUM SERPL-MCNC: 5.6 MMOL/L — HIGH (ref 3.5–5.3)
POTASSIUM SERPL-SCNC: 4.3 MMOL/L — SIGNIFICANT CHANGE UP (ref 3.5–5.3)
POTASSIUM SERPL-SCNC: 5.6 MMOL/L — HIGH (ref 3.5–5.3)
PROT SERPL-MCNC: 6.9 G/DL — SIGNIFICANT CHANGE UP (ref 6–8.3)
PROT SERPL-MCNC: 7.4 G/DL — SIGNIFICANT CHANGE UP (ref 6–8.3)
PROT UR-MCNC: NEGATIVE — SIGNIFICANT CHANGE UP
PROTHROM AB SERPL-ACNC: 17.4 SEC — HIGH (ref 10.5–13.4)
RAPID RVP RESULT: SIGNIFICANT CHANGE UP
RBC # BLD: 3.62 M/UL — LOW (ref 3.8–5.2)
RBC # FLD: 12.7 % — SIGNIFICANT CHANGE UP (ref 10.3–14.5)
RBC CASTS # UR COMP ASSIST: 4 /HPF — SIGNIFICANT CHANGE UP (ref 0–4)
SARS-COV-2 RNA SPEC QL NAA+PROBE: SIGNIFICANT CHANGE UP
SODIUM SERPL-SCNC: 139 MMOL/L — SIGNIFICANT CHANGE UP (ref 135–145)
SODIUM SERPL-SCNC: 140 MMOL/L — SIGNIFICANT CHANGE UP (ref 135–145)
SP GR SPEC: 1 — LOW (ref 1.01–1.02)
TIBC SERPL-MCNC: 341 UG/DL — SIGNIFICANT CHANGE UP (ref 220–430)
TRANSFERRIN SERPL-MCNC: 223 MG/DL — SIGNIFICANT CHANGE UP (ref 200–360)
TROPONIN T, HIGH SENSITIVITY RESULT: 139 NG/L — HIGH (ref 0–51)
TROPONIN T, HIGH SENSITIVITY RESULT: 147 NG/L — HIGH (ref 0–51)
UIBC SERPL-MCNC: 308 UG/DL — SIGNIFICANT CHANGE UP (ref 110–370)
UROBILINOGEN FLD QL: NEGATIVE — SIGNIFICANT CHANGE UP
WBC # BLD: 4.3 K/UL — SIGNIFICANT CHANGE UP (ref 3.8–10.5)
WBC # FLD AUTO: 4.3 K/UL — SIGNIFICANT CHANGE UP (ref 3.8–10.5)
WBC UR QL: 4 /HPF — SIGNIFICANT CHANGE UP (ref 0–5)

## 2023-07-19 PROCEDURE — 99223 1ST HOSP IP/OBS HIGH 75: CPT

## 2023-07-19 RX ORDER — HEPARIN SODIUM 5000 [USP'U]/ML
INJECTION INTRAVENOUS; SUBCUTANEOUS
Qty: 25000 | Refills: 0 | Status: DISCONTINUED | OUTPATIENT
Start: 2023-07-19 | End: 2023-07-20

## 2023-07-19 RX ORDER — BUDESONIDE AND FORMOTEROL FUMARATE DIHYDRATE 160; 4.5 UG/1; UG/1
2 AEROSOL RESPIRATORY (INHALATION)
Refills: 0 | DISCHARGE

## 2023-07-19 RX ORDER — UMECLIDINIUM BROMIDE AND VILANTEROL TRIFENATATE 62.5; 25 UG/1; UG/1
1 POWDER RESPIRATORY (INHALATION)
Refills: 0 | DISCHARGE

## 2023-07-19 RX ORDER — DEXTROSE 50 % IN WATER 50 %
25 SYRINGE (ML) INTRAVENOUS ONCE
Refills: 0 | Status: DISCONTINUED | OUTPATIENT
Start: 2023-07-19 | End: 2023-07-22

## 2023-07-19 RX ORDER — AMLODIPINE BESYLATE 2.5 MG/1
2.5 TABLET ORAL ONCE
Refills: 0 | Status: COMPLETED | OUTPATIENT
Start: 2023-07-19 | End: 2023-07-19

## 2023-07-19 RX ORDER — BUDESONIDE AND FORMOTEROL FUMARATE DIHYDRATE 160; 4.5 UG/1; UG/1
2 AEROSOL RESPIRATORY (INHALATION)
Refills: 0 | Status: DISCONTINUED | OUTPATIENT
Start: 2023-07-19 | End: 2023-07-22

## 2023-07-19 RX ORDER — GLUCAGON INJECTION, SOLUTION 0.5 MG/.1ML
1 INJECTION, SOLUTION SUBCUTANEOUS ONCE
Refills: 0 | Status: DISCONTINUED | OUTPATIENT
Start: 2023-07-19 | End: 2023-07-22

## 2023-07-19 RX ORDER — INSULIN LISPRO 100/ML
VIAL (ML) SUBCUTANEOUS
Refills: 0 | Status: DISCONTINUED | OUTPATIENT
Start: 2023-07-19 | End: 2023-07-22

## 2023-07-19 RX ORDER — SODIUM CHLORIDE 9 MG/ML
1000 INJECTION, SOLUTION INTRAVENOUS
Refills: 0 | Status: DISCONTINUED | OUTPATIENT
Start: 2023-07-19 | End: 2023-07-22

## 2023-07-19 RX ORDER — HEPARIN SODIUM 5000 [USP'U]/ML
5000 INJECTION INTRAVENOUS; SUBCUTANEOUS ONCE
Refills: 0 | Status: COMPLETED | OUTPATIENT
Start: 2023-07-19 | End: 2023-07-19

## 2023-07-19 RX ORDER — CLOPIDOGREL BISULFATE 75 MG/1
75 TABLET, FILM COATED ORAL DAILY
Refills: 0 | Status: DISCONTINUED | OUTPATIENT
Start: 2023-07-19 | End: 2023-07-22

## 2023-07-19 RX ORDER — MAGNESIUM OXIDE 400 MG ORAL TABLET 241.3 MG
400 TABLET ORAL DAILY
Refills: 0 | Status: DISCONTINUED | OUTPATIENT
Start: 2023-07-19 | End: 2023-07-22

## 2023-07-19 RX ORDER — NITROGLYCERIN 6.5 MG
0.4 CAPSULE, EXTENDED RELEASE ORAL
Refills: 0 | Status: DISCONTINUED | OUTPATIENT
Start: 2023-07-19 | End: 2023-07-22

## 2023-07-19 RX ORDER — INSULIN LISPRO 100/ML
3 VIAL (ML) SUBCUTANEOUS
Refills: 0 | Status: DISCONTINUED | OUTPATIENT
Start: 2023-07-19 | End: 2023-07-22

## 2023-07-19 RX ORDER — AMLODIPINE BESYLATE 2.5 MG/1
5 TABLET ORAL ONCE
Refills: 0 | Status: COMPLETED | OUTPATIENT
Start: 2023-07-19 | End: 2023-07-19

## 2023-07-19 RX ORDER — ATORVASTATIN CALCIUM 80 MG/1
40 TABLET, FILM COATED ORAL AT BEDTIME
Refills: 0 | Status: DISCONTINUED | OUTPATIENT
Start: 2023-07-19 | End: 2023-07-22

## 2023-07-19 RX ORDER — VALSARTAN 80 MG/1
1 TABLET ORAL
Qty: 0 | Refills: 0 | DISCHARGE

## 2023-07-19 RX ORDER — AMIODARONE HYDROCHLORIDE 400 MG/1
200 TABLET ORAL DAILY
Refills: 0 | Status: DISCONTINUED | OUTPATIENT
Start: 2023-07-19 | End: 2023-07-22

## 2023-07-19 RX ORDER — HEPARIN SODIUM 5000 [USP'U]/ML
2500 INJECTION INTRAVENOUS; SUBCUTANEOUS EVERY 6 HOURS
Refills: 0 | Status: DISCONTINUED | OUTPATIENT
Start: 2023-07-19 | End: 2023-07-19

## 2023-07-19 RX ORDER — ASPIRIN/CALCIUM CARB/MAGNESIUM 324 MG
81 TABLET ORAL DAILY
Refills: 0 | Status: DISCONTINUED | OUTPATIENT
Start: 2023-07-19 | End: 2023-07-22

## 2023-07-19 RX ORDER — HEPARIN SODIUM 5000 [USP'U]/ML
INJECTION INTRAVENOUS; SUBCUTANEOUS
Qty: 25000 | Refills: 0 | Status: DISCONTINUED | OUTPATIENT
Start: 2023-07-19 | End: 2023-07-19

## 2023-07-19 RX ORDER — DEXTROSE 50 % IN WATER 50 %
15 SYRINGE (ML) INTRAVENOUS ONCE
Refills: 0 | Status: DISCONTINUED | OUTPATIENT
Start: 2023-07-19 | End: 2023-07-22

## 2023-07-19 RX ORDER — AMLODIPINE BESYLATE 2.5 MG/1
10 TABLET ORAL DAILY
Refills: 0 | Status: DISCONTINUED | OUTPATIENT
Start: 2023-07-20 | End: 2023-07-22

## 2023-07-19 RX ORDER — HEPARIN SODIUM 5000 [USP'U]/ML
3800 INJECTION INTRAVENOUS; SUBCUTANEOUS EVERY 6 HOURS
Refills: 0 | Status: DISCONTINUED | OUTPATIENT
Start: 2023-07-19 | End: 2023-07-20

## 2023-07-19 RX ORDER — HEPARIN SODIUM 5000 [USP'U]/ML
5000 INJECTION INTRAVENOUS; SUBCUTANEOUS EVERY 6 HOURS
Refills: 0 | Status: DISCONTINUED | OUTPATIENT
Start: 2023-07-19 | End: 2023-07-19

## 2023-07-19 RX ORDER — METOPROLOL TARTRATE 50 MG
100 TABLET ORAL DAILY
Refills: 0 | Status: DISCONTINUED | OUTPATIENT
Start: 2023-07-19 | End: 2023-07-22

## 2023-07-19 RX ORDER — DEXTROSE 50 % IN WATER 50 %
12.5 SYRINGE (ML) INTRAVENOUS ONCE
Refills: 0 | Status: DISCONTINUED | OUTPATIENT
Start: 2023-07-19 | End: 2023-07-22

## 2023-07-19 RX ORDER — METOPROLOL TARTRATE 50 MG
50 TABLET ORAL AT BEDTIME
Refills: 0 | Status: DISCONTINUED | OUTPATIENT
Start: 2023-07-19 | End: 2023-07-22

## 2023-07-19 RX ORDER — LIRAGLUTIDE 6 MG/ML
12 INJECTION SUBCUTANEOUS
Refills: 0 | DISCHARGE

## 2023-07-19 RX ORDER — INSULIN GLARGINE 100 [IU]/ML
9 INJECTION, SOLUTION SUBCUTANEOUS AT BEDTIME
Refills: 0 | Status: DISCONTINUED | OUTPATIENT
Start: 2023-07-19 | End: 2023-07-22

## 2023-07-19 RX ORDER — AMLODIPINE BESYLATE 2.5 MG/1
2.5 TABLET ORAL DAILY
Refills: 0 | Status: DISCONTINUED | OUTPATIENT
Start: 2023-07-19 | End: 2023-07-19

## 2023-07-19 RX ORDER — VALSARTAN 80 MG/1
320 TABLET ORAL DAILY
Refills: 0 | Status: DISCONTINUED | OUTPATIENT
Start: 2023-07-19 | End: 2023-07-22

## 2023-07-19 RX ORDER — ATORVASTATIN CALCIUM 80 MG/1
1 TABLET, FILM COATED ORAL
Qty: 0 | Refills: 0 | DISCHARGE

## 2023-07-19 RX ADMIN — AMLODIPINE BESYLATE 2.5 MILLIGRAM(S): 2.5 TABLET ORAL at 02:38

## 2023-07-19 RX ADMIN — AMIODARONE HYDROCHLORIDE 200 MILLIGRAM(S): 400 TABLET ORAL at 08:50

## 2023-07-19 RX ADMIN — VALSARTAN 320 MILLIGRAM(S): 80 TABLET ORAL at 06:19

## 2023-07-19 RX ADMIN — ATORVASTATIN CALCIUM 40 MILLIGRAM(S): 80 TABLET, FILM COATED ORAL at 22:10

## 2023-07-19 RX ADMIN — HEPARIN SODIUM 1100 UNIT(S)/HR: 5000 INJECTION INTRAVENOUS; SUBCUTANEOUS at 01:17

## 2023-07-19 RX ADMIN — Medication 3 UNIT(S): at 11:50

## 2023-07-19 RX ADMIN — BUDESONIDE AND FORMOTEROL FUMARATE DIHYDRATE 2 PUFF(S): 160; 4.5 AEROSOL RESPIRATORY (INHALATION) at 06:19

## 2023-07-19 RX ADMIN — HEPARIN SODIUM 750 UNIT(S)/HR: 5000 INJECTION INTRAVENOUS; SUBCUTANEOUS at 07:25

## 2023-07-19 RX ADMIN — AMLODIPINE BESYLATE 5 MILLIGRAM(S): 2.5 TABLET ORAL at 11:03

## 2023-07-19 RX ADMIN — Medication 3 UNIT(S): at 18:03

## 2023-07-19 RX ADMIN — AMLODIPINE BESYLATE 2.5 MILLIGRAM(S): 2.5 TABLET ORAL at 08:50

## 2023-07-19 RX ADMIN — Medication 100 MILLIGRAM(S): at 06:19

## 2023-07-19 RX ADMIN — HEPARIN SODIUM 5000 UNIT(S): 5000 INJECTION INTRAVENOUS; SUBCUTANEOUS at 01:18

## 2023-07-19 RX ADMIN — INSULIN GLARGINE 9 UNIT(S): 100 INJECTION, SOLUTION SUBCUTANEOUS at 21:40

## 2023-07-19 RX ADMIN — Medication 1: at 11:49

## 2023-07-19 RX ADMIN — CLOPIDOGREL BISULFATE 75 MILLIGRAM(S): 75 TABLET, FILM COATED ORAL at 11:03

## 2023-07-19 RX ADMIN — MAGNESIUM OXIDE 400 MG ORAL TABLET 400 MILLIGRAM(S): 241.3 TABLET ORAL at 11:03

## 2023-07-19 RX ADMIN — BUDESONIDE AND FORMOTEROL FUMARATE DIHYDRATE 2 PUFF(S): 160; 4.5 AEROSOL RESPIRATORY (INHALATION) at 18:04

## 2023-07-19 RX ADMIN — Medication 2: at 18:03

## 2023-07-19 RX ADMIN — HEPARIN SODIUM 550 UNIT(S)/HR: 5000 INJECTION INTRAVENOUS; SUBCUTANEOUS at 19:20

## 2023-07-19 RX ADMIN — Medication 50 MILLIGRAM(S): at 22:25

## 2023-07-19 RX ADMIN — HEPARIN SODIUM 0 UNIT(S)/HR: 5000 INJECTION INTRAVENOUS; SUBCUTANEOUS at 16:04

## 2023-07-19 RX ADMIN — HEPARIN SODIUM 550 UNIT(S)/HR: 5000 INJECTION INTRAVENOUS; SUBCUTANEOUS at 17:16

## 2023-07-19 RX ADMIN — Medication 81 MILLIGRAM(S): at 11:03

## 2023-07-19 NOTE — H&P ADULT - ASSESSMENT
83 y/o female w/ PMHx CAD s/p PCI (mLAD, LCx and RCA), Afib on Eliquis, tachy-morgan syndrome s/p PPM, COPD, TIA, HTN, HLD, and T2DM who presents for 3 days of persistent chest discomfort with elevated troponins on exam and T-wave changes in V3-5. Admitted for management of Type I NSTEMI.  81 y/o female w/ PMHx CAD s/p PCI (mLAD, LCx and RCA), Afib on Eliquis, tachy-morgan syndrome s/p PPM, COPD, TIA, HTN, HLD, and T2DM who presents for 3 days of persistent chest discomfort with elevated troponins on exam and T-wave changes in V2-6. Admitted for management of Type I NSTEMI.

## 2023-07-19 NOTE — H&P ADULT - PROBLEM SELECTOR PLAN 1
- Cardiology consulted by ED, plan is for repeat cardiac catheterization tomorrow  - Keep NPO  - SL NTG PRN for chest pain  - Continue with heparin drip  - Continue Aspirin and plavix  - Continue metoprolol succinate 100mg daily - Cardiology consulted by ED, plan is for repeat cardiac catheterization tomorrow  - Patient would like to speak to cardiology prior to catheterization to see if this is absolutely necessary - she is worried as this will be her 4th catheterization this year  - Keep NPO  - SL NTG PRN for chest pain  - Continue with heparin drip  - Continue Aspirin 81mg and plavix 75mg daily - Cardiology (Dr. Gambino) consulted by ED, plan is for repeat cardiac catheterization tomorrow  - Patient would like to speak to cardiology prior to catheterization to see if this is absolutely necessary - she is worried as this will be her 4th catheterization this year and doesn't want too many invasive procedures  - Keep NPO except for meds  - SL NTG PRN for chest pain  - Continue with heparin drip  - Continue Aspirin 81mg and plavix 75mg daily

## 2023-07-19 NOTE — H&P ADULT - NSHPPHYSICALEXAM_GEN_ALL_CORE
Vital Signs Last 24 Hrs  T(C): 36.8 (19 Jul 2023 01:20), Max: 36.8 (18 Jul 2023 17:23)  T(F): 98.2 (19 Jul 2023 01:20), Max: 98.2 (18 Jul 2023 17:23)  HR: 62 (19 Jul 2023 01:20) (60 - 62)  BP: 188/76 (19 Jul 2023 01:20) (188/76 - 192/85)  BP(mean): 107 (19 Jul 2023 01:20) (107 - 120)  RR: 20 (19 Jul 2023 01:20) (20 - 20)  SpO2: 98% (19 Jul 2023 01:20) (98% - 98%)    Parameters below as of 19 Jul 2023 01:20  Patient On (Oxygen Delivery Method): room air    CONSTITUTIONAL: Well-groomed, in no apparent distress  EYES: No conjunctival or scleral injection, non-icteric;   ENMT: No external nasal lesions; MMM  NECK: Trachea midline without palpable neck mass; thyroid not enlarged and non-tender  RESPIRATORY: Breathing comfortably; no dullness to percussion; lungs CTA without wheeze/rhonchi/rales  CARDIOVASCULAR: +S1S2, RRR, no M/G/R; pedal pulses full and symmetric; no lower extremity edema  GASTROINTESTINAL: No palpable masses or tenderness, +BS throughout, no rebound/guarding; no hepatosplenomegaly; no hernia palpated  LYMPHATIC: No cervical LAD or tenderness  SKIN: No rashes or ulcers noted  NEUROLOGIC: CN II-XII intact; sensation intact in LEs b/l to light touch  PSYCHIATRIC: A+O x 3; mood and affect appropriate; appropriate insight and judgment Vital Signs Last 24 Hrs  T(C): 36.8 (19 Jul 2023 01:20), Max: 36.8 (18 Jul 2023 17:23)  T(F): 98.2 (19 Jul 2023 01:20), Max: 98.2 (18 Jul 2023 17:23)  HR: 62 (19 Jul 2023 01:20) (60 - 62)  BP: 188/76 (19 Jul 2023 01:20) (188/76 - 192/85)  BP(mean): 107 (19 Jul 2023 01:20) (107 - 120)  RR: 20 (19 Jul 2023 01:20) (20 - 20)  SpO2: 98% (19 Jul 2023 01:20) (98% - 98%)    Parameters below as of 19 Jul 2023 01:20  Patient On (Oxygen Delivery Method): room air    CONSTITUTIONAL: Well-groomed, in no apparent distress  EYES: No conjunctival or scleral injection, non-icteric;   ENMT: No external nasal lesions; MMM  NECK: Trachea midline without palpable neck mass; thyroid not enlarged and non-tender  RESPIRATORY: Breathing comfortably; no dullness to percussion; lungs CTA without wheeze/rhonchi/rales  CARDIOVASCULAR: +S1S2, RRR, no M/G/R; pedal pulses full and symmetric; no lower extremity edema  GASTROINTESTINAL: No palpable masses or tenderness, +BS throughout, no rebound/guarding; no hepatosplenomegaly; no hernia palpated  LYMPHATIC: No cervical LAD or tenderness  NEUROLOGIC: CN II-XII intact; sensation intact in LEs b/l to light touch  PSYCHIATRIC: A+O x 3; mood and affect appropriate; appropriate insight and judgment

## 2023-07-19 NOTE — H&P ADULT - PROBLEM SELECTOR PLAN 7
- Continue Symbicort 2 puffs BID  - No wheezing or SOB at time of exam, do not suspect that her COPD is playing a part in her symptoms

## 2023-07-19 NOTE — H&P ADULT - PROBLEM SELECTOR PLAN 4
- Continue Amlodipine 2.5mg daily  - Continue valsartan 320mg daily  - Continue Furosemide 20mg daily - Continue Atorvastatin 40mg daily

## 2023-07-19 NOTE — H&P ADULT - PROBLEM SELECTOR PLAN 2
- Continue amiodarone 200mg  - Heparin gtt for now, switch back to Eliquis 5mg BID after cath - Normocytic anemia w/ Hb 10.4 on admission, decreased from 11.4 on 7/14  - Will check iron, TIBC, and ferritin levels - Normocytic anemia w/ Hb 10.4 on admission, decreased from 11.4 on 7/14  - Patient denies any bloody BMs, no hematomas noted  - Will check iron, TIBC, transferrin and ferritin levels

## 2023-07-19 NOTE — PATIENT PROFILE ADULT - FUNCTIONAL ASSESSMENT - BASIC MOBILITY 6.
4-calculated by average/Not able to assess (calculate score using [facility] averaging method)

## 2023-07-19 NOTE — ED ADULT NURSE REASSESSMENT NOTE - NS ED NURSE REASSESS COMMENT FT1
spoke to Rylee Bae Night ACP in regards to having morning labs drawn at 7:17am when coags are due (pt on heparin, initiated at 01:17) instead of 0600am
two RNs at bedside initated heparin using heparin nomogram protocol, two IV access in place, pt is hypertensive other VS (MD johnny montesinos made aware), A paced on CM, pt educated on risks and benefits of heparin, bleeding and allergic reaction precautions, and pt verbalized understanding. pt resting comfortably and no acute distress noted, safety and comfort measures maintained.
Received Report from ENOCH Donato. met pt at bedside.pt AOX3 Respirations spontaneous and unlabored. Denies CP. On CM, NSR.  Skin intact, warm, dry, normal for race. confirmed heparin order with MD Jones, heparin order changed to ACS nomogram. verified with second RN at bedside. Repeat six hour coags sent.

## 2023-07-19 NOTE — H&P ADULT - NSHPLABSRESULTS_GEN_ALL_CORE
10.4   3.99  )-----------( 272      ( 2023 21:55 )             32.0     07-18    139  |  106  |  15  ----------------------------<  84  5.6<H>   |  21<L>  |  1.04    Ca    9.6      2023 23:32    TPro  7.4  /  Alb  4.1  /  TBili  0.9  /  DBili  x   /  AST  47<H>  /  ALT  43  /  AlkPhos  117  07-18      LIVER FUNCTIONS - ( 2023 23:32 )  Alb: 4.1 g/dL / Pro: 7.4 g/dL / ALK PHOS: 117 U/L / ALT: 43 U/L / AST: 47 U/L / GGT: x           PT/INR - ( 2023 22:58 )   PT: 17.1 sec;   INR: 1.48 ratio         PTT - ( 2023 22:58 )  PTT:32.5 sec  Urinalysis Basic - ( 2023 00:59 )    Color: Colorless / Appearance: Clear / S.005 / pH: x  Gluc: x / Ketone: Negative  / Bili: Negative / Urobili: Negative   Blood: x / Protein: Negative / Nitrite: Negative   Leuk Esterase: Moderate / RBC: 4 /hpf / WBC 4 /HPF   Sq Epi: x / Non Sq Epi: x / Bacteria: Negative 10.4   3.99  )-----------( 272      ( 2023 21:55 )             32.0     07-18    139  |  106  |  15  ----------------------------<  84  5.6<H>   |  21<L>  |  1.04    Ca    9.6      2023 23:32    TPro  7.4  /  Alb  4.1  /  TBili  0.9  /  DBili  x   /  AST  47<H>  /  ALT  43  /  AlkPhos  117  07-18      LIVER FUNCTIONS - ( 2023 23:32 )  Alb: 4.1 g/dL / Pro: 7.4 g/dL / ALK PHOS: 117 U/L / ALT: 43 U/L / AST: 47 U/L / GGT: x           PT/INR - ( 2023 22:58 )   PT: 17.1 sec;   INR: 1.48 ratio         PTT - ( 2023 22:58 )  PTT:32.5 sec    Troponin 152 -> 149    Urinalysis Basic - ( 2023 00:59 )    Color: Colorless / Appearance: Clear / S.005 / pH: x  Gluc: x / Ketone: Negative  / Bili: Negative / Urobili: Negative   Blood: x / Protein: Negative / Nitrite: Negative   Leuk Esterase: Moderate / RBC: 4 /hpf / WBC 4 /HPF   Sq Epi: x / Non Sq Epi: x / Bacteria: Negative

## 2023-07-19 NOTE — H&P ADULT - PROBLEM SELECTOR PLAN 3
- Continue Atorvastatin 40mg daily - Continue amiodarone 200mg  - Continue metoprolol succinate 100mg daily in AM and 50mg at bedtime  - Heparin gtt for now, switch back to Eliquis 5mg BID after cath (last dose was morning of 7/18)

## 2023-07-19 NOTE — H&P ADULT - PROBLEM SELECTOR PLAN 6
- Continue Anoro Ellipta daily - Patient takes Repaglinide and Victoza at home  - Will do insulin basal-bolus regimen while inpatient - Patient takes Repaglinide, metformin, and Victoza at home  - Will do insulin basal-bolus regimen while inpatient - 9u lantus + 3u Lispro TID before meals + sliding scale  - Only give lantus while NPO

## 2023-07-19 NOTE — H&P ADULT - NSHPREVIEWOFSYSTEMS_GEN_ALL_CORE
Review of Systems:   CONSTITUTIONAL: No fever, weight loss  EYES: No eye pain, visual disturbances, or discharge  ENMT:  No difficulty hearing, tinnitus, vertigo; No sinus or throat pain  RESPIRATORY: SOB. No cough, wheezing, chills or hemoptysis  CARDIOVASCULAR: Chest pain, no palpitations, dizziness, or leg swelling  GASTROINTESTINAL: No abdominal or epigastric pain. No nausea, vomiting, or hematemesis; No diarrhea or constipation. No melena or hematochezia.  GENITOURINARY: No dysuria, frequency, hematuria, or incontinence  NEUROLOGICAL: No headaches, memory loss, loss of strength, numbness, or tremors  SKIN: No itching, burning, rashes, or lesions   LYMPH NODES: No enlarged glands  ENDOCRINE: No heat or cold intolerance; No hair loss  MUSCULOSKELETAL: No joint pain or swelling; No muscle, back pain  PSYCHIATRIC: No depression, anxiety, mood swings, or difficulty sleeping  HEME/LYMPH: No easy bruising, or bleeding gums Review of Systems:   CONSTITUTIONAL: Fatigue present; No fever, weight loss  EYES: No eye pain, visual disturbances, or discharge  ENMT:  No difficulty hearing, tinnitus, vertigo; No sinus or throat pain  RESPIRATORY: SOB. No cough, wheezing, chills or hemoptysis  CARDIOVASCULAR: Chest pressure, no palpitations, dizziness, or leg swelling  GASTROINTESTINAL: No abdominal or epigastric pain. No nausea, vomiting, or hematemesis; No diarrhea or constipation. No melena or hematochezia.  GENITOURINARY: No dysuria, frequency, hematuria, or incontinence  NEUROLOGICAL: No headaches, memory loss, loss of strength, numbness, or tremors  SKIN: No itching, burning, rashes, or lesions   LYMPH NODES: No enlarged glands  MUSCULOSKELETAL: No joint pain or swelling; No muscle, back pain  PSYCHIATRIC: No depression, anxiety, mood swings, or difficulty sleeping Review of Systems:   CONSTITUTIONAL: Fatigue present; No fever, weight loss  EYES: No eye pain, visual disturbances, or discharge  ENMT:  No difficulty hearing, tinnitus, vertigo; No sinus or throat pain  RESPIRATORY: SOB. No cough, wheezing, chills or hemoptysis  CARDIOVASCULAR: Chest fullness, no palpitations, dizziness, or leg swelling  GASTROINTESTINAL: No abdominal or epigastric pain. No nausea, vomiting, or hematemesis; No diarrhea or constipation. No melena or hematochezia.  GENITOURINARY: No dysuria, frequency, hematuria, or incontinence  NEUROLOGICAL: No headaches, memory loss, loss of strength, numbness, or tremors  SKIN: No itching, burning, rashes, or lesions   LYMPH NODES: No enlarged glands  MUSCULOSKELETAL: No joint pain or swelling; No muscle, back pain  PSYCHIATRIC: No depression, anxiety, mood swings, or difficulty sleeping

## 2023-07-19 NOTE — CONSULT NOTE ADULT - SUBJECTIVE AND OBJECTIVE BOX
DATE OF SERVICE: 07-19-23 @ 08:37    CHIEF COMPLAINT:Patient is a 82y old  Female who presents with a chief complaint of Type 1 NSTEMI (19 Jul 2023 02:24)      HISTORY OF PRESENT ILLNESS:HPI:  This is an 83 y/o female w/ PMHx CAD s/p PCI (mLAD, LCx and RCA), Afib on Eliquis, tachy-morgan syndrome s/p PPM, COPD, TIA, HTN, HLD, and T2DM who presents for fatigue and weakness. She recently received a PCI to the mLAD on 7/14/23. She states that the day of her angiogram and stenting on 7/14, she felt well afterwards. The next day, she started noticing that she would be very fatigued no matter what she did. Whether the activity was walking or taking a shower, she just felt extremely tired and winded, accompanied by a feeling of fullness in her chest. The symptoms would alex with rest somewhat, but they would persistently recur when she would go back to doing any activity. She denies wheezing, recent sickness or exposure to sick contacts, noncompliance with her COPD medications. Due to persistently feeling this way for 3 days, she contacted Dr. Garrett, who upon hearing her symptoms, directed her to go to the ED. She has no other complaints other than her persistent fatigue and feeling of fullness in her chest.     Her 2nd and 3rd EKGs in the ED showed new T-wave inversions in V2 and V3 as well as T-wave flattening in V4-V6 which was a change from her presenting EKG. Troponins were elevated to 152, repeat 149. She was started on a heparin drip and cardiology was consulted. (19 Jul 2023 02:24)      PAST MEDICAL & SURGICAL HISTORY:  Atrial Fibrillation      DM (Diabetes Mellitus)      HTN (Hypertension)      Hypercholesteremia      Bradycardia      CAD (coronary artery disease)      Kidney stones      Cardiac Pacemaker      S/P Tonsillectomy      History of Oophorectomy      Status Post Hernia Repair      S/P Cataract Surgery      Breast Cyst  L breast cyst removed.      Stented coronary artery              MEDICATIONS:  aMIOdarone    Tablet 200 milliGRAM(s) Oral daily  amLODIPine   Tablet 2.5 milliGRAM(s) Oral daily  aspirin enteric coated 81 milliGRAM(s) Oral daily  clopidogrel Tablet 75 milliGRAM(s) Oral daily  heparin   Injectable 3800 Unit(s) IV Push every 6 hours PRN  heparin  Infusion.  Unit(s)/Hr IV Continuous <Continuous>  metoprolol succinate  milliGRAM(s) Oral daily  metoprolol succinate ER 50 milliGRAM(s) Oral at bedtime  nitroglycerin     SubLingual 0.4 milliGRAM(s) SubLingual every 5 minutes PRN  valsartan 320 milliGRAM(s) Oral daily      budesonide 160 MICROgram(s)/formoterol 4.5 MICROgram(s) Inhaler 2 Puff(s) Inhalation two times a day        atorvastatin 40 milliGRAM(s) Oral at bedtime  dextrose 50% Injectable 25 Gram(s) IV Push once  dextrose 50% Injectable 12.5 Gram(s) IV Push once  dextrose 50% Injectable 25 Gram(s) IV Push once  dextrose Oral Gel 15 Gram(s) Oral once PRN  glucagon  Injectable 1 milliGRAM(s) IntraMuscular once  insulin glargine Injectable (LANTUS) 9 Unit(s) SubCutaneous at bedtime  insulin lispro (ADMELOG) corrective regimen sliding scale   SubCutaneous three times a day before meals  insulin lispro Injectable (ADMELOG) 3 Unit(s) SubCutaneous three times a day before meals    dextrose 5%. 1000 milliLiter(s) IV Continuous <Continuous>  dextrose 5%. 1000 milliLiter(s) IV Continuous <Continuous>  magnesium oxide 400 milliGRAM(s) Oral daily      FAMILY HISTORY:  Family history of hypertension (Father, Mother, Sibling, Aunt)        Non-contributory    SOCIAL HISTORY:    [X] Tobacco  [X] Drugs  [X] Alcohol    Allergies    penicillin (Unknown)    Intolerances    	    REVIEW OF SYSTEMS:  CONSTITUTIONAL: No fever  EYES: No eye pain, visual disturbances, or discharge  ENMT:  No difficulty hearing, tinnitus  NECK: No pain or stiffness  RESPIRATORY: No cough, wheezing,  CARDIOVASCULAR: No chest pain, palpitations, passing out, dizziness, or leg swelling  GASTROINTESTINAL:  No nausea, vomiting, diarrhea or constipation. No melena.  GENITOURINARY: No dysuria, hematuria  NEUROLOGICAL: No stroke like symptoms  SKIN: No burning or lesions   ENDOCRINE: No heat or cold intolerance      All other ROS negative    PHYSICAL EXAM:  T(C): 36.8 (07-19-23 @ 07:18), Max: 36.8 (07-18-23 @ 17:23)  HR: 60 (07-19-23 @ 07:18) (60 - 62)  BP: 173/78 (07-19-23 @ 07:18) (151/64 - 192/85)  RR: 15 (07-19-23 @ 07:18) (15 - 20)  SpO2: 99% (07-19-23 @ 07:18) (98% - 99%)  Wt(kg): --  I&O's Summary      Appearance: Normal	  HEENT:   Normal oral mucosa, EOMI	  Cardiovascular:  S1 S2, No JVD,    Respiratory: Lungs clear to auscultation	  Psychiatry: Alert  Gastrointestinal:  Soft, Non-tender, + BS	  Skin: No rashes   Neurologic: Non-focal  	    	  	  CARDIAC MARKERS:  Labs personally reviewed by me                                  10.4   4.30  )-----------( 234      ( 19 Jul 2023 07:31 )             30.3     07-19    140  |  106  |  14  ----------------------------<  84  4.3   |  20<L>  |  1.03    Ca    9.3      19 Jul 2023 07:31    TPro  6.9  /  Alb  3.9  /  TBili  0.9  /  DBili  x   /  AST  29  /  ALT  35  /  AlkPhos  115  07-19          EKG: Personally reviewed by me - T wave inversions in V2 and V3 as well as T-wave flattening in V4-V6 which was a change from her presenting EKG.  Radiology: Personally reviewed by me -  FINDINGS:  Support devices: Pacemaker overlies the right chest wall.  Cardiac/mediastinum/hilum: Heart size is enlarged.  Lung parenchyma/Pleura: Developing right lower lobe infiltrate. Unchanged   right apical calcifications. . There is no pleural effusion. There is no   pneumothorax.  Skeleton/soft tissues: No acute osseous abnormalities.  IMPRESSION:  Small right lower lobe infiltrate.    TTE 5/10/23  1. Normal left ventricular cavity size. The left ventricular wall thickness is normal. The left ventricular systolic function is normal with an ejection fraction visually estimated at 70 to 75 %. There are no regional wall motion abnormalities seen.  2. Normal right ventricular cavity size and normal systolic function.       Assessment /Plan:   83 y/o female w/ PMHx CAD s/p PCI (mLAD, LCx and RCA), Afib on Eliquis, tachy-morgan syndrome s/p PPM, COPD, TIA, HTN, HLD, and T2DM. Patent reports fatigue, feeling of fullness and chest pressure for 3 days. OP provider directed pt to ED, on presentation elevated troponin and EKG revealing T-wave changes in V2-6. Admitted for management of Type I NSTEMI.      Problem/Plan - 1:  ·  Problem: Type 1 non-ST elevation myocardial infarction (NSTEMI).   - EKG revealing T wave inversions in V2 and V3 as well as T-wave flattening in V4-V6 which was a change from initial EKG.  - Troponin elevated 150-->147-->139  - Continue Heparin gtt  - Continue Aspirin 81mg and Plavix 75mg daily   - Spoke to patient at length regarding catheterization, remains concerned given this is 4th catheterization this year and would like to think further about proceeding     Problem/Plan - 2:  ·  Problem: Atrial fibrillation.   - Continue amiodarone 200mg  - Continue metoprolol succinate 100mg daily in AM and 50mg at bedtime  - Heparin gtt for now  -  Home regimen of Eliquis 5mg BID on hold (last dose was morning of 7/18).    Problem/Plan - 3:  ·  Problem: Hyperlipidemia   - Continue Atorvastatin 40mg daily.    Problem/Plan - 4:  ·  Problem: Hypertension  - Continue Amlodipine 2.5mg daily  - Continue valsartan 320mg daily.      Problem/Plan - 5:  ·  Problem: CAD  - S/p PCI multiple stents to mLAD, LCx and RCA  - Continue Atorvastatin 40mg PO daily     Differential diagnosis and plan of care discussed with patient after the evaluation. Counseling on diet, nutritional counseling, weight management, exercise and medication compliance was done.   Advanced care planning/advanced directives discussed with patient/family. DNR status including forceful chest compressions to attempt to restart the heart, ventilator support/artificial breathing, electric shock, artificial nutrition, health care proxy, Molst form all discussed with pt. Pt wishes to consider. More than fifteen minutes spent on discussing advanced directives.     ZAC Alves DO [facility]  Cardiovascular Medicine  [address] [place] [address] Dr, [address]  Available for call or text via [employer]  Office [phone]   DATE OF SERVICE: 07-19-23 @ 08:37    CHIEF COMPLAINT:Patient is a 82y old  Female who presents with a chief complaint of Type 1 NSTEMI (19 Jul 2023 02:24)      HISTORY OF PRESENT ILLNESS:HPI:  This is an 81 y/o female w/ PMHx CAD s/p PCI (mLAD, LCx and RCA), Afib on Eliquis, tachy-morgan syndrome s/p PPM, COPD, TIA, HTN, HLD, and T2DM who presents for fatigue and weakness. She recently received a PCI to the mLAD on 7/14/23. She states that the day of her angiogram and stenting on 7/14, she felt well afterwards. The next day, she started noticing that she would be very fatigued no matter what she did. Whether the activity was walking or taking a shower, she just felt extremely tired and winded, accompanied by a feeling of fullness in her chest. The symptoms would alex with rest somewhat, but they would persistently recur when she would go back to doing any activity. She denies wheezing, recent sickness or exposure to sick contacts, noncompliance with her COPD medications. Due to persistently feeling this way for 3 days, she contacted Dr. Garrett, who upon hearing her symptoms, directed her to go to the ED. She has no other complaints other than her persistent fatigue and feeling of fullness in her chest.     Her 2nd and 3rd EKGs in the ED showed new T-wave inversions in V2 and V3 as well as T-wave flattening in V4-V6 which was a change from her presenting EKG. Troponins were elevated to 152, repeat 149. She was started on a heparin drip and cardiology was consulted. (19 Jul 2023 02:24)      PAST MEDICAL & SURGICAL HISTORY:  Atrial Fibrillation      DM (Diabetes Mellitus)      HTN (Hypertension)      Hypercholesteremia      Bradycardia      CAD (coronary artery disease)      Kidney stones      Cardiac Pacemaker      S/P Tonsillectomy      History of Oophorectomy      Status Post Hernia Repair      S/P Cataract Surgery      Breast Cyst  L breast cyst removed.      Stented coronary artery              MEDICATIONS:  aMIOdarone    Tablet 200 milliGRAM(s) Oral daily  amLODIPine   Tablet 2.5 milliGRAM(s) Oral daily  aspirin enteric coated 81 milliGRAM(s) Oral daily  clopidogrel Tablet 75 milliGRAM(s) Oral daily  heparin   Injectable 3800 Unit(s) IV Push every 6 hours PRN  heparin  Infusion.  Unit(s)/Hr IV Continuous <Continuous>  metoprolol succinate  milliGRAM(s) Oral daily  metoprolol succinate ER 50 milliGRAM(s) Oral at bedtime  nitroglycerin     SubLingual 0.4 milliGRAM(s) SubLingual every 5 minutes PRN  valsartan 320 milliGRAM(s) Oral daily      budesonide 160 MICROgram(s)/formoterol 4.5 MICROgram(s) Inhaler 2 Puff(s) Inhalation two times a day        atorvastatin 40 milliGRAM(s) Oral at bedtime  dextrose 50% Injectable 25 Gram(s) IV Push once  dextrose 50% Injectable 12.5 Gram(s) IV Push once  dextrose 50% Injectable 25 Gram(s) IV Push once  dextrose Oral Gel 15 Gram(s) Oral once PRN  glucagon  Injectable 1 milliGRAM(s) IntraMuscular once  insulin glargine Injectable (LANTUS) 9 Unit(s) SubCutaneous at bedtime  insulin lispro (ADMELOG) corrective regimen sliding scale   SubCutaneous three times a day before meals  insulin lispro Injectable (ADMELOG) 3 Unit(s) SubCutaneous three times a day before meals    dextrose 5%. 1000 milliLiter(s) IV Continuous <Continuous>  dextrose 5%. 1000 milliLiter(s) IV Continuous <Continuous>  magnesium oxide 400 milliGRAM(s) Oral daily      FAMILY HISTORY:  Family history of hypertension (Father, Mother, Sibling, Aunt)        Non-contributory    SOCIAL HISTORY:    [X] Tobacco  [X] Drugs  [X] Alcohol    Allergies    penicillin (Unknown)    Intolerances    	    REVIEW OF SYSTEMS:  CONSTITUTIONAL: No fever  EYES: No eye pain, visual disturbances, or discharge  ENMT:  No difficulty hearing, tinnitus  NECK: No pain or stiffness  RESPIRATORY: No cough, wheezing,  CARDIOVASCULAR: No chest pain, palpitations, passing out, dizziness, or leg swelling  GASTROINTESTINAL:  No nausea, vomiting, diarrhea or constipation. No melena.  GENITOURINARY: No dysuria, hematuria  NEUROLOGICAL: No stroke like symptoms  SKIN: No burning or lesions   ENDOCRINE: No heat or cold intolerance      All other ROS negative    PHYSICAL EXAM:  T(C): 36.8 (07-19-23 @ 07:18), Max: 36.8 (07-18-23 @ 17:23)  HR: 60 (07-19-23 @ 07:18) (60 - 62)  BP: 173/78 (07-19-23 @ 07:18) (151/64 - 192/85)  RR: 15 (07-19-23 @ 07:18) (15 - 20)  SpO2: 99% (07-19-23 @ 07:18) (98% - 99%)  Wt(kg): --  I&O's Summary      Appearance: Normal	  HEENT:   Normal oral mucosa, EOMI	  Cardiovascular:  S1 S2, No JVD,    Respiratory: Lungs clear to auscultation	  Psychiatry: Alert  Gastrointestinal:  Soft, Non-tender, + BS	  Skin: No rashes   Neurologic: Non-focal  	    	  	  CARDIAC MARKERS:  Labs personally reviewed by me                                  10.4   4.30  )-----------( 234      ( 19 Jul 2023 07:31 )             30.3     07-19    140  |  106  |  14  ----------------------------<  84  4.3   |  20<L>  |  1.03    Ca    9.3      19 Jul 2023 07:31    TPro  6.9  /  Alb  3.9  /  TBili  0.9  /  DBili  x   /  AST  29  /  ALT  35  /  AlkPhos  115  07-19          EKG: Personally reviewed by me - T wave inversions in V2 and V3 as well as T-wave flattening in V4-V6 which was a change from her presenting EKG.  Radiology: Personally reviewed by me -  FINDINGS:  Support devices: Pacemaker overlies the right chest wall.  Cardiac/mediastinum/hilum: Heart size is enlarged.  Lung parenchyma/Pleura: Developing right lower lobe infiltrate. Unchanged   right apical calcifications. . There is no pleural effusion. There is no   pneumothorax.  Skeleton/soft tissues: No acute osseous abnormalities.  IMPRESSION:  Small right lower lobe infiltrate.    TTE 5/10/23  1. Normal left ventricular cavity size. The left ventricular wall thickness is normal. The left ventricular systolic function is normal with an ejection fraction visually estimated at 70 to 75 %. There are no regional wall motion abnormalities seen.  2. Normal right ventricular cavity size and normal systolic function.       Assessment /Plan:   81 y/o female w/ PMHx CAD s/p PCI (mLAD, LCx and RCA), Afib on Eliquis, tachy-morgan syndrome s/p PPM, COPD, TIA, HTN, HLD, and T2DM. Patent reports fatigue, feeling of fullness and chest pressure for 3 days. OP provider directed pt to ED, on presentation elevated troponin and EKG revealing T-wave changes in V2-6. Admitted for management of Type I NSTEMI.      Problem/Plan - 1:  ·  Problem: Type 1 non-ST elevation myocardial infarction (NSTEMI).   - EKG revealing T wave inversions in V2 and V3 as well as T-wave flattening in V4-V6 which was a change from initial EKG.  - Troponin elevated 150-->147-->139  - Continue Heparin gtt  - Continue Aspirin 81mg and Plavix 75mg daily   - Spoke to patient at length regarding catheterization, remains concerned given this is 4th catheterization this year and would like to think further about proceeding   - TTE to eval for new WMA ordered     Problem/Plan - 2:  ·  Problem: Atrial fibrillation.   - Continue amiodarone 200mg  - Continue metoprolol succinate 100mg daily in AM and 50mg at bedtime  - Heparin gtt for now for possible NSTEMI  -  Home regimen of Eliquis 5mg BID on hold (last dose was morning of 7/18).    Problem/Plan - 3:  ·  Problem: Hyperlipidemia   - Continue Atorvastatin 40mg daily.    Problem/Plan - 4:  ·  Problem: Hypertension  - Continue Amlodipine 2.5mg daily  - Continue valsartan 320mg daily.      Problem/Plan - 5:  ·  Problem: CAD  - S/p PCI multiple stents to mLAD, LCx and RCA  - Continue Atorvastatin 40mg PO daily     Differential diagnosis and plan of care discussed with patient after the evaluation. Counseling on diet, nutritional counseling, weight management, exercise and medication compliance was done.   Advanced care planning/advanced directives discussed with patient/family. DNR status including forceful chest compressions to attempt to restart the heart, ventilator support/artificial breathing, electric shock, artificial nutrition, health care proxy, Molst form all discussed with pt. Pt wishes to consider. More than fifteen minutes spent on discussing advanced directives.     ZAC Alves, DO [facility]  Cardiovascular Medicine  [address] [place] [address] Dr, [address]  Available for call or text via [employer]  Office [phone]

## 2023-07-19 NOTE — H&P ADULT - HISTORY OF PRESENT ILLNESS
This is an 81 y/o female w/ PMHx CAD s/p PCI (mLAD, LCx and RCA), Afib on Eliquis, tachy-morgan syndrome s/p PPM, COPD, TIA, HTN, HLD, and T2DM who presents for chest discomfort. She has been having chest discomfort and some shortness of breath on exertion for 3 days. The pain is left-sided, occurs during walking, and occurs persistently. The intensity of the pain has been the same. In addition, she has also been having a cough as well. She recently received a PCI to the mLAD on 7/14/23     Her EKG in the ED showed new T-wave inversion in V3 as well as T-wave flattening in V4 and V5. She was started on a heparin drip and cardiology was consulted. This is an 81 y/o female w/ PMHx CAD s/p PCI (mLAD, LCx and RCA), Afib on Eliquis, tachy-morgan syndrome s/p PPM, COPD, TIA, HTN, HLD, and T2DM who presents for fatigue and weakness. She recently received a PCI to the mLAD on 7/14/23. She states that the day of her angiogram and stenting on 7/14, she felt well afterwards. The next day, she started noticing that she would be very fatigued no matter what she did. Whether the activity was walking or taking a shower, she just felt extremely tired and winded, accompanied by a feeling of fullness in her chest. The symptoms would alex with rest somewhat, but they would persistently recur when she would go back to doing any activity. She denies wheezing, recent sickness or exposure to sick contacts, noncompliance with her COPD medications. Due to persistently feeling this way for 3 days, she contacted Dr. Garrett, who upon hearing her symptoms, directed her to go to the ED. She has no other complaints other than her persistent fatigue and feeling of fullness in her chest.     Her 2nd and 3rd EKGs in the ED showed new T-wave inversions in V2 and V3 as well as T-wave flattening in V4-V6 which was a change from her presenting EKG. Troponins were elevated to 152, repeat 149. She was started on a heparin drip and cardiology was consulted.

## 2023-07-19 NOTE — CHART NOTE - NSCHARTNOTEFT_GEN_A_CORE
Patient seen and examined at bedside. NAC. Denies CP, SOB, lightheadedness, dizziness.     Vital Signs Last 24 Hrs  T(C): 36.8 (19 Jul 2023 08:43), Max: 36.8 (18 Jul 2023 17:23)  T(F): 98.2 (19 Jul 2023 08:43), Max: 98.3 (19 Jul 2023 07:18)  HR: 60 (19 Jul 2023 08:43) (60 - 62)  BP: 172/79 (19 Jul 2023 08:43) (151/64 - 192/85)  BP(mean): 91 (19 Jul 2023 06:10) (89 - 120)  RR: 17 (19 Jul 2023 08:43) (15 - 20)  SpO2: 95% (19 Jul 2023 08:43) (95% - 99%)    Parameters below as of 19 Jul 2023 08:43  Patient On (Oxygen Delivery Method): room air        CONSTITUTIONAL: Well-groomed, in no apparent distress  EYES: No conjunctival or scleral injection, non-icteric  NECK: Supple; Trachea midline  RESPIRATORY: Breathing comfortably; lungs CTA without wheeze  CARDIOVASCULAR: +S1S2, RRR; no lower extremity edema  GASTROINTESTINAL: No tenderness, +BS throughout, no rebound/guarding  MUSCULOSKELETAL: moves all extremities   NEUROLOGIC: CN II-XII grossly intact  PSYCHIATRIC: A+O x 3; mood and affect appropriate; appropriate insight and judgment    LABS:                         10.4   4.30  )-----------( 234      ( 19 Jul 2023 07:31 )             30.3     07-19    140  |  106  |  14  ----------------------------<  84  4.3   |  20<L>  |  1.03    Ca    9.3      19 Jul 2023 07:31    TPro  6.9  /  Alb  3.9  /  TBili  0.9  /  DBili  x   /  AST  29  /  ALT  35  /  AlkPhos  115  07-19    PT/INR - ( 19 Jul 2023 07:31 )   PT: 17.4 sec;   INR: 1.49 ratio         PTT - ( 19 Jul 2023 07:31 )  PTT:>200.0 sec  Urinalysis Basic - ( 19 Jul 2023 07:31 )    Color: x / Appearance: x / SG: x / pH: x  Gluc: 84 mg/dL / Ketone: x  / Bili: x / Urobili: x   Blood: x / Protein: x / Nitrite: x   Leuk Esterase: x / RBC: x / WBC x   Sq Epi: x / Non Sq Epi: x / Bacteria: x      Brief A&P   #CP  #NSTEMI  #AFib   #HTN  -Currently CP free. Trops peaked. On Hep gtt, ASA, Plavix, statin, metoprolol. Cards following - f/u recs -- pt unsure if she wants to pursue cardiac cath, wants to d/w cards team   -C/w amiodarone  -C/w ARB, increased norvasc. Monitor BP.     See same day H&P for further details   D/w ACP

## 2023-07-19 NOTE — H&P ADULT - PROBLEM SELECTOR PLAN 5
- Patient takes Repaglinide and Victoza at home  - Will do insulin basal-bolus regimen while inpatient - Continue Amlodipine 2.5mg daily  - Continue valsartan 320mg daily

## 2023-07-20 ENCOUNTER — TRANSCRIPTION ENCOUNTER (OUTPATIENT)
Age: 83
End: 2023-07-20

## 2023-07-20 LAB
A1C WITH ESTIMATED AVERAGE GLUCOSE RESULT: 7.5 % — HIGH (ref 4–5.6)
APTT BLD: 43.9 SEC — HIGH (ref 27.5–35.5)
APTT BLD: 56.4 SEC — HIGH (ref 27.5–35.5)
ESTIMATED AVERAGE GLUCOSE: 169 MG/DL — HIGH (ref 68–114)
GLUCOSE BLDC GLUCOMTR-MCNC: 136 MG/DL — HIGH (ref 70–99)
GLUCOSE BLDC GLUCOMTR-MCNC: 165 MG/DL — HIGH (ref 70–99)
GLUCOSE BLDC GLUCOMTR-MCNC: 169 MG/DL — HIGH (ref 70–99)
GLUCOSE BLDC GLUCOMTR-MCNC: 210 MG/DL — HIGH (ref 70–99)
HCT VFR BLD CALC: 31.8 % — LOW (ref 34.5–45)
HGB BLD-MCNC: 10.7 G/DL — LOW (ref 11.5–15.5)
MCHC RBC-ENTMCNC: 28.4 PG — SIGNIFICANT CHANGE UP (ref 27–34)
MCHC RBC-ENTMCNC: 33.6 GM/DL — SIGNIFICANT CHANGE UP (ref 32–36)
MCV RBC AUTO: 84.4 FL — SIGNIFICANT CHANGE UP (ref 80–100)
NRBC # BLD: 0 /100 WBCS — SIGNIFICANT CHANGE UP (ref 0–0)
PLATELET # BLD AUTO: 236 K/UL — SIGNIFICANT CHANGE UP (ref 150–400)
RBC # BLD: 3.77 M/UL — LOW (ref 3.8–5.2)
RBC # FLD: 12.4 % — SIGNIFICANT CHANGE UP (ref 10.3–14.5)
WBC # BLD: 3.83 K/UL — SIGNIFICANT CHANGE UP (ref 3.8–10.5)
WBC # FLD AUTO: 3.83 K/UL — SIGNIFICANT CHANGE UP (ref 3.8–10.5)

## 2023-07-20 PROCEDURE — 76376 3D RENDER W/INTRP POSTPROCES: CPT | Mod: 26

## 2023-07-20 PROCEDURE — 99233 SBSQ HOSP IP/OBS HIGH 50: CPT

## 2023-07-20 PROCEDURE — 93306 TTE W/DOPPLER COMPLETE: CPT | Mod: 26

## 2023-07-20 RX ORDER — ASPIRIN/CALCIUM CARB/MAGNESIUM 324 MG
1 TABLET ORAL
Qty: 0 | Refills: 0 | DISCHARGE

## 2023-07-20 RX ORDER — APIXABAN 2.5 MG/1
1 TABLET, FILM COATED ORAL
Qty: 0 | Refills: 0 | DISCHARGE
Start: 2023-07-20

## 2023-07-20 RX ORDER — APIXABAN 2.5 MG/1
5 TABLET, FILM COATED ORAL
Refills: 0 | Status: DISCONTINUED | OUTPATIENT
Start: 2023-07-20 | End: 2023-07-20

## 2023-07-20 RX ORDER — ASPIRIN/CALCIUM CARB/MAGNESIUM 324 MG
1 TABLET ORAL
Qty: 0 | Refills: 0 | DISCHARGE
Start: 2023-07-20

## 2023-07-20 RX ORDER — AMLODIPINE BESYLATE 2.5 MG/1
1 TABLET ORAL
Qty: 0 | Refills: 0 | DISCHARGE
Start: 2023-07-20

## 2023-07-20 RX ORDER — AMLODIPINE BESYLATE 2.5 MG/1
1 TABLET ORAL
Refills: 0 | DISCHARGE

## 2023-07-20 RX ORDER — MAGNESIUM OXIDE 400 MG ORAL TABLET 241.3 MG
1 TABLET ORAL
Qty: 0 | Refills: 0 | DISCHARGE
Start: 2023-07-20

## 2023-07-20 RX ADMIN — Medication 81 MILLIGRAM(S): at 13:37

## 2023-07-20 RX ADMIN — INSULIN GLARGINE 9 UNIT(S): 100 INJECTION, SOLUTION SUBCUTANEOUS at 22:14

## 2023-07-20 RX ADMIN — Medication 100 MILLIGRAM(S): at 05:29

## 2023-07-20 RX ADMIN — Medication 1: at 13:36

## 2023-07-20 RX ADMIN — Medication 3 UNIT(S): at 09:32

## 2023-07-20 RX ADMIN — CLOPIDOGREL BISULFATE 75 MILLIGRAM(S): 75 TABLET, FILM COATED ORAL at 13:37

## 2023-07-20 RX ADMIN — BUDESONIDE AND FORMOTEROL FUMARATE DIHYDRATE 2 PUFF(S): 160; 4.5 AEROSOL RESPIRATORY (INHALATION) at 17:16

## 2023-07-20 RX ADMIN — Medication 50 MILLIGRAM(S): at 22:11

## 2023-07-20 RX ADMIN — AMLODIPINE BESYLATE 10 MILLIGRAM(S): 2.5 TABLET ORAL at 05:29

## 2023-07-20 RX ADMIN — Medication 3 UNIT(S): at 13:36

## 2023-07-20 RX ADMIN — APIXABAN 5 MILLIGRAM(S): 2.5 TABLET, FILM COATED ORAL at 15:23

## 2023-07-20 RX ADMIN — BUDESONIDE AND FORMOTEROL FUMARATE DIHYDRATE 2 PUFF(S): 160; 4.5 AEROSOL RESPIRATORY (INHALATION) at 05:30

## 2023-07-20 RX ADMIN — Medication 1: at 09:31

## 2023-07-20 RX ADMIN — HEPARIN SODIUM 650 UNIT(S)/HR: 5000 INJECTION INTRAVENOUS; SUBCUTANEOUS at 09:09

## 2023-07-20 RX ADMIN — ATORVASTATIN CALCIUM 40 MILLIGRAM(S): 80 TABLET, FILM COATED ORAL at 22:12

## 2023-07-20 RX ADMIN — MAGNESIUM OXIDE 400 MG ORAL TABLET 400 MILLIGRAM(S): 241.3 TABLET ORAL at 13:37

## 2023-07-20 RX ADMIN — HEPARIN SODIUM 550 UNIT(S)/HR: 5000 INJECTION INTRAVENOUS; SUBCUTANEOUS at 00:40

## 2023-07-20 RX ADMIN — AMIODARONE HYDROCHLORIDE 200 MILLIGRAM(S): 400 TABLET ORAL at 05:29

## 2023-07-20 RX ADMIN — VALSARTAN 320 MILLIGRAM(S): 80 TABLET ORAL at 07:03

## 2023-07-20 RX ADMIN — Medication 3 UNIT(S): at 18:18

## 2023-07-20 NOTE — PHYSICAL THERAPY INITIAL EVALUATION ADULT - PRECAUTIONS/LIMITATIONS, REHAB EVAL
cardiac precautions Rotation Flap Text: The defect edges were debeveled with a #15 scalpel blade.  Given the location of the defect, shape of the defect and the proximity to free margins a rotation flap was deemed most appropriate.  Using a sterile surgical marker, an appropriate rotation flap was drawn incorporating the defect and placing the expected incisions within the relaxed skin tension lines where possible.    The area thus outlined was incised deep to adipose tissue with a #15 scalpel blade.  The skin margins were undermined to an appropriate distance in all directions utilizing iris scissors.

## 2023-07-20 NOTE — DISCHARGE NOTE PROVIDER - NSDCFUADDAPPT_GEN_ALL_CORE_FT
APPTS ARE READY TO BE MADE: [X] YES    Best Family or Patient Contact (if needed):    Additional Information about above appointments (if needed):    1: Cardiologist 1 week f/u  2: Primary care doctor 1 week with repeat labs to monitor kidney function and blood counts  3:     Other comments or requests:    APPTS ARE READY TO BE MADE: [X] YES    Best Family or Patient Contact (if needed):    Additional Information about above appointments (if needed):    1: Cardiologist 1 week f/u  2: Primary care doctor 1 week with repeat labs to monitor kidney function and blood counts  3:     Other comments or requests:     Patient is scheduled to see Dr. Garrett at 1:30PM on 7/24 at [address] [facility], [address] [place] [phone]. Dr. Garrett is also their PCP.    Patient was previously scheduled to see Dr. Ramos at 11:30AM on 7/27 at the patient's home through [facility].

## 2023-07-20 NOTE — PHYSICAL THERAPY INITIAL EVALUATION ADULT - PERTINENT HX OF CURRENT PROBLEM, REHAB EVAL
Pt is 82F admitted 7/19/23 PMHx CAD s/p PCI (mLAD, LCx and RCA), Afib on Eliquis, tachy-morgan syndrome s/p PPM, COPD, TIA, HTN, HLD, and T2DM who presents for fatigue and weakness. She recently received a PCI to the mLAD on 7/14/23. She states that the day of her angiogram and stenting on 7/14, she felt well afterwards. The next day, she started noticing that she would be very fatigued no matter what she did. Whether the activity was walking or taking a shower, she just felt extremely tired and winded, accompanied by a feeling of fullness in her chest. The symptoms would alex with rest somewhat, but they would persistently recur when she would go back to doing any activity. She denies wheezing, recent sickness or exposure to sick contacts, noncompliance with her COPD medications. Due to persistently feeling this way for 3 days, she contacted Dr. Garrett, who upon hearing her symptoms, directed her to go to the ED. She has no other complaints other than her persistent fatigue and feeling of fullness in her chest.     Her 2nd and 3rd EKGs in the ED showed new T-wave inversions in V2 and V3 as well as T-wave flattening in V4-V6 which was a change from her presenting EKG. Troponins were elevated to 152, repeat 149. She was started on a heparin drip and cardiology was consulted.      XRAY CHEST: Small right lower lobe infiltrate.

## 2023-07-20 NOTE — DISCHARGE NOTE PROVIDER - NSDCMRMEDTOKEN_GEN_ALL_CORE_FT
amiodarone 200 mg oral tablet: 1 orally once a day  amLODIPine 2.5 mg oral tablet: 1 orally once a day  aspirin 81 mg oral tablet: 1 tablet orally once a day Take for 7 days only, STOP on 7/22  atorvastatin 40 mg oral tablet: 1 tab(s) orally once a day  budesonide-formoterol 160 mcg-4.5 mcg/inh inhalation aerosol: 2 inhaled 2 times a day  clopidogrel 75 mg oral tablet: 1 tab(s) orally once a day  Diovan 320 mg oral tablet: 1 tab(s) orally once a day  Eliquis 5 mg oral tablet: 1 tablet orally 2 times a day HOLD for 24 hrs, RESUME 7/15  Glucophage 1000 mg oral tablet: 1 tablet orally 2 times a day HOLD for 48 hrs, RESUME 7/17  magnesium oxide 400 mg oral capsule: 1 orally 2 times a day  metoprolol succinate 100 mg oral tablet, extended release: 1 orally once a day Morning  metoprolol succinate 50 mg oral capsule, extended release: 1 orally once a day (at bedtime)  repaglinide 2 mg oral tablet: 1 orally once a day  Victoza 18 mg/3 mL subcutaneous solution: 12 milligram(s) subcutaneously once a day   amiodarone 200 mg oral tablet: 1 orally once a day  amLODIPine 10 mg oral tablet: 1 tab(s) orally once a day  apixaban 5 mg oral tablet: 1 tab(s) orally 2 times a day  aspirin 81 mg oral delayed release tablet: 1 tab(s) orally once a day  atorvastatin 40 mg oral tablet: 1 tab(s) orally once a day  budesonide-formoterol 160 mcg-4.5 mcg/inh inhalation aerosol: 2 inhaled 2 times a day  clopidogrel 75 mg oral tablet: 1 tab(s) orally once a day  Diovan 320 mg oral tablet: 1 tab(s) orally once a day  Glucophage 1000 mg oral tablet: 1 tablet orally 2 times a day  magnesium oxide 400 mg oral tablet: 1 tab(s) orally once a day  metoprolol succinate 100 mg oral tablet, extended release: 1 orally once a day Morning  metoprolol succinate 50 mg oral capsule, extended release: 1 orally once a day (at bedtime)  repaglinide 2 mg oral tablet: 1 orally once a day  Victoza 18 mg/3 mL subcutaneous solution: 12 milligram(s) subcutaneously once a day

## 2023-07-20 NOTE — PHYSICAL THERAPY INITIAL EVALUATION ADULT - GENERAL OBSERVATIONS, REHAB EVAL
received OOB sitting in chair, A&OX4, following commands, pleasant & eager to participate, a/w weakness, NSTEMI, +heparin drip.

## 2023-07-20 NOTE — DISCHARGE NOTE PROVIDER - HOSPITAL COURSE
81 y/o female w/ PMHx CAD s/p PCI (mLAD, LCx and RCA), Afib on Eliquis, tachy-morgan syndrome s/p PPM, COPD, TIA, HTN, HLD, and T2DM who presents for 3 days of persistent chest discomfort with elevated troponins on exam and T-wave changes in V2-6. Admitted for management of Type I NSTEMI. EKG revealing T wave inversions in V2 and V3 as well as T-wave flattening in V4-V6 which was a change from initial EKG.  Troponin elevated 150-->147-->139. Started on a Heparin gtt and continued on Aspirin 81mg and Plavix 75mg daily. Cardiology called and spoke to patient at length regarding catheterization, patient concerned given this is 4th catheterization this year and did not want to proceed with cath at this time. Patient is currently chest pain free, Trops have peaked. TTE  Normal left ventricular cavity size. The left ventricular wall thickness is normal. The left ventricular systolic function is normal with an ejection fraction of 56 %. Compared to the transthoracic echocardiogram performed on 5/10/2023 there have been no significant interval changes. Patient is cleared for discharge home with outpatient follow up with Dr. Garrett within 1 week of discharge. 81 y/o female w/ PMHx CAD s/p PCI (mLAD, LCx and RCA), Afib on Eliquis, tachy-morgan syndrome s/p PPM, COPD, TIA, HTN, HLD, and T2DM who presents for 3 days of persistent chest discomfort with elevated troponins on exam and T-wave changes in V2-6. Admitted for management of Type I NSTEMI. EKG revealing T wave inversions in V2 and V3 as well as T-wave flattening in V4-V6 which was a change from initial EKG.  Troponin elevated 150-->147-->139. Started on a Heparin gtt and continued on Aspirin 81mg and Plavix 75mg daily. Cardiology called and spoke to patient at length regarding catheterization, patient concerned given this is 4th catheterization this year and initially did not want to proceed with cath at this time. Patient is currently chest pain free, Trops have peaked. TTE  Normal left ventricular cavity size. The left ventricular wall thickness is normal. The left ventricular systolic function is normal with an ejection fraction of 56 %. Compared to the transthoracic echocardiogram performed on 5/10/2023 there have been no significant interval changes. S/p dx C: luminal irregularities via RRA 7/21. Patient is medically optimized for discharge home with outpatient follow up with Dr. Garrett within 1 week of discharge.

## 2023-07-20 NOTE — DISCHARGE NOTE PROVIDER - ATTENDING DISCHARGE PHYSICAL EXAMINATION:
Seen and examined at bedside. NAC. Denies CP, SOB. See discharge date progress note for further details.   CONSTITUTIONAL: Well-groomed, in no apparent distress  EYES: No conjunctival or scleral injection, non-icteric  NECK: Supple; Trachea midline  RESPIRATORY: Breathing comfortably; lungs CTA without wheeze  CARDIOVASCULAR: +S1S2, RRR; no lower extremity edema  GASTROINTESTINAL: No tenderness, +BS throughout, no rebound/guarding  MUSCULOSKELETAL: moves all extremities   NEUROLOGIC: CN II-XII grossly intact  PSYCHIATRIC: A+O x 3; mood and affect appropriate; appropriate insight and judgment

## 2023-07-20 NOTE — DISCHARGE NOTE PROVIDER - NSDCCPCAREPLAN_GEN_ALL_CORE_FT
PRINCIPAL DISCHARGE DIAGNOSIS  Diagnosis: ACS (acute coronary syndrome)  Assessment and Plan of Treatment: You have been diagnosed with acute coronary syndrome while in the hospital. This condition develops when part of the heart muscle doesn't get enough oxygen.   You have decided to forego a cardiac cath. Your echocardiogram did not show irregularities. Please follow up with Dr. Garrett within 1 week.   Follow the below lifestyle change recommendations:  - Avoid smoking and do not let anyone smoke in your house. If you find it difficult to quit ask for help or contact your doctor for a referral to counselling groups   - Avoid fatty food and stay away from fast food restaurant. Excessive consumption of fatty food can increase your cholesterol level which is a risk factorfor cardiovascular disease   - If you already have hypertension, take your blood pressure medication as prescribed.   - Consult your doctor about any change in your exercise plan    - Take your blood thinners and cholesterol medication as prescribed. Do not run out on your medication and always contact your doctor if you need refills   - Call 911 or report to the emergency department if you have chest pain or pressure sensation in your chest, especially if the pain goes to your neck, jaw, back or arms or it is accompanied by sweating, nausea and vomiting    - Contact your doctor if you have worsening shortness of breath, during activity or at rest, or if you feel dizzy, if you feel that your hertbeat is very fast very slow or not steady   - Any changes in your chest pain may mean that your disease is getting worse : These changes include : chest pain that is getting stronger, that is occuring more frequently, that lasts longer, that occurs when you are not active or medicines do not improve your symptoms like they used to      SECONDARY DISCHARGE DIAGNOSES  Diagnosis: Anemia  Assessment and Plan of Treatment: Normocytic anemia w/ Hgb of 10.4 on admission, decreased from 11.4 on 7/14/2023  - You deny any bloody BMs, no hematomas noted, ferritin and iron levels within normal limits please follow with repeat labs to trend with Dr. Garrett.       Diagnosis: HTN (hypertension)  Assessment and Plan of Treatment: Hypertension  Hypertension, commonly called high blood pressure, is when the force of blood pumping through your arteries is too strong. Hypertension forces your heart to work harder to pump blood. Your arteries may become narrow or stiff. Having untreated or uncontrolled hypertension for a long period of time can cause heart attack, stroke, kidney disease, and other problems. If started on a medication, take exactly as prescribed by your health care professional. Maintain a healthy lifestyle and follow up with your primary care physician.  SEEK IMMEDIATE MEDICAL CARE IF YOU HAVE ANY OF THE FOLLOWING SYMPTOMS: severe headache, confusion, chest pain, abdominal pain, vomiting, or shortness of breath.    Diagnosis: Atrial fibrillation  Assessment and Plan of Treatment: Atrial fibrillation is a type of irregular heartbeat (arrhythmia) where the heart quivers continuously in a chaotic pattern that makes the heart unable to pump blood normally. This can increase the risk for stroke, heart failure, and other heart-related conditions. Atrial fibrillation can be caused by a variety of conditions and may be temporary, intermittent, or permanent. Symptoms include feeling that your heart is beating rapidly or irregularly, chest discomfort, shortness of breath, or dizziness/lightheadedness that may be worse with exertion. Treatment is varied but may involve medication or electrical shock (cardioversion).  SEEK IMMEDIATE MEDICAL CARE IF YOU HAVE ANY OF THE FOLLOWING SYMPTOMS: chest pain, shortness of breath, abdominal pain, sweating, vomiting, blood in vomit/bowel movements/urine, dizziness/lightheadedness, weakness or numbness to face/arm/leg, trouble speaking or understanding, facial droop.    Diagnosis: T2DM (type 2 diabetes mellitus)  Assessment and Plan of Treatment: Diabetes is a chronic condition caused by high blood sugar levels. Your blood sugar levels become high because your body does not have enough insulin. Insulin helps move sugar out of the blood so it can be used for energy. Increased sugar in your blood can cause problems in several organs of your body including but not limited to your blood vessels, your kidneys, your brain, and your eyes. The lack of insulin forces your body to use fat instead of sugar for energy. This can be dangerous if not controlled.  Seek Medical Attention If:  You have a seizure.  You begin to breathe fast, or are short of breath.  You become weak and confused.  You are more drowsy than usual.  You have fruity, sweet breath.  You have severe, new stomach pain and are vomiting.  Your blood sugar level is lower or higher than your healthcare provider says it should be.  You have ketones in your blood or urine.  You have a fever or chills.  You are more thirsty than usual.  You are urinating more often than usual.  You have questions or concerns about your condition or care.  Insulin and diabetes medicine decreases the amount of sugar in your blood.  The best way to prevent problems from Diabetes is to control your blood sugars.   Monitor your blood sugar levels closely. Administer Insulin as directed by your physician.   Speak with your doctor and/or a nutritionist about the best way to change your lifestyle and dietary habits to avoid any problems from Diabetes in the future.     PRINCIPAL DISCHARGE DIAGNOSIS  Diagnosis: Type 1 non-ST elevation myocardial infarction (NSTEMI)  Assessment and Plan of Treatment: Call your doctor if you have unusual chest pain, pressure, or discomfort, shortness of breath, nausea, vomiting, burping, heartburn, tingling upper body parts, sweating, cold, clammy sking, racing heartbeat  Call 911 if you think you are having a heart attack  Take all cardiac medications as prescribed - notify your doctor if you have any side effects  Follow cardiac diet - avoid fatty & fried foods, don't eat too much red meat, eat lots of fruits & vegetables, dairy products should be low fat  Lose weight if you are overweight  Become more active with walking, gardening, or any other activity that gets you to move      SECONDARY DISCHARGE DIAGNOSES  Diagnosis: HTN (hypertension)  Assessment and Plan of Treatment: Take medications for your blood pressure as recommended.  Eat a heart healthy diet that is low in saturated fats and salt, and includes whole grains, fruits, vegetables and lean protein   Exercise regularly (consult with your physician or cardiologist first); maintain a heart healthy weight.   If you smoke - quit (A resource to help you stop smoking is the [facility] – phone number 136-722-2297.). Continue to follow with your primary physician or cardiologist.   Seek medical help for dizziness, Lightheadedness, Blurry vision, Headache, Chest pain, Shortness of breath  Follow up with your medical doctor to establish long term blood pressure treatment goals.    Diagnosis: T2DM (type 2 diabetes mellitus)  Assessment and Plan of Treatment: Make sure you get your HgA1c checked every three months.  If you take oral diabetes medications, check your blood glucose two times a day.  If you take insulin, check your blood glucose before meals and at bedtime.  It's important not to skip any meals.  Keep a log of your blood glucose results and always take it with you to your doctor appointments.  Keep a list of your current medications including injectables and over the counter medications and bring this medication list with you to all your doctor appointments.  If you have not seen your ophthalmologist this year call for appointment.  Check your feet daily for redness, sores, or openings. Do not self treat. If no improvement in two days call your primary care physician for an appointment.  Low blood sugar (hypoglycemia) is a blood sugar below 70mg/dl. Check your blood sugar if you feel signs/symptoms of hypoglycemia. If your blood sugar is below 70 take 15 grams of carbohydrates (ex 4 oz of apple juice, 3-4 glucose tablets, or 4-6 oz of regular soda) wait 15 minutes and repeat blood sugar to make sure it comes up above 70.  If your blood sugar is above 70 and you are due for a meal, have a meal.  If you are not due for a meal have a snack.  This snack helps keeps your blood sugar at a safe range.    Diagnosis: Atrial fibrillation  Assessment and Plan of Treatment: Atrial fibrillation is the most common heart rhythm problem.  The condition puts you at risk for has stroke and heart attack  It helps if you control your blood pressure, not drink more than 1-2 alcohol drinks per day, cut down on caffeine, getting treatment for over active thyroid gland, and get regular exercise  Call your doctor if you feel your heart racing or beating unusually, chest tightness or pain, lightheaded, faint, shortness of breath especially with exercise  It is important to take your heart medication as prescribed  You may be on anticoagulation which is very important to take as directed - you may need blood work to monitor drug levels    Diagnosis: Anemia  Assessment and Plan of Treatment: Normocytic anemia w/ Hgb of 10.4 on admission, decreased from 11.4 on 7/14/2023  - You deny any bloody BMs, no hematomas noted, ferritin and iron levels within normal limits please follow with repeat labs to trend with Dr. Garrett.        PRINCIPAL DISCHARGE DIAGNOSIS  Diagnosis: Type 1 non-ST elevation myocardial infarction (NSTEMI)  Assessment and Plan of Treatment: Call your doctor if you have unusual chest pain, pressure, or discomfort, shortness of breath, nausea, vomiting, burping, heartburn, tingling upper body parts, sweating, cold, clammy sking, racing heartbeat  Call 911 if you think you are having a heart attack  Take all cardiac medications as prescribed - notify your doctor if you have any side effects  Follow cardiac diet - avoid fatty & fried foods, don't eat too much red meat, eat lots of fruits & vegetables, dairy products should be low fat  Lose weight if you are overweight  Become more active with walking, gardening, or any other activity that gets you to move  F/u with you cardiologist in 1 week      SECONDARY DISCHARGE DIAGNOSES  Diagnosis: HTN (hypertension)  Assessment and Plan of Treatment: Take medications for your blood pressure as recommended.  Eat a heart healthy diet that is low in saturated fats and salt, and includes whole grains, fruits, vegetables and lean protein   Exercise regularly (consult with your physician or cardiologist first); maintain a heart healthy weight.   If you smoke - quit (A resource to help you stop smoking is the [facility] – phone number 303-284-0163.). Continue to follow with your primary physician or cardiologist.   Seek medical help for dizziness, Lightheadedness, Blurry vision, Headache, Chest pain, Shortness of breath  Follow up with your medical doctor to establish long term blood pressure treatment goals.  Follow up with you primary care doctor 1 week    Diagnosis: T2DM (type 2 diabetes mellitus)  Assessment and Plan of Treatment: Make sure you get your HgA1c checked every three months.  If you take oral diabetes medications, check your blood glucose two times a day.  If you take insulin, check your blood glucose before meals and at bedtime.  It's important not to skip any meals.  Keep a log of your blood glucose results and always take it with you to your doctor appointments.  Keep a list of your current medications including injectables and over the counter medications and bring this medication list with you to all your doctor appointments.  If you have not seen your ophthalmologist this year call for appointment.  Check your feet daily for redness, sores, or openings. Do not self treat. If no improvement in two days call your primary care physician for an appointment.  Low blood sugar (hypoglycemia) is a blood sugar below 70mg/dl. Check your blood sugar if you feel signs/symptoms of hypoglycemia. If your blood sugar is below 70 take 15 grams of carbohydrates (ex 4 oz of apple juice, 3-4 glucose tablets, or 4-6 oz of regular soda) wait 15 minutes and repeat blood sugar to make sure it comes up above 70.  If your blood sugar is above 70 and you are due for a meal, have a meal.  If you are not due for a meal have a snack.  This snack helps keeps your blood sugar at a safe range.    Diagnosis: Atrial fibrillation  Assessment and Plan of Treatment: Atrial fibrillation is the most common heart rhythm problem.  The condition puts you at risk for has stroke and heart attack  It helps if you control your blood pressure, not drink more than 1-2 alcohol drinks per day, cut down on caffeine, getting treatment for over active thyroid gland, and get regular exercise  Call your doctor if you feel your heart racing or beating unusually, chest tightness or pain, lightheaded, faint, shortness of breath especially with exercise  It is important to take your heart medication as prescribed  You may be on anticoagulation which is very important to take as directed - you may need blood work to monitor drug levels    Diagnosis: Anemia  Assessment and Plan of Treatment: Normocytic anemia w/ Hgb of 10.4 on admission, decreased from 11.4 on 7/14/2023  - You deny any bloody BMs, no hematomas noted, ferritin and iron levels within normal limits please follow with repeat labs to trend with Dr. Garrett.

## 2023-07-20 NOTE — DISCHARGE NOTE PROVIDER - NSDCFUSCHEDAPPT_GEN_ALL_CORE_FT
Arnol Garrett  [facility] Physician Count includes the [facility]  CARDIOLOGY [address]   Scheduled Appointment: 07/24/2023     Arnol Garrett  [facility]  CARDIOLOGY [address]   Scheduled Appointment: 07/24/2023    Haycinth Ramos  [facility]  CARENAV Patient Gretchen  Scheduled Appointment: 07/27/2023

## 2023-07-20 NOTE — PHYSICAL THERAPY INITIAL EVALUATION ADULT - MANUAL MUSCLE TESTING RESULTS, REHAB EVAL
69F from [facility] with HTN, HLD, T2DM, uterine prolapse complicated by multiple UTIs (last with pansensitive E. coli), spinal stenosis s/p surgery in [facility] in 1990s with residual wheelchair bound status brought in by EMS following PEA arrest s/p ROSC and intubation in the field, likely due to aspiration event now complicated lack of responsiveness to tactile stimuli due to hypoxia and aspiration pneumonia. Patient's hospital course complicated by fever and bacteremia. Patient met criteria for brain death on 6/26, and is currently undergoing organ donation protocol    # Neuro: Met criteria for brain death. 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, was unresponsive to tactile stimuli prior to initiation of sedation  - CT head with no ICH or territorial infarct, suspicious for global hypoxia with subtle loss of gray/white differentiation  - Patient DC'd from sedation, remains unresponsive  - 24h EEG showed no epileptiform findings, but severe cerebral dysfunction  - Patient met criteria for brain death on 6/26    # CV: S/P PEA arrest but CK and high sensitivity troponin, only mildly elevated requiring minimal dosage of levophed due to sedation  - LV function appears preserved on POCUS  - On pressors    # Pulm: On mechanical ventilation, initially intubated for airway protection but also with likely aspiration pneumonia  - Consolidation on POCUS  - RRVP negative  - Sputum culture shows gram positive rods and cocci    # ID: Pt with sepsis due to aspiration pneumonia, WBC 22  - D/C zosyn (received 6/24-6/25)  - Received vancomycin x2 (6/24, 6/25)  - Blood cultures show gram negative rods, PCR+ E. 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 Per pt's daughter, self catheterization is for urgency.  - UA negative, but started a course of cipro 1 day ago  - Holding oxybutynin    # Endocrine: T2DM  - Check HgbA1c  - ISS  - Holding victoza    # Heme: Anemia to 9.6 with baseline in 10s, microcytic, iron studies consistent with iron deficiency anemia      Patient met criteria for brain death 6/26. Patient's family spoke with [facility] organ donation representative and agreed to organ donation. Orders placed for organ donation protocol as discussed with [facility] [employer] representative functionally at least 3+/5 t/o/grossly assessed due to

## 2023-07-20 NOTE — DISCHARGE NOTE NURSING/CASE MANAGEMENT/SOCIAL WORK - NSDCFUADDAPPT_GEN_ALL_CORE_FT
APPTS ARE READY TO BE MADE: [X] YES    Best Family or Patient Contact (if needed):    Additional Information about above appointments (if needed):    1: Cardiologist 1 week f/u  2: Primary care doctor 1 week with repeat labs to monitor kidney function and blood counts  3:     Other comments or requests:

## 2023-07-20 NOTE — DISCHARGE NOTE NURSING/CASE MANAGEMENT/SOCIAL WORK - NSDCPEFALRISK_GEN_ALL_CORE
For information on Fall & Injury Prevention, visit: [email or web address] OR  [email or web address] OR  https://www.cdc.gov/steadi/patient.html

## 2023-07-20 NOTE — PHYSICAL THERAPY INITIAL EVALUATION ADULT - GAIT PATTERN USED, PT EVAL
How Severe Are Your Spot(S)?: mild What Type Of Note Output Would You Prefer (Optional)?: Bullet Format What Is The Reason For Today's Visit?: Full Body Skin Examination What Is The Reason For Today's Visit? (Being Monitored For X): concerning skin lesions on an annual basis swing-through gait no apparent

## 2023-07-20 NOTE — DISCHARGE NOTE NURSING/CASE MANAGEMENT/SOCIAL WORK - PATIENT PORTAL LINK FT
You can access the FollowMyHealth Patient Portal offered by [facility] by registering at the following website: [email or web address]. By joining [facility]’s FollowMyHealth portal, you will also be able to view your health information using other applications (apps) compatible with our system.

## 2023-07-20 NOTE — DISCHARGE NOTE PROVIDER - CARE PROVIDER_API CALL
Arnol Garrett  Cardiology  [address], [address]  [place] [address]  Phone: [phone]  Fax: [phone]  Follow Up Time:    Arnol Garrett  Cardiology  [address], [address]  [place] [address]  Phone: [phone]  Fax: [phone]  Scheduled Appointment: 07/24/2023

## 2023-07-20 NOTE — DISCHARGE NOTE NURSING/CASE MANAGEMENT/SOCIAL WORK - NSDCVIVACCINE_GEN_ALL_CORE_FT
Tdap; 29-Oct-2022 13:54; Herbert Aguilar (NP); [employer]; [id number] (Exp. Date: 08-Dec-2024); IntraMuscular; Deltoid Left.; 0.5 milliLiter(s); VIS (VIS Published: 09-May-2013, VIS Presented: 29-Oct-2022);

## 2023-07-21 ENCOUNTER — NON-APPOINTMENT (OUTPATIENT)
Age: 83
End: 2023-07-21

## 2023-07-21 LAB
ANION GAP SERPL CALC-SCNC: 14 MMOL/L — SIGNIFICANT CHANGE UP (ref 5–17)
BASOPHILS # BLD AUTO: 0.14 K/UL — SIGNIFICANT CHANGE UP (ref 0–0.2)
BASOPHILS NFR BLD AUTO: 3.6 % — HIGH (ref 0–2)
BUN SERPL-MCNC: 24 MG/DL — HIGH (ref 7–23)
BURR CELLS BLD QL SMEAR: SIGNIFICANT CHANGE UP
CALCIUM SERPL-MCNC: 9.2 MG/DL — SIGNIFICANT CHANGE UP (ref 8.4–10.5)
CHLORIDE SERPL-SCNC: 106 MMOL/L — SIGNIFICANT CHANGE UP (ref 96–108)
CO2 SERPL-SCNC: 18 MMOL/L — LOW (ref 22–31)
CREAT SERPL-MCNC: 1.19 MG/DL — SIGNIFICANT CHANGE UP (ref 0.5–1.3)
EGFR: 46 ML/MIN/1.73M2 — LOW
ELLIPTOCYTES BLD QL SMEAR: SLIGHT — SIGNIFICANT CHANGE UP
EOSINOPHIL # BLD AUTO: 0.18 K/UL — SIGNIFICANT CHANGE UP (ref 0–0.5)
EOSINOPHIL NFR BLD AUTO: 4.6 % — SIGNIFICANT CHANGE UP (ref 0–6)
GLUCOSE BLDC GLUCOMTR-MCNC: 122 MG/DL — HIGH (ref 70–99)
GLUCOSE BLDC GLUCOMTR-MCNC: 178 MG/DL — HIGH (ref 70–99)
GLUCOSE BLDC GLUCOMTR-MCNC: 301 MG/DL — HIGH (ref 70–99)
GLUCOSE SERPL-MCNC: 202 MG/DL — HIGH (ref 70–99)
HCT VFR BLD CALC: 30.5 % — LOW (ref 34.5–45)
HGB BLD-MCNC: 10 G/DL — LOW (ref 11.5–15.5)
LYMPHOCYTES # BLD AUTO: 0.93 K/UL — LOW (ref 1–3.3)
LYMPHOCYTES # BLD AUTO: 23.6 % — SIGNIFICANT CHANGE UP (ref 13–44)
MANUAL SMEAR VERIFICATION: SIGNIFICANT CHANGE UP
MCHC RBC-ENTMCNC: 28 PG — SIGNIFICANT CHANGE UP (ref 27–34)
MCHC RBC-ENTMCNC: 32.8 GM/DL — SIGNIFICANT CHANGE UP (ref 32–36)
MCV RBC AUTO: 85.4 FL — SIGNIFICANT CHANGE UP (ref 80–100)
MONOCYTES # BLD AUTO: 0.39 K/UL — SIGNIFICANT CHANGE UP (ref 0–0.9)
MONOCYTES NFR BLD AUTO: 10 % — SIGNIFICANT CHANGE UP (ref 2–14)
NEUTROPHILS # BLD AUTO: 2.28 K/UL — SIGNIFICANT CHANGE UP (ref 1.8–7.4)
NEUTROPHILS NFR BLD AUTO: 57.3 % — SIGNIFICANT CHANGE UP (ref 43–77)
NEUTS BAND # BLD: 0.9 % — SIGNIFICANT CHANGE UP (ref 0–8)
PLAT MORPH BLD: NORMAL — SIGNIFICANT CHANGE UP
PLATELET # BLD AUTO: 250 K/UL — SIGNIFICANT CHANGE UP (ref 150–400)
POIKILOCYTOSIS BLD QL AUTO: SIGNIFICANT CHANGE UP
POTASSIUM SERPL-MCNC: 3.6 MMOL/L — SIGNIFICANT CHANGE UP (ref 3.5–5.3)
POTASSIUM SERPL-SCNC: 3.6 MMOL/L — SIGNIFICANT CHANGE UP (ref 3.5–5.3)
RBC # BLD: 3.57 M/UL — LOW (ref 3.8–5.2)
RBC # FLD: 12.5 % — SIGNIFICANT CHANGE UP (ref 10.3–14.5)
RBC BLD AUTO: ABNORMAL
SCHISTOCYTES BLD QL AUTO: SLIGHT — SIGNIFICANT CHANGE UP
SODIUM SERPL-SCNC: 138 MMOL/L — SIGNIFICANT CHANGE UP (ref 135–145)
TARGETS BLD QL SMEAR: SLIGHT — SIGNIFICANT CHANGE UP
WBC # BLD: 3.92 K/UL — SIGNIFICANT CHANGE UP (ref 3.8–10.5)
WBC # FLD AUTO: 3.92 K/UL — SIGNIFICANT CHANGE UP (ref 3.8–10.5)

## 2023-07-21 PROCEDURE — 93454 CORONARY ARTERY ANGIO S&I: CPT | Mod: 26

## 2023-07-21 PROCEDURE — 99152 MOD SED SAME PHYS/QHP 5/>YRS: CPT

## 2023-07-21 PROCEDURE — 99232 SBSQ HOSP IP/OBS MODERATE 35: CPT

## 2023-07-21 RX ORDER — SODIUM CHLORIDE 9 MG/ML
250 INJECTION INTRAMUSCULAR; INTRAVENOUS; SUBCUTANEOUS ONCE
Refills: 0 | Status: COMPLETED | OUTPATIENT
Start: 2023-07-21 | End: 2023-07-21

## 2023-07-21 RX ORDER — SODIUM CHLORIDE 9 MG/ML
1000 INJECTION INTRAMUSCULAR; INTRAVENOUS; SUBCUTANEOUS
Refills: 0 | Status: DISCONTINUED | OUTPATIENT
Start: 2023-07-21 | End: 2023-07-22

## 2023-07-21 RX ADMIN — INSULIN GLARGINE 9 UNIT(S): 100 INJECTION, SOLUTION SUBCUTANEOUS at 22:36

## 2023-07-21 RX ADMIN — BUDESONIDE AND FORMOTEROL FUMARATE DIHYDRATE 2 PUFF(S): 160; 4.5 AEROSOL RESPIRATORY (INHALATION) at 18:21

## 2023-07-21 RX ADMIN — BUDESONIDE AND FORMOTEROL FUMARATE DIHYDRATE 2 PUFF(S): 160; 4.5 AEROSOL RESPIRATORY (INHALATION) at 05:48

## 2023-07-21 RX ADMIN — Medication 50 MILLIGRAM(S): at 22:36

## 2023-07-21 RX ADMIN — MAGNESIUM OXIDE 400 MG ORAL TABLET 400 MILLIGRAM(S): 241.3 TABLET ORAL at 13:37

## 2023-07-21 RX ADMIN — AMLODIPINE BESYLATE 10 MILLIGRAM(S): 2.5 TABLET ORAL at 05:48

## 2023-07-21 RX ADMIN — VALSARTAN 320 MILLIGRAM(S): 80 TABLET ORAL at 05:47

## 2023-07-21 RX ADMIN — Medication 100 MILLIGRAM(S): at 05:47

## 2023-07-21 RX ADMIN — Medication 81 MILLIGRAM(S): at 13:37

## 2023-07-21 RX ADMIN — AMIODARONE HYDROCHLORIDE 200 MILLIGRAM(S): 400 TABLET ORAL at 05:47

## 2023-07-21 RX ADMIN — Medication 1: at 09:39

## 2023-07-21 RX ADMIN — ATORVASTATIN CALCIUM 40 MILLIGRAM(S): 80 TABLET, FILM COATED ORAL at 22:36

## 2023-07-21 RX ADMIN — SODIUM CHLORIDE 250 MILLILITER(S): 9 INJECTION INTRAMUSCULAR; INTRAVENOUS; SUBCUTANEOUS at 14:12

## 2023-07-21 RX ADMIN — Medication 3 UNIT(S): at 09:39

## 2023-07-21 RX ADMIN — SODIUM CHLORIDE 75 MILLILITER(S): 9 INJECTION INTRAMUSCULAR; INTRAVENOUS; SUBCUTANEOUS at 14:11

## 2023-07-21 RX ADMIN — CLOPIDOGREL BISULFATE 75 MILLIGRAM(S): 75 TABLET, FILM COATED ORAL at 13:37

## 2023-07-22 VITALS
RESPIRATION RATE: 18 BRPM | HEART RATE: 58 BPM | SYSTOLIC BLOOD PRESSURE: 131 MMHG | OXYGEN SATURATION: 97 % | DIASTOLIC BLOOD PRESSURE: 69 MMHG | TEMPERATURE: 98 F

## 2023-07-22 LAB
ANION GAP SERPL CALC-SCNC: 12 MMOL/L — SIGNIFICANT CHANGE UP (ref 5–17)
BUN SERPL-MCNC: 17 MG/DL — SIGNIFICANT CHANGE UP (ref 7–23)
CALCIUM SERPL-MCNC: 9.3 MG/DL — SIGNIFICANT CHANGE UP (ref 8.4–10.5)
CHLORIDE SERPL-SCNC: 107 MMOL/L — SIGNIFICANT CHANGE UP (ref 96–108)
CO2 SERPL-SCNC: 19 MMOL/L — LOW (ref 22–31)
CREAT SERPL-MCNC: 0.99 MG/DL — SIGNIFICANT CHANGE UP (ref 0.5–1.3)
EGFR: 57 ML/MIN/1.73M2 — LOW
GLUCOSE BLDC GLUCOMTR-MCNC: 178 MG/DL — HIGH (ref 70–99)
GLUCOSE BLDC GLUCOMTR-MCNC: 189 MG/DL — HIGH (ref 70–99)
GLUCOSE SERPL-MCNC: 156 MG/DL — HIGH (ref 70–99)
HCT VFR BLD CALC: 30.8 % — LOW (ref 34.5–45)
HGB BLD-MCNC: 10.3 G/DL — LOW (ref 11.5–15.5)
MCHC RBC-ENTMCNC: 28.4 PG — SIGNIFICANT CHANGE UP (ref 27–34)
MCHC RBC-ENTMCNC: 33.4 GM/DL — SIGNIFICANT CHANGE UP (ref 32–36)
MCV RBC AUTO: 84.8 FL — SIGNIFICANT CHANGE UP (ref 80–100)
NRBC # BLD: 0 /100 WBCS — SIGNIFICANT CHANGE UP (ref 0–0)
PLATELET # BLD AUTO: 258 K/UL — SIGNIFICANT CHANGE UP (ref 150–400)
POTASSIUM SERPL-MCNC: 3.5 MMOL/L — SIGNIFICANT CHANGE UP (ref 3.5–5.3)
POTASSIUM SERPL-SCNC: 3.5 MMOL/L — SIGNIFICANT CHANGE UP (ref 3.5–5.3)
RBC # BLD: 3.63 M/UL — LOW (ref 3.8–5.2)
RBC # FLD: 12.4 % — SIGNIFICANT CHANGE UP (ref 10.3–14.5)
SODIUM SERPL-SCNC: 138 MMOL/L — SIGNIFICANT CHANGE UP (ref 135–145)
WBC # BLD: 4.02 K/UL — SIGNIFICANT CHANGE UP (ref 3.8–10.5)
WBC # FLD AUTO: 4.02 K/UL — SIGNIFICANT CHANGE UP (ref 3.8–10.5)

## 2023-07-22 PROCEDURE — 96374 THER/PROPH/DIAG INJ IV PUSH: CPT

## 2023-07-22 PROCEDURE — C1894: CPT

## 2023-07-22 PROCEDURE — 0225U NFCT DS DNA&RNA 21 SARSCOV2: CPT

## 2023-07-22 PROCEDURE — 93005 ELECTROCARDIOGRAM TRACING: CPT

## 2023-07-22 PROCEDURE — 84466 ASSAY OF TRANSFERRIN: CPT

## 2023-07-22 PROCEDURE — 85730 THROMBOPLASTIN TIME PARTIAL: CPT

## 2023-07-22 PROCEDURE — 85610 PROTHROMBIN TIME: CPT

## 2023-07-22 PROCEDURE — 94640 AIRWAY INHALATION TREATMENT: CPT

## 2023-07-22 PROCEDURE — 84484 ASSAY OF TROPONIN QUANT: CPT

## 2023-07-22 PROCEDURE — 82962 GLUCOSE BLOOD TEST: CPT

## 2023-07-22 PROCEDURE — 93454 CORONARY ARTERY ANGIO S&I: CPT

## 2023-07-22 PROCEDURE — 97161 PT EVAL LOW COMPLEX 20 MIN: CPT

## 2023-07-22 PROCEDURE — 99239 HOSP IP/OBS DSCHRG MGMT >30: CPT

## 2023-07-22 PROCEDURE — 83605 ASSAY OF LACTIC ACID: CPT

## 2023-07-22 PROCEDURE — 84132 ASSAY OF SERUM POTASSIUM: CPT

## 2023-07-22 PROCEDURE — 85018 HEMOGLOBIN: CPT

## 2023-07-22 PROCEDURE — 82330 ASSAY OF CALCIUM: CPT

## 2023-07-22 PROCEDURE — 93306 TTE W/DOPPLER COMPLETE: CPT

## 2023-07-22 PROCEDURE — 80053 COMPREHEN METABOLIC PANEL: CPT

## 2023-07-22 PROCEDURE — 99285 EMERGENCY DEPT VISIT HI MDM: CPT | Mod: 25

## 2023-07-22 PROCEDURE — 82803 BLOOD GASES ANY COMBINATION: CPT

## 2023-07-22 PROCEDURE — 93010 ELECTROCARDIOGRAM REPORT: CPT

## 2023-07-22 PROCEDURE — C1887: CPT

## 2023-07-22 PROCEDURE — 76376 3D RENDER W/INTRP POSTPROCES: CPT

## 2023-07-22 PROCEDURE — 83550 IRON BINDING TEST: CPT

## 2023-07-22 PROCEDURE — 85025 COMPLETE CBC W/AUTO DIFF WBC: CPT

## 2023-07-22 PROCEDURE — 80048 BASIC METABOLIC PNL TOTAL CA: CPT

## 2023-07-22 PROCEDURE — 85014 HEMATOCRIT: CPT

## 2023-07-22 PROCEDURE — 83880 ASSAY OF NATRIURETIC PEPTIDE: CPT

## 2023-07-22 PROCEDURE — 84295 ASSAY OF SERUM SODIUM: CPT

## 2023-07-22 PROCEDURE — 82728 ASSAY OF FERRITIN: CPT

## 2023-07-22 PROCEDURE — 82947 ASSAY GLUCOSE BLOOD QUANT: CPT

## 2023-07-22 PROCEDURE — 83036 HEMOGLOBIN GLYCOSYLATED A1C: CPT

## 2023-07-22 PROCEDURE — 85379 FIBRIN DEGRADATION QUANT: CPT

## 2023-07-22 PROCEDURE — 81001 URINALYSIS AUTO W/SCOPE: CPT

## 2023-07-22 PROCEDURE — 71045 X-RAY EXAM CHEST 1 VIEW: CPT

## 2023-07-22 PROCEDURE — 85027 COMPLETE CBC AUTOMATED: CPT

## 2023-07-22 PROCEDURE — 83540 ASSAY OF IRON: CPT

## 2023-07-22 PROCEDURE — C1769: CPT

## 2023-07-22 PROCEDURE — 96375 TX/PRO/DX INJ NEW DRUG ADDON: CPT

## 2023-07-22 PROCEDURE — 36415 COLL VENOUS BLD VENIPUNCTURE: CPT

## 2023-07-22 PROCEDURE — 82435 ASSAY OF BLOOD CHLORIDE: CPT

## 2023-07-22 RX ADMIN — BUDESONIDE AND FORMOTEROL FUMARATE DIHYDRATE 2 PUFF(S): 160; 4.5 AEROSOL RESPIRATORY (INHALATION) at 05:55

## 2023-07-22 RX ADMIN — Medication 3 UNIT(S): at 13:27

## 2023-07-22 RX ADMIN — VALSARTAN 320 MILLIGRAM(S): 80 TABLET ORAL at 05:55

## 2023-07-22 RX ADMIN — Medication 3 UNIT(S): at 09:24

## 2023-07-22 RX ADMIN — AMLODIPINE BESYLATE 10 MILLIGRAM(S): 2.5 TABLET ORAL at 05:55

## 2023-07-22 RX ADMIN — CLOPIDOGREL BISULFATE 75 MILLIGRAM(S): 75 TABLET, FILM COATED ORAL at 12:40

## 2023-07-22 RX ADMIN — Medication 81 MILLIGRAM(S): at 12:40

## 2023-07-22 RX ADMIN — Medication 1: at 13:26

## 2023-07-22 RX ADMIN — Medication 1: at 09:23

## 2023-07-22 RX ADMIN — AMIODARONE HYDROCHLORIDE 200 MILLIGRAM(S): 400 TABLET ORAL at 05:54

## 2023-07-22 RX ADMIN — Medication 100 MILLIGRAM(S): at 05:55

## 2023-07-22 RX ADMIN — MAGNESIUM OXIDE 400 MG ORAL TABLET 400 MILLIGRAM(S): 241.3 TABLET ORAL at 12:40

## 2023-07-22 NOTE — PROGRESS NOTE ADULT - PROBLEM SELECTOR PLAN 7
Not in an acute exacerbation at this time. On RA.   - Continue Symbicort 2 puffs BID

## 2023-07-22 NOTE — PROGRESS NOTE ADULT - ASSESSMENT
81 y/o female w/ PMHx CAD s/p PCI (mLAD, LCx and RCA), Afib on Eliquis, tachy-morgan syndrome s/p PPM, COPD, TIA, HTN, HLD, and T2DM who presents for 3 days of persistent chest discomfort with elevated troponins on exam and T-wave changes in V2-6. Admitted for management of Type I NSTEMI.
83 y/o female w/ PMHx CAD s/p PCI (mLAD, LCx and RCA), Afib on Eliquis, tachy-morgan syndrome s/p PPM, COPD, TIA, HTN, HLD, and T2DM who presents for 3 days of persistent chest discomfort with elevated troponins on exam and T-wave changes in V2-6. Admitted for management of Type I NSTEMI.
 83 y/o female w/ PMHx CAD s/p PCI (mLAD, LCx and RCA), Afib on Eliquis, tachy-morgan syndrome s/p PPM, COPD, TIA, HTN, HLD, and T2DM who presents for 3 days of persistent chest discomfort with elevated troponins on exam and T-wave changes in V2-6. Admitted for management of Type I NSTEMI.

## 2023-07-22 NOTE — PROGRESS NOTE ADULT - PROBLEM SELECTOR PLAN 5
- Continue Amlodipine 2.5mg daily  - Continue valsartan 320mg daily

## 2023-07-22 NOTE — PROGRESS NOTE ADULT - PROBLEM SELECTOR PLAN 2
- Normocytic anemia w/ Hb 10.4 on admission, decreased from 11.4 on 7/14. No s/s of acute bleeding.   - Monitor CBC
- Normocytic anemia w/ Hb 10.4 on admission, decreased from 11.4 on 7/14. No s/s of acute bleeding.   - Monitor CBC
- Normocytic anemia w/ Hb 10.4 on admission, decreased from 11.4 on 7/14  - Patient denies any bloody BMs, no hematomas noted  - Will check iron, TIBC, transferrin and ferritin levels

## 2023-07-22 NOTE — PROGRESS NOTE ADULT - PROBLEM SELECTOR PROBLEM 1
Type 1 non-ST elevation myocardial infarction (NSTEMI)

## 2023-07-22 NOTE — PROGRESS NOTE ADULT - SUBJECTIVE AND OBJECTIVE BOX
DATE OF SERVICE: 07-20-23 @ 16:35    Patient is a 82y old  Female who presents with a chief complaint of Type 1 NSTEMI (20 Jul 2023 16:12)      INTERVAL HISTORY: Feels ok.     REVIEW OF SYSTEMS:  CONSTITUTIONAL: No weakness  EYES/ENT: No visual changes;  No throat pain   NECK: No pain or stiffness  RESPIRATORY: No cough, wheezing; No shortness of breath  CARDIOVASCULAR: No chest pain or palpitations  GASTROINTESTINAL: No abdominal  pain. No nausea, vomiting, or hematemesis  GENITOURINARY: No dysuria, frequency or hematuria  NEUROLOGICAL: No stroke like symptoms  SKIN: No rashes    TELEMETRY Personally reviewed: SR/AP 60s  	  MEDICATIONS:  aMIOdarone    Tablet 200 milliGRAM(s) Oral daily  amLODIPine   Tablet 10 milliGRAM(s) Oral daily  metoprolol succinate  milliGRAM(s) Oral daily  metoprolol succinate ER 50 milliGRAM(s) Oral at bedtime  nitroglycerin     SubLingual 0.4 milliGRAM(s) SubLingual every 5 minutes PRN  valsartan 320 milliGRAM(s) Oral daily        PHYSICAL EXAM:  T(C): 36.6 (07-20-23 @ 11:37), Max: 36.9 (07-19-23 @ 16:46)  HR: 60 (07-20-23 @ 15:00) (60 - 63)  BP: 120/75 (07-20-23 @ 15:00) (120/75 - 158/80)  RR: 18 (07-20-23 @ 11:37) (17 - 18)  SpO2: 97% (07-20-23 @ 15:00) (94% - 97%)  Wt(kg): --  I&O's Summary    19 Jul 2023 07:01  -  20 Jul 2023 07:00  --------------------------------------------------------  IN: 260 mL / OUT: 0 mL / NET: 260 mL          Appearance: In no distress	  HEENT:    PERRL, EOMI	  Cardiovascular:  S1 S2, No JVD  Respiratory: Lungs clear to auscultation	  Gastrointestinal:  Soft, Non-tender, + BS	  Vascularature:  No edema of LE  Psychiatric: Appropriate affect   Neuro: no acute focal deficits                               10.7   3.83  )-----------( 236      ( 20 Jul 2023 07:05 )             31.8     07-19    140  |  106  |  14  ----------------------------<  84  4.3   |  20<L>  |  1.03    Ca    9.3      19 Jul 2023 07:31    TPro  6.9  /  Alb  3.9  /  TBili  0.9  /  DBili  x   /  AST  29  /  ALT  35  /  AlkPhos  115  07-19        Labs personally reviewed      ASSESSMENT/PLAN: 	    81 y/o female w/ PMHx CAD s/p PCI (mLAD, LCx and RCA), Afib on Eliquis, tachy-morgan syndrome s/p PPM, COPD, TIA, HTN, HLD, and T2DM. Patent reports fatigue, feeling of fullness and chest pressure for 3 days. OP provider directed pt to ED, on presentation elevated troponin and EKG revealing T-wave changes in V2-6. Admitted for management of Type I NSTEMI.      Problem/Plan - 1:  ·  Problem: Type 1 non-ST elevation myocardial infarction (NSTEMI).   - EKG revealing T wave inversions in V2 and V3 as well as T-wave flattening in V4-V6 which was a change from initial EKG.  - Troponin elevated 150-->147-->139  - Continue Aspirin 81mg and Plavix 75mg daily   - Spoke to patient at length regarding catheterization, remains concerned given this is 4th catheterization this year and would like to think further about proceeding   - TTE with preserved EF, no WMA  - Plan for cardiac cath tomorrow afternoon. Discussed with patient and in agreement.     Problem/Plan - 2:  ·  Problem: Atrial fibrillation.   - Continue amiodarone 200mg  - Continue metoprolol succinate 100mg daily in AM and 50mg at bedtime  - Heparin gtt DC  - Received 1 dose of Eliquis which will now DC in prep for cardiac cath    Problem/Plan - 3:  ·  Problem: Hyperlipidemia   - Continue Atorvastatin 40mg daily.    Problem/Plan - 4:  ·  Problem: Hypertension  - Continue Amlodipine 2.5mg daily  - Continue valsartan 320mg daily.      Problem/Plan - 5:  ·  Problem: CAD  - S/p PCI multiple stents to mLAD, LCx and RCA  - Continue Atorvastatin 40mg PO daily   - Plan for cath tomorrow        HAIM Mckeon-YOVANNY Gambino DO [facility]  Cardiovascular Medicine  [address] [place] [address], [address]  Available through call or text on [employer] TE[employer]  Office: [phone]  
DATE OF SERVICE: 07-21-23 @ 15:32    Patient is a 82y old  Female who presents with a chief complaint of Type 1 NSTEMI (21 Jul 2023 13:19)      INTERVAL HISTORY: Feels well, denies chest pain. Son at bedside    REVIEW OF SYSTEMS:  CONSTITUTIONAL: No weakness  EYES/ENT: No visual changes;  No throat pain   NECK: No pain or stiffness  RESPIRATORY: No cough, wheezing; No shortness of breath  CARDIOVASCULAR: No chest pain or palpitations  GASTROINTESTINAL: No abdominal  pain. No nausea, vomiting, or hematemesis  GENITOURINARY: No dysuria, frequency or hematuria  NEUROLOGICAL: No stroke like symptoms  SKIN: No rashes    TELEMETRY Personally reviewed: A paced 60-70  	  MEDICATIONS:  aMIOdarone    Tablet 200 milliGRAM(s) Oral daily  amLODIPine   Tablet 10 milliGRAM(s) Oral daily  metoprolol succinate ER 50 milliGRAM(s) Oral at bedtime  metoprolol succinate  milliGRAM(s) Oral daily  nitroglycerin     SubLingual 0.4 milliGRAM(s) SubLingual every 5 minutes PRN  valsartan 320 milliGRAM(s) Oral daily        PHYSICAL EXAM:  T(C): 36.7 (07-21-23 @ 14:22), Max: 37 (07-20-23 @ 21:09)  HR: 60 (07-21-23 @ 14:22) (59 - 60)  BP: 166/77 (07-21-23 @ 14:22) (118/62 - 166/77)  RR: 14 (07-21-23 @ 14:22) (14 - 18)  SpO2: 100% (07-21-23 @ 14:22) (95% - 100%)  Wt(kg): --  I&O's Summary    20 Jul 2023 07:01  -  21 Jul 2023 07:00  --------------------------------------------------------  IN: 290 mL / OUT: 0 mL / NET: 290 mL          Appearance: In no distress	  HEENT:    PERRL, EOMI	  Cardiovascular:  S1 S2, No JVD  Respiratory: Lungs clear to auscultation	  Gastrointestinal:  Soft, Non-tender, + BS	  Vascularature:  No edema of LE  Psychiatric: Appropriate affect   Neuro: no acute focal deficits                               10.0   3.92  )-----------( 250      ( 21 Jul 2023 07:17 )             30.5     07-21    138  |  106  |  24<H>  ----------------------------<  202<H>  3.6   |  18<L>  |  1.19    Ca    9.2      21 Jul 2023 07:16          Labs personally reviewed      ASSESSMENT/PLAN: 	  83 y/o female w/ PMHx CAD s/p PCI (mLAD, LCx and RCA), Afib on Eliquis, tachy-morgan syndrome s/p PPM, COPD, TIA, HTN, HLD, and T2DM. Patent reports fatigue, feeling of fullness and chest pressure for 3 days. OP provider directed pt to ED, on presentation elevated troponin and EKG revealing T-wave changes in V2-6. Admitted for management of Type I NSTEMI.      Problem/Plan - 1:  ·  Problem: Type 1 non-ST elevation myocardial infarction (NSTEMI).   - EKG revealing T wave inversions in V2 and V3 as well as T-wave flattening in V4-V6 which was a change from initial EKG.  - Troponin elevated 150-->147-->139  - Continue Aspirin 81mg and Plavix 75mg daily   - Spoke to patient at length regarding catheterization, remains concerned given this is 4th catheterization this year and would like to think further about proceeding   - TTE with preserved EF, no WMA  - Plan for cardiac cath today. Discussed with patient and son both in agreement.     Problem/Plan - 2:  ·  Problem: Atrial fibrillation.   - Continue amiodarone 200mg  - Continue metoprolol succinate 100mg daily in AM and 50mg at bedtime  - Heparin gtt DC  - Received 1 dose of Eliquis which will now DC in prep for cardiac cath    Problem/Plan - 3:  ·  Problem: Hyperlipidemia   - Continue Atorvastatin 40mg daily.    Problem/Plan - 4:  ·  Problem: Hypertension  - Continue Amlodipine 2.5mg daily  - Continue valsartan 320mg daily.      Problem/Plan - 5:  ·  Problem: CAD  - S/p PCI multiple stents to mLAD, LCx and RCA  - Continue Atorvastatin 40mg PO daily   - Plan for cath tomorrow            ZAC Alves, DO [facility]  Cardiovascular Medicine  [address] [facility], [address]  Available via call or text on [employer] TEAMs  Office: [phone]  
DATE OF SERVICE: 07-22-23 @ 10:03    Patient is a 82y old  Female who presents with a chief complaint of Type 1 NSTEMI (21 Jul 2023 15:32)      INTERVAL HISTORY: Feels ok.     REVIEW OF SYSTEMS:  CONSTITUTIONAL: No weakness  EYES/ENT: No visual changes;  No throat pain   NECK: No pain or stiffness  RESPIRATORY: No cough, wheezing; No shortness of breath  CARDIOVASCULAR: No chest pain or palpitations  GASTROINTESTINAL: No abdominal  pain. No nausea, vomiting, or hematemesis  GENITOURINARY: No dysuria, frequency or hematuria  NEUROLOGICAL: No stroke like symptoms  SKIN: No rashes    TELEMETRY Personally reviewed: A-Paced 60s  	  MEDICATIONS:  aMIOdarone    Tablet 200 milliGRAM(s) Oral daily  amLODIPine   Tablet 10 milliGRAM(s) Oral daily  metoprolol succinate  milliGRAM(s) Oral daily  metoprolol succinate ER 50 milliGRAM(s) Oral at bedtime  nitroglycerin     SubLingual 0.4 milliGRAM(s) SubLingual every 5 minutes PRN  valsartan 320 milliGRAM(s) Oral daily        PHYSICAL EXAM:  T(C): 36.5 (07-22-23 @ 04:49), Max: 36.8 (07-21-23 @ 23:51)  HR: 60 (07-22-23 @ 04:49) (60 - 64)  BP: 123/65 (07-22-23 @ 04:49) (123/65 - 169/78)  RR: 18 (07-22-23 @ 04:49) (14 - 18)  SpO2: 94% (07-22-23 @ 04:49) (94% - 100%)  Wt(kg): --  I&O's Summary    21 Jul 2023 07:01  -  22 Jul 2023 07:00  --------------------------------------------------------  IN: 300 mL / OUT: 0 mL / NET: 300 mL          Appearance: In no distress	  HEENT:    PERRL, EOMI	  Cardiovascular:  S1 S2, No JVD  Respiratory: Lungs clear to auscultation	  Gastrointestinal:  Soft, Non-tender, + BS	  Vascularature:  No edema of LE  Psychiatric: Appropriate affect   Neuro: no acute focal deficits                               10.3   4.02  )-----------( 258      ( 22 Jul 2023 07:15 )             30.8     07-22    138  |  107  |  17  ----------------------------<  156<H>  3.5   |  19<L>  |  0.99    Ca    9.3      22 Jul 2023 07:15          Labs personally reviewed      ASSESSMENT/PLAN: 	    83 y/o female w/ PMHx CAD s/p PCI (mLAD, LCx and RCA), Afib on Eliquis, tachy-morgan syndrome s/p PPM, COPD, TIA, HTN, HLD, and T2DM. Patent reports fatigue, feeling of fullness and chest pressure for 3 days. OP provider directed pt to ED, on presentation elevated troponin and EKG revealing T-wave changes in V2-6. Admitted for management of Type I NSTEMI.      Problem/Plan - 1:  ·  Problem: Type 1 non-ST elevation myocardial infarction (NSTEMI).   - EKG revealing T wave inversions in V2 and V3 as well as T-wave flattening in V4-V6 which was a change from initial EKG.  - Troponin elevated 150-->147-->139  - Continue Aspirin 81mg and Plavix 75mg daily   - TTE with preserved EF, no WMA  - s/p cath with no new blockages    Problem/Plan - 2:  ·  Problem: Atrial fibrillation.   - Continue amiodarone 200mg  - Continue metoprolol succinate 100mg daily in AM and 50mg at bedtime  - Resume Eliquis.     Problem/Plan - 3:  ·  Problem: Hyperlipidemia   - Continue Atorvastatin 40mg daily.    Problem/Plan - 4:  ·  Problem: Hypertension  - Continue Amlodipine 2.5mg daily  - Continue valsartan 320mg daily.      Problem/Plan - 5:  ·  Problem: CAD  - S/p PCI multiple stents to mLAD, LCx and RCA  - Continue Atorvastatin 40mg PO daily   - s/p cath with no new blockages              HAIM Mckeon-NP   Del Gambino DO [facility]  Cardiovascular Medicine  [address] [facility], [address]  Available through call or text on [employer] TEAM[employer]  Office: [phone]  
SUBJECTIVE / OVERNIGHT EVENTS:  Seen and examined at bedside. NAC. Denies CP, SOB, abd pain.     MEDICATIONS  (STANDING):  aMIOdarone    Tablet 200 milliGRAM(s) Oral daily  amLODIPine   Tablet 10 milliGRAM(s) Oral daily  apixaban 5 milliGRAM(s) Oral two times a day  aspirin enteric coated 81 milliGRAM(s) Oral daily  atorvastatin 40 milliGRAM(s) Oral at bedtime  budesonide 160 MICROgram(s)/formoterol 4.5 MICROgram(s) Inhaler 2 Puff(s) Inhalation two times a day  clopidogrel Tablet 75 milliGRAM(s) Oral daily  dextrose 5%. 1000 milliLiter(s) (100 mL/Hr) IV Continuous <Continuous>  dextrose 5%. 1000 milliLiter(s) (50 mL/Hr) IV Continuous <Continuous>  dextrose 50% Injectable 25 Gram(s) IV Push once  dextrose 50% Injectable 12.5 Gram(s) IV Push once  dextrose 50% Injectable 25 Gram(s) IV Push once  glucagon  Injectable 1 milliGRAM(s) IntraMuscular once  insulin glargine Injectable (LANTUS) 9 Unit(s) SubCutaneous at bedtime  insulin lispro (ADMELOG) corrective regimen sliding scale   SubCutaneous three times a day before meals  insulin lispro Injectable (ADMELOG) 3 Unit(s) SubCutaneous three times a day before meals  magnesium oxide 400 milliGRAM(s) Oral daily  metoprolol succinate  milliGRAM(s) Oral daily  metoprolol succinate ER 50 milliGRAM(s) Oral at bedtime  valsartan 320 milliGRAM(s) Oral daily    MEDICATIONS  (PRN):  dextrose Oral Gel 15 Gram(s) Oral once PRN Blood Glucose LESS THAN 70 milliGRAM(s)/deciliter  nitroglycerin     SubLingual 0.4 milliGRAM(s) SubLingual every 5 minutes PRN Chest Pain      I&O's Summary    19 Jul 2023 07:01  -  20 Jul 2023 07:00  --------------------------------------------------------  IN: 260 mL / OUT: 0 mL / NET: 260 mL        PHYSICAL EXAM:  Vital Signs Last 24 Hrs  T(C): 36.6 (20 Jul 2023 11:37), Max: 36.9 (19 Jul 2023 16:46)  T(F): 97.9 (20 Jul 2023 11:37), Max: 98.4 (19 Jul 2023 16:46)  HR: 60 (20 Jul 2023 15:00) (60 - 63)  BP: 120/75 (20 Jul 2023 15:00) (120/75 - 158/80)  BP(mean): --  RR: 18 (20 Jul 2023 11:37) (17 - 18)  SpO2: 97% (20 Jul 2023 15:00) (94% - 97%)    Parameters below as of 20 Jul 2023 15:00  Patient On (Oxygen Delivery Method): room air      CONSTITUTIONAL: Well-groomed, in no apparent distress  EYES: No conjunctival or scleral injection, non-icteric  NECK: Supple; Trachea midline  RESPIRATORY: Breathing comfortably; lungs CTA without wheeze  CARDIOVASCULAR: +S1S2, RRR; no lower extremity edema  GASTROINTESTINAL: No tenderness, +BS throughout, no rebound/guarding  MUSCULOSKELETAL: moves all extremities   NEUROLOGIC: CN II-XII grossly intact  PSYCHIATRIC: A+O x 3; mood and affect appropriate; appropriate insight and judgment       LABS:                         10.0   3.92  )-----------( 250      ( 21 Jul 2023 07:17 )             30.5     07-21    138  |  106  |  24<H>  ----------------------------<  202<H>  3.6   |  18<L>  |  1.19    Ca    9.2      21 Jul 2023 07:16      PTT - ( 20 Jul 2023 07:06 )  PTT:43.9 sec  Urinalysis Basic - ( 21 Jul 2023 07:16 )    Color: x / Appearance: x / SG: x / pH: x  Gluc: 202 mg/dL / Ketone: x  / Bili: x / Urobili: x   Blood: x / Protein: x / Nitrite: x   Leuk Esterase: x / RBC: x / WBC x   Sq Epi: x / Non Sq Epi: x / Bacteria: x
SUBJECTIVE / OVERNIGHT EVENTS:  Seen and examined at bedside. NAC. Denies CP, SOB, abd pain.     MEDICATIONS  (STANDING):  aMIOdarone    Tablet 200 milliGRAM(s) Oral daily  amLODIPine   Tablet 10 milliGRAM(s) Oral daily  apixaban 5 milliGRAM(s) Oral two times a day  aspirin enteric coated 81 milliGRAM(s) Oral daily  atorvastatin 40 milliGRAM(s) Oral at bedtime  budesonide 160 MICROgram(s)/formoterol 4.5 MICROgram(s) Inhaler 2 Puff(s) Inhalation two times a day  clopidogrel Tablet 75 milliGRAM(s) Oral daily  dextrose 5%. 1000 milliLiter(s) (100 mL/Hr) IV Continuous <Continuous>  dextrose 5%. 1000 milliLiter(s) (50 mL/Hr) IV Continuous <Continuous>  dextrose 50% Injectable 25 Gram(s) IV Push once  dextrose 50% Injectable 12.5 Gram(s) IV Push once  dextrose 50% Injectable 25 Gram(s) IV Push once  glucagon  Injectable 1 milliGRAM(s) IntraMuscular once  insulin glargine Injectable (LANTUS) 9 Unit(s) SubCutaneous at bedtime  insulin lispro (ADMELOG) corrective regimen sliding scale   SubCutaneous three times a day before meals  insulin lispro Injectable (ADMELOG) 3 Unit(s) SubCutaneous three times a day before meals  magnesium oxide 400 milliGRAM(s) Oral daily  metoprolol succinate  milliGRAM(s) Oral daily  metoprolol succinate ER 50 milliGRAM(s) Oral at bedtime  valsartan 320 milliGRAM(s) Oral daily    MEDICATIONS  (PRN):  dextrose Oral Gel 15 Gram(s) Oral once PRN Blood Glucose LESS THAN 70 milliGRAM(s)/deciliter  nitroglycerin     SubLingual 0.4 milliGRAM(s) SubLingual every 5 minutes PRN Chest Pain      I&O's Summary    19 Jul 2023 07:01  -  20 Jul 2023 07:00  --------------------------------------------------------  IN: 260 mL / OUT: 0 mL / NET: 260 mL        PHYSICAL EXAM:  Vital Signs Last 24 Hrs  T(C): 36.6 (20 Jul 2023 11:37), Max: 36.9 (19 Jul 2023 16:46)  T(F): 97.9 (20 Jul 2023 11:37), Max: 98.4 (19 Jul 2023 16:46)  HR: 60 (20 Jul 2023 15:00) (60 - 63)  BP: 120/75 (20 Jul 2023 15:00) (120/75 - 158/80)  BP(mean): --  RR: 18 (20 Jul 2023 11:37) (17 - 18)  SpO2: 97% (20 Jul 2023 15:00) (94% - 97%)    Parameters below as of 20 Jul 2023 15:00  Patient On (Oxygen Delivery Method): room air      CONSTITUTIONAL: Well-groomed, in no apparent distress  EYES: No conjunctival or scleral injection, non-icteric  NECK: Supple; Trachea midline  RESPIRATORY: Breathing comfortably; lungs CTA without wheeze  CARDIOVASCULAR: +S1S2, RRR; no lower extremity edema  GASTROINTESTINAL: No tenderness, +BS throughout, no rebound/guarding  MUSCULOSKELETAL: moves all extremities   NEUROLOGIC: CN II-XII grossly intact  PSYCHIATRIC: A+O x 3; mood and affect appropriate; appropriate insight and judgment    LABS:                         10.3   4.02  )-----------( 258      ( 22 Jul 2023 07:15 )             30.8     07-22    138  |  107  |  17  ----------------------------<  156<H>  3.5   |  19<L>  |  0.99    Ca    9.3      22 Jul 2023 07:15        Urinalysis Basic - ( 22 Jul 2023 07:15 )    Color: x / Appearance: x / SG: x / pH: x  Gluc: 156 mg/dL / Ketone: x  / Bili: x / Urobili: x   Blood: x / Protein: x / Nitrite: x   Leuk Esterase: x / RBC: x / WBC x   Sq Epi: x / Non Sq Epi: x / Bacteria: x
SUBJECTIVE / OVERNIGHT EVENTS:  Seen and examined at bedside. NAC. Denies CP, SOB, abd pain.     MEDICATIONS  (STANDING):  aMIOdarone    Tablet 200 milliGRAM(s) Oral daily  amLODIPine   Tablet 10 milliGRAM(s) Oral daily  apixaban 5 milliGRAM(s) Oral two times a day  aspirin enteric coated 81 milliGRAM(s) Oral daily  atorvastatin 40 milliGRAM(s) Oral at bedtime  budesonide 160 MICROgram(s)/formoterol 4.5 MICROgram(s) Inhaler 2 Puff(s) Inhalation two times a day  clopidogrel Tablet 75 milliGRAM(s) Oral daily  dextrose 5%. 1000 milliLiter(s) (100 mL/Hr) IV Continuous <Continuous>  dextrose 5%. 1000 milliLiter(s) (50 mL/Hr) IV Continuous <Continuous>  dextrose 50% Injectable 25 Gram(s) IV Push once  dextrose 50% Injectable 12.5 Gram(s) IV Push once  dextrose 50% Injectable 25 Gram(s) IV Push once  glucagon  Injectable 1 milliGRAM(s) IntraMuscular once  insulin glargine Injectable (LANTUS) 9 Unit(s) SubCutaneous at bedtime  insulin lispro (ADMELOG) corrective regimen sliding scale   SubCutaneous three times a day before meals  insulin lispro Injectable (ADMELOG) 3 Unit(s) SubCutaneous three times a day before meals  magnesium oxide 400 milliGRAM(s) Oral daily  metoprolol succinate  milliGRAM(s) Oral daily  metoprolol succinate ER 50 milliGRAM(s) Oral at bedtime  valsartan 320 milliGRAM(s) Oral daily    MEDICATIONS  (PRN):  dextrose Oral Gel 15 Gram(s) Oral once PRN Blood Glucose LESS THAN 70 milliGRAM(s)/deciliter  nitroglycerin     SubLingual 0.4 milliGRAM(s) SubLingual every 5 minutes PRN Chest Pain      I&O's Summary    19 Jul 2023 07:01  -  20 Jul 2023 07:00  --------------------------------------------------------  IN: 260 mL / OUT: 0 mL / NET: 260 mL        PHYSICAL EXAM:  Vital Signs Last 24 Hrs  T(C): 36.6 (20 Jul 2023 11:37), Max: 36.9 (19 Jul 2023 16:46)  T(F): 97.9 (20 Jul 2023 11:37), Max: 98.4 (19 Jul 2023 16:46)  HR: 60 (20 Jul 2023 15:00) (60 - 63)  BP: 120/75 (20 Jul 2023 15:00) (120/75 - 158/80)  BP(mean): --  RR: 18 (20 Jul 2023 11:37) (17 - 18)  SpO2: 97% (20 Jul 2023 15:00) (94% - 97%)    Parameters below as of 20 Jul 2023 15:00  Patient On (Oxygen Delivery Method): room air      CONSTITUTIONAL: Well-groomed, in no apparent distress  EYES: No conjunctival or scleral injection, non-icteric  NECK: Supple; Trachea midline  RESPIRATORY: Breathing comfortably; lungs CTA without wheeze  CARDIOVASCULAR: +S1S2, RRR; no lower extremity edema  GASTROINTESTINAL: No tenderness, +BS throughout, no rebound/guarding  MUSCULOSKELETAL: moves all extremities   NEUROLOGIC: CN II-XII grossly intact  PSYCHIATRIC: A+O x 3; mood and affect appropriate; appropriate insight and judgment    LABS:                        10.7   3.83  )-----------( 236      ( 20 Jul 2023 07:05 )             31.8     07-19    140  |  106  |  14  ----------------------------<  84  4.3   |  20<L>  |  1.03    Ca    9.3      19 Jul 2023 07:31    TPro  6.9  /  Alb  3.9  /  TBili  0.9  /  DBili  x   /  AST  29  /  ALT  35  /  AlkPhos  115  07-19    PT/INR - ( 19 Jul 2023 07:31 )   PT: 17.4 sec;   INR: 1.49 ratio         PTT - ( 20 Jul 2023 07:06 )  PTT:43.9 sec      Urinalysis Basic - ( 19 Jul 2023 07:31 )    Color: x / Appearance: x / SG: x / pH: x  Gluc: 84 mg/dL / Ketone: x  / Bili: x / Urobili: x   Blood: x / Protein: x / Nitrite: x   Leuk Esterase: x / RBC: x / WBC x   Sq Epi: x / Non Sq Epi: x / Bacteria: x        SARS-CoV-2: NotDetec (18 Jul 2023 21:57)  SARS-CoV-2: NotDetec (09 May 2023 14:50)      RADIOLOGY & ADDITIONAL TESTS:  New Imaging Personally Reviewed Today:  New Electrocardiogram Personally Reviewed Today:  Other Results Reviewed Today:   Prior or Outpatient Records Reviewed Today with Summary:    COORDINATION OF CARE:  Consultant Communication and Details of Discussion (where applicable):

## 2023-07-22 NOTE — PROGRESS NOTE ADULT - PROBLEM SELECTOR PLAN 3
- Continue amiodarone 200mg  - Continue metoprolol succinate 100mg daily in AM and 50mg at bedtime  - Eliquis 5mg BID
- Continue amiodarone 200mg  - Continue metoprolol succinate 100mg daily in AM and 50mg at bedtime  - Eliquis 5mg BID
- Continue amiodarone 200mg  - Continue metoprolol succinate 100mg daily in AM and 50mg at bedtime  - Hold Eliquis 5mg BID pending cath

## 2023-07-22 NOTE — PROGRESS NOTE ADULT - PROBLEM SELECTOR PLAN 1
Currently CP free. Troponin peaked   -D/c hep gtt   -TTE:  Normal left ventricular cavity size. The left ventricular wall thickness is normal. The left ventricular systolic function is normal with an ejection fraction of 56%. There are no regional wall motion abnormalities seen.  - Continue Aspirin 81mg and plavix 75mg daily  - D/w cardiology, pt pending cardiac cath
Currently CP free. Troponin peaked. S/p hep gtt.   -TTE:  Normal left ventricular cavity size. The left ventricular wall thickness is normal. The left ventricular systolic function is normal with an ejection fraction of 56%. There are no regional wall motion abnormalities seen.  - Cards following   - Continue Aspirin 81mg and plavix 75mg daily  - S/p cardiac cath - s/p dx LHC: luminal irregularities via RRA
Currently CP free. Troponin peaked   -D/c hep gtt   -TTE:  Normal left ventricular cavity size. The left ventricular wall thickness is normal. The left ventricular systolic function is normal with an ejection fraction of 56 % by Duffy's method of disks. There are no regional wall motion abnormalities seen.  - Continue Aspirin 81mg and plavix 75mg daily  - D/w cardiology, pt had initially refused cardiac cath. Cards to d/w pt re: NST. If she does not want to pursue stress test/cath, will plan for d/c with outpt f/u with Dr. Garrett

## 2023-07-22 NOTE — PROGRESS NOTE ADULT - PROBLEM SELECTOR PLAN 6
- Patient takes Repaglinide, metformin, and Victoza at home  - Will do insulin basal-bolus regimen while inpatient - 9u lantus + 3u Lispro TID before meals + sliding scale

## 2023-07-22 NOTE — PROGRESS NOTE ADULT - PROBLEM SELECTOR PLAN 4
- Continue Atorvastatin 40mg daily

## 2023-07-23 ENCOUNTER — TRANSCRIPTION ENCOUNTER (OUTPATIENT)
Age: 83
End: 2023-07-23

## 2023-07-24 ENCOUNTER — APPOINTMENT (OUTPATIENT)
Dept: CARDIOLOGY | Facility: CLINIC | Age: 83
End: 2023-07-24
Payer: MEDICARE

## 2023-07-24 ENCOUNTER — NON-APPOINTMENT (OUTPATIENT)
Age: 83
End: 2023-07-24

## 2023-07-24 ENCOUNTER — APPOINTMENT (OUTPATIENT)
Dept: PULMONOLOGY | Facility: CLINIC | Age: 83
End: 2023-07-24
Payer: MEDICARE

## 2023-07-24 ENCOUNTER — TRANSCRIPTION ENCOUNTER (OUTPATIENT)
Age: 83
End: 2023-07-24

## 2023-07-24 ENCOUNTER — APPOINTMENT (OUTPATIENT)
Dept: PULMONOLOGY | Facility: CLINIC | Age: 83
End: 2023-07-24

## 2023-07-24 VITALS
TEMPERATURE: 98.2 F | HEIGHT: 66 IN | OXYGEN SATURATION: 99 % | BODY MASS INDEX: 23.14 KG/M2 | HEART RATE: 61 BPM | WEIGHT: 144 LBS | DIASTOLIC BLOOD PRESSURE: 62 MMHG | SYSTOLIC BLOOD PRESSURE: 120 MMHG

## 2023-07-24 PROCEDURE — 99213 OFFICE O/P EST LOW 20 MIN: CPT | Mod: 25

## 2023-07-24 PROCEDURE — 95012 NITRIC OXIDE EXP GAS DETER: CPT

## 2023-07-24 PROCEDURE — 93000 ELECTROCARDIOGRAM COMPLETE: CPT

## 2023-07-24 PROCEDURE — 99214 OFFICE O/P EST MOD 30 MIN: CPT

## 2023-07-24 PROCEDURE — 94010 BREATHING CAPACITY TEST: CPT

## 2023-07-24 PROCEDURE — 71046 X-RAY EXAM CHEST 2 VIEWS: CPT

## 2023-07-24 RX ORDER — UMECLIDINIUM BROMIDE AND VILANTEROL TRIFENATATE 62.5; 25 UG/1; UG/1
62.5-25 POWDER RESPIRATORY (INHALATION)
Qty: 1 | Refills: 5 | Status: DISCONTINUED | COMMUNITY
Start: 2021-09-08 | End: 2023-07-24

## 2023-07-24 RX ORDER — PREDNISONE 20 MG/1
20 TABLET ORAL
Qty: 10 | Refills: 0 | Status: ACTIVE | COMMUNITY
Start: 2023-07-24 | End: 1900-01-01

## 2023-07-24 RX ORDER — ALBUTEROL SULFATE 90 UG/1
108 (90 BASE) INHALANT RESPIRATORY (INHALATION)
Qty: 1 | Refills: 1 | Status: ACTIVE | COMMUNITY
Start: 2023-07-24 | End: 1900-01-01

## 2023-07-24 NOTE — ASSESSMENT
[FreeTextEntry1] : Ms. COMPA ROJAS is an 82 year old female with COPD.  No evidence of pneumonia on current x-ray.  Spirometry is a bit worse than before and I have given her a 5-day course of prednisone.  She is to follow-up with her PCP

## 2023-07-24 NOTE — PROCEDURE
[FreeTextEntry1] : Chest x-ray showed clear lungs, no signs of infiltrate or pleural effusions. \par \par Spirometry demonstrates interval decline

## 2023-07-24 NOTE — ADDENDUM
[FreeTextEntry1] : I, Evelina Ortizrupinder, acted solely as a scribe for Dr. Aman Nunez M.D. on this date 07/24/2023. \par \par All medical record entries made by the Scribe were at my, Dr. Aman Nunez M.D., direction and personally dictated by me on 07/24/2023. I have reviewed the chart and agree that the record accurately reflects my personal performance of the history, physical exam, assessment and plan. I have also personally directed, reviewed, and agreed with the chart.

## 2023-07-24 NOTE — REASON FOR VISIT
[Cough] : cough [COPD] : COPD [Acute] : an acute visit [Abnormal CXR/ Chest CT] : an abnormal CXR/ chest CT

## 2023-07-24 NOTE — HISTORY OF PRESENT ILLNESS
[FreeTextEntry1] : This is a 83 y/o female with a pmhx of TIA, Afib, PPM, HLD, HTN, DM, Emphysema CAD s/p PCI 4/3/2023, cath on 7/14 Successful PCI with NAVEEN to the mid LAD. The stents in the LCX and RCA were patent here for f/u pt last seen on 7/18 with more SOB sent for repeat cath 7/21/23- Coronary angiography demonstrated minor luminal irregularities  patent stents to the lad and lcx \par pt reports yesterday she felt her pacemaker \par Denies any chest pain. continue to reports sob when she bends down

## 2023-07-24 NOTE — HISTORY OF PRESENT ILLNESS
[Former] : former [Never] : never [TextBox_4] : COMPA ROJAS is a 82 year old female, with history of COPD, anemia, atrial fibrillation, Type 2 Diabetes mellitus, who presents to the office for follow up evaluation. Patient reports of having a productive cough with phlegm. She states that she has stopped taking her Anoro Ellipta and has started taking Symbicort which was given to her during a previous hospitalization\par \par Currently hospitalized last week and noted to have abnormal chest x-ray no particular action was taken about it.  She did undergo a cardiac catheterization

## 2023-07-27 ENCOUNTER — APPOINTMENT (OUTPATIENT)
Dept: CARE COORDINATION | Facility: HOME HEALTH | Age: 83
End: 2023-07-27
Payer: MEDICARE

## 2023-07-27 VITALS
BODY MASS INDEX: 23.3 KG/M2 | HEART RATE: 60 BPM | SYSTOLIC BLOOD PRESSURE: 110 MMHG | RESPIRATION RATE: 16 BRPM | HEIGHT: 66 IN | OXYGEN SATURATION: 99 % | TEMPERATURE: 97.7 F | DIASTOLIC BLOOD PRESSURE: 62 MMHG | WEIGHT: 145 LBS

## 2023-07-27 DIAGNOSIS — J43.9 EMPHYSEMA, UNSPECIFIED: ICD-10-CM

## 2023-07-27 DIAGNOSIS — I25.2 OLD MYOCARDIAL INFARCTION: ICD-10-CM

## 2023-07-27 PROCEDURE — 99349 HOME/RES VST EST MOD MDM 40: CPT

## 2023-07-27 RX ORDER — ADHESIVE TAPE 3"X 2.3 YD
200 TAPE, NON-MEDICATED TOPICAL
Qty: 7 | Refills: 0 | Status: DISCONTINUED | COMMUNITY
Start: 2023-05-26 | End: 2023-07-27

## 2023-07-27 RX ORDER — FUROSEMIDE 20 MG/1
20 TABLET ORAL
Qty: 30 | Refills: 1 | Status: DISCONTINUED | COMMUNITY
Start: 2023-06-16 | End: 2023-07-27

## 2023-07-27 RX ORDER — BENZONATATE 200 MG/1
200 CAPSULE ORAL
Qty: 40 | Refills: 0 | Status: DISCONTINUED | COMMUNITY
Start: 2023-05-16 | End: 2023-07-27

## 2023-07-28 PROBLEM — J43.9 EMPHYSEMA LUNG: Status: ACTIVE | Noted: 2021-04-24

## 2023-07-28 PROBLEM — I25.2 HISTORY OF NON-ST ELEVATION MYOCARDIAL INFARCTION (NSTEMI): Status: ACTIVE | Noted: 2023-07-28

## 2023-07-28 NOTE — HISTORY OF PRESENT ILLNESS
[Post-hospitalization from ___ Hospital] : Post-hospitalization from [unfilled] Hospital [Admitted on: ___] : The patient was admitted on [unfilled] [Discharged on ___] : discharged on [unfilled] [Discharge Summary] : discharge summary [Discharge Med List] : discharge medication list [Med Reconciliation] : medication reconciliation has been completed [Patient Contacted By: ____] : and contacted by [unfilled] [FreeTextEntry2] : 81 y/o female w/ PMHx CAD s/p PCI (mLAD, LCx and RCA), Afib on Eliquis, tachy-morgan syndrome s/p PPM, COPD, TIA, HTN, HLD, and T2DM who presents for 3 days of persistent chest discomfort with elevated troponins on exam and T-wave changes in V2-6. Admitted for management of Type I NSTEMI. EKG revealing T wave inversions in V2 and V3 as well as T-wave flattening in V4-V6 which was a change from initial EKG.\par Troponin elevated 150-->147-->139. Started on a Heparin gtt and continued on Aspirin 81mg and Plavix 75mg daily. Cardiology called and spoke to patient at length regarding catheterization, patient concerned given this is 4th catheterization this year and initially did not want to proceed with cath at this time. Patient is currently chest pain free, Trops have peaked. TTE  Normal left ventricular cavity size. The left ventricular wall thickness is normal. The left ventricular systolic function is normal with an ejection fraction of 56 %. Compared to the transthoracic echocardiogram performed on 5/10/2023 there have been no significant interval changes. S/p dx LHC: luminal irregularities via RRA 7/21. Patient is medically optimized for discharge home with outpatient follow up with Dr. Garrett within 1 week of discharge.\par 81 yo female seen today for TCM visit. She is awake, alert and oriented x 3 in no acute distress. Breathing is even and unlabored.  Seen by Dr Nunez and Dr. Garrett on 7/24/23. Albuterol and prednisone added.\par [facility] SOC done w/2 visits, she had PT x 1 and has follow up tomorrow. Son takes pt to appts or she uses access a ride. \par Pt educated on TCM, yellow card left in the home.  \par

## 2023-07-28 NOTE — REVIEW OF SYSTEMS
[Chills] : chills [Shortness Of Breath] : shortness of breath [Cough] : cough [Dyspnea on Exertion] : dyspnea on exertion [Constipation] : constipation [Negative] : Heme/Lymph [Fever] : no fever [Night Sweats] : no night sweats [Abdominal Pain] : no abdominal pain [Diarrhea] : diarrhea [Vomiting] : no vomiting [Melena] : no melena [FreeTextEntry2] : chills sometimes [FreeTextEntry6] : white phlegm and SOB when bending over QUEZADA After 1/2 block

## 2023-07-28 NOTE — PHYSICAL EXAM
[No Acute Distress] : no acute distress [Well Nourished] : well nourished [Well Developed] : well developed [Well-Appearing] : well-appearing [Normal Voice/Communication] : normal voice/communication [PERRL] : pupils equal round and reactive to light [Normal Outer Ear/Nose] : the outer ears and nose were normal in appearance [No JVD] : no jugular venous distention [No Respiratory Distress] : no respiratory distress  [Clear to Auscultation] : lungs were clear to auscultation bilaterally [No Accessory Muscle Use] : no accessory muscle use [Normal Rate] : normal rate  [Regular Rhythm] : with a regular rhythm [Normal S1, S2] : normal S1 and S2 [No Varicosities] : no varicosities [Pedal Pulses Present] : the pedal pulses are present [No Edema] : there was no peripheral edema [No Extremity Clubbing/Cyanosis] : no extremity clubbing/cyanosis [Soft] : abdomen soft [Non Tender] : non-tender [Non-distended] : non-distended [Normal Bowel Sounds] : normal bowel sounds [No Spinal Tenderness] : no spinal tenderness [No Rash] : no rash [Normal Gait] : normal gait [No Focal Deficits] : no focal deficits [Memory Grossly Normal] : memory grossly normal [Normal Affect] : the affect was normal [Normal Mood] : the mood was normal [Normal Insight/Judgement] : insight and judgment were intact [Kyphosis] : no kyphosis [de-identified] : PPM

## 2023-07-28 NOTE — HEALTH RISK ASSESSMENT
[No] : In the past 12 months have you used drugs other than those required for medical reasons? No [One fall no injury in past year] : Patient reported one fall in the past year without injury [0] : 2) Feeling down, depressed, or hopeless: Not at all (0) [PHQ-2 Negative - No further assessment needed] : PHQ-2 Negative - No further assessment needed [Diabetic Diet] : diabetic [Low Salt Diet] : low salt [General Adherence] : general adherence [None] : None [Alone] : lives alone [Retired] : retired [High School] : high school [# Of Children ___] : has [unfilled] children [Feels Safe at Home] : Feels safe at home [Fully functional (bathing, dressing, toileting, transferring, walking, feeding)] : Fully functional (bathing, dressing, toileting, transferring, walking, feeding) [Independent] : managing finances [Some assistance needed] : doing laundry [Reports normal functional visual acuity (ie: able to read med bottle)] : Reports normal functional visual acuity [Smoke Detector] : smoke detector [Carbon Monoxide Detector] : carbon monoxide detector [With Patient/Caregiver] : , with patient/caregiver [Designated Healthcare Proxy] : Designated healthcare proxy [Name: ___] : Health Care Proxy's Name: [unfilled]  [Relationship: ___] : Relationship: [unfilled] [Aggressive treatment] : aggressive treatment [DNI] : DNI [I will adhere to the patient's wishes.] : I will adhere to the patient's wishes. [Time Spent: ___ minutes] : Time Spent: [unfilled] minutes [Never] : Never [de-identified] : PT [de-identified] : low salt [[id number]] : 0 [Change in mental status noted] : No change in mental status noted [Language] : denies difficulty with language [Behavior] : denies difficulty with behavior [Learning/Retaining New Information] : denies difficulty learning/retaining new information [Handling Complex Tasks] : denies difficulty handling complex tasks [Reasoning] : denies difficulty with reasoning [Spatial Ability and Orientation] : denies difficulty with spatial ability and orientation [Reports changes in hearing] : Reports no changes in hearing [Reports changes in vision] : Reports no changes in vision [Reports changes in dental health] : Reports no changes in dental health [AdvancecareDate] : 07/23

## 2023-08-02 DIAGNOSIS — R74.8 ABNORMAL LEVELS OF OTHER SERUM ENZYMES: ICD-10-CM

## 2023-08-02 LAB
ALBUMIN SERPL ELPH-MCNC: 4.6 G/DL
ALP BLD-CCNC: 119 U/L
ALT SERPL-CCNC: 37 U/L
ANION GAP SERPL CALC-SCNC: 16 MMOL/L
AST SERPL-CCNC: 30 U/L
BUN SERPL-MCNC: 24 MG/DL
CALCIUM SERPL-MCNC: 10.3 MG/DL
CHLORIDE SERPL-SCNC: 100 MMOL/L
CO2 SERPL-SCNC: 20 MMOL/L
CREAT SERPL-MCNC: 1.18 MG/DL
EGFR: 46 ML/MIN/1.73M2
FERRITIN SERPL-MCNC: 91 NG/ML
FOLATE SERPL-MCNC: 12.9 NG/ML
GGT SERPL-CCNC: 55 U/L
GLUCOSE SERPL-MCNC: 125 MG/DL
IRON SATN MFR SERPL: 17 %
IRON SERPL-MCNC: 59 UG/DL
NT-PROBNP SERPL-MCNC: 2647 PG/ML
POTASSIUM SERPL-SCNC: 4.4 MMOL/L
SODIUM SERPL-SCNC: 136 MMOL/L
T4 FREE SERPL-MCNC: 1.9 NG/DL
TIBC SERPL-MCNC: 358 UG/DL
TRANSFERRIN SERPL-MCNC: 282 MG/DL
TSH SERPL-ACNC: 1.07 UIU/ML
UIBC SERPL-MCNC: 299 UG/DL
VIT B12 SERPL-MCNC: 496 PG/ML

## 2023-08-03 ENCOUNTER — TRANSCRIPTION ENCOUNTER (OUTPATIENT)
Age: 83
End: 2023-08-03

## 2023-08-04 ENCOUNTER — TRANSCRIPTION ENCOUNTER (OUTPATIENT)
Age: 83
End: 2023-08-04

## 2023-08-13 ENCOUNTER — RX RENEWAL (OUTPATIENT)
Age: 83
End: 2023-08-13

## 2023-08-17 ENCOUNTER — TRANSCRIPTION ENCOUNTER (OUTPATIENT)
Age: 83
End: 2023-08-17

## 2023-08-18 ENCOUNTER — NON-APPOINTMENT (OUTPATIENT)
Age: 83
End: 2023-08-18

## 2023-08-21 ENCOUNTER — LABORATORY RESULT (OUTPATIENT)
Age: 83
End: 2023-08-21

## 2023-08-22 ENCOUNTER — LABORATORY RESULT (OUTPATIENT)
Age: 83
End: 2023-08-22

## 2023-08-26 ENCOUNTER — RX RENEWAL (OUTPATIENT)
Age: 83
End: 2023-08-26

## 2023-08-28 ENCOUNTER — APPOINTMENT (OUTPATIENT)
Dept: CARDIOLOGY | Facility: CLINIC | Age: 83
End: 2023-08-28
Payer: MEDICARE

## 2023-08-28 ENCOUNTER — RESULT REVIEW (OUTPATIENT)
Age: 83
End: 2023-08-28

## 2023-08-28 VITALS
SYSTOLIC BLOOD PRESSURE: 118 MMHG | TEMPERATURE: 98 F | OXYGEN SATURATION: 99 % | HEIGHT: 66 IN | HEART RATE: 60 BPM | WEIGHT: 139.25 LBS | BODY MASS INDEX: 22.38 KG/M2 | DIASTOLIC BLOOD PRESSURE: 50 MMHG

## 2023-08-28 DIAGNOSIS — R06.02 SHORTNESS OF BREATH: ICD-10-CM

## 2023-08-28 PROCEDURE — 99214 OFFICE O/P EST MOD 30 MIN: CPT

## 2023-08-28 PROCEDURE — 93000 ELECTROCARDIOGRAM COMPLETE: CPT

## 2023-08-28 NOTE — HISTORY OF PRESENT ILLNESS
[FreeTextEntry1] : This is a 81 y/o female with a pmhx of TIA, Afib, PPM, HLD, HTN, DM, Emphysema CAD s/p PCI 4/3/2023, cath on 7/14 Successful PCI with NAVEEN to the mid LAD. Repeat cath 7/21/23- Coronary angiography demonstrated minor luminal irregularities patent stents to the lad and lcx here for f/u  Denies any chest pain. continue to reports sob when she bends down pt with ocassional fatigue

## 2023-09-08 NOTE — PATIENT PROFILE ADULT - PATIENT'S PREFERRED PRONOUN
ID [id number] used, UA nonactionable, discussed with patient, pain not improved, plan for CT lumbar spine, reassess CT Lumbar nonactionable, showing No acute fracture or traumatic malalignment. Mild degenerative changes without significant canal or foraminal stenosis by CT technique.  Focal fat stranding in the left hemipelvis adjacent to the sigmoid colon without associated wall thickening or diverticula in the region. This may represent the sequela of fat necrosis/epiploic appendagitis and is age-indeterminate. Discussed results with patient, plan to follow up with primary doctor. All questions answered. CT Lumbar nonactionable, showing No acute fracture or traumatic malalignment. Mild degenerative changes without significant canal or foraminal stenosis by CT technique.  Focal fat stranding in the left hemipelvis adjacent to the sigmoid colon without associated wall thickening or diverticula in the region. This may represent the sequela of fat necrosis/epiploic appendagitis and is age-indeterminate. Discussed results with patient,  ID [id number] used, plan to follow up with primary doctor and pain management. All questions answered. Her/She

## 2023-09-09 LAB
ALBUMIN SERPL ELPH-MCNC: 3.8 G/DL
ALP BLD-CCNC: 90 U/L
ALT SERPL-CCNC: 17 U/L
ANION GAP SERPL CALC-SCNC: 16 MMOL/L
AST SERPL-CCNC: 21 U/L
BILIRUB SERPL-MCNC: 0.4 MG/DL
BUN SERPL-MCNC: 18 MG/DL
CALCIUM SERPL-MCNC: 9.5 MG/DL
CHLORIDE SERPL-SCNC: 99 MMOL/L
CO2 SERPL-SCNC: 21 MMOL/L
CREAT SERPL-MCNC: 1.42 MG/DL
EGFR: 37 ML/MIN/1.73M2
ESTIMATED AVERAGE GLUCOSE: 166 MG/DL
GLUCOSE SERPL-MCNC: 279 MG/DL
HBA1C MFR BLD HPLC: 7.4 %
POTASSIUM SERPL-SCNC: 4 MMOL/L
PROT SERPL-MCNC: 7.1 G/DL
SODIUM SERPL-SCNC: 136 MMOL/L

## 2023-09-12 ENCOUNTER — APPOINTMENT (OUTPATIENT)
Dept: CT IMAGING | Facility: IMAGING CENTER | Age: 83
End: 2023-09-12
Payer: MEDICARE

## 2023-09-12 ENCOUNTER — OUTPATIENT (OUTPATIENT)
Dept: OUTPATIENT SERVICES | Facility: HOSPITAL | Age: 83
LOS: 1 days | End: 2023-09-12
Payer: MEDICARE

## 2023-09-12 DIAGNOSIS — Z95.5 PRESENCE OF CORONARY ANGIOPLASTY IMPLANT AND GRAFT: Chronic | ICD-10-CM

## 2023-09-12 DIAGNOSIS — R63.4 ABNORMAL WEIGHT LOSS: ICD-10-CM

## 2023-09-12 PROCEDURE — 74177 CT ABD & PELVIS W/CONTRAST: CPT

## 2023-09-12 PROCEDURE — 74177 CT ABD & PELVIS W/CONTRAST: CPT | Mod: 26,MH

## 2023-09-12 PROCEDURE — 71260 CT THORAX DX C+: CPT | Mod: 26,MH

## 2023-09-12 PROCEDURE — 71260 CT THORAX DX C+: CPT

## 2023-09-19 ENCOUNTER — RX RENEWAL (OUTPATIENT)
Age: 83
End: 2023-09-19

## 2023-09-22 ENCOUNTER — APPOINTMENT (OUTPATIENT)
Dept: CARDIOLOGY | Facility: CLINIC | Age: 83
End: 2023-09-22
Payer: MEDICARE

## 2023-09-22 VITALS
RESPIRATION RATE: 15 BRPM | SYSTOLIC BLOOD PRESSURE: 142 MMHG | TEMPERATURE: 98 F | OXYGEN SATURATION: 98 % | BODY MASS INDEX: 22.82 KG/M2 | WEIGHT: 142 LBS | HEART RATE: 69 BPM | HEIGHT: 66 IN | DIASTOLIC BLOOD PRESSURE: 78 MMHG

## 2023-09-22 DIAGNOSIS — R93.89 ABNORMAL FINDINGS ON DIAGNOSTIC IMAGING OF OTHER SPECIFIED BODY STRUCTURES: ICD-10-CM

## 2023-09-22 DIAGNOSIS — R79.9 ABNORMAL FINDING OF BLOOD CHEMISTRY, UNSPECIFIED: ICD-10-CM

## 2023-09-22 PROCEDURE — 93000 ELECTROCARDIOGRAM COMPLETE: CPT

## 2023-09-22 PROCEDURE — 99214 OFFICE O/P EST MOD 30 MIN: CPT

## 2023-10-04 ENCOUNTER — APPOINTMENT (OUTPATIENT)
Dept: GASTROENTEROLOGY | Facility: CLINIC | Age: 83
End: 2023-10-04
Payer: MEDICARE

## 2023-10-04 VITALS
BODY MASS INDEX: 22.5 KG/M2 | HEIGHT: 66 IN | TEMPERATURE: 97.3 F | OXYGEN SATURATION: 100 % | HEART RATE: 60 BPM | SYSTOLIC BLOOD PRESSURE: 161 MMHG | WEIGHT: 140 LBS | DIASTOLIC BLOOD PRESSURE: 89 MMHG

## 2023-10-04 DIAGNOSIS — R10.31 RIGHT LOWER QUADRANT PAIN: ICD-10-CM

## 2023-10-04 DIAGNOSIS — K59.09 OTHER CONSTIPATION: ICD-10-CM

## 2023-10-04 PROCEDURE — 99204 OFFICE O/P NEW MOD 45 MIN: CPT

## 2023-10-04 RX ORDER — DOCUSATE SODIUM 100 MG/1
100 CAPSULE ORAL
Qty: 60 | Refills: 3 | Status: ACTIVE | COMMUNITY
Start: 2023-10-04 | End: 1900-01-01

## 2023-10-13 NOTE — H&P CARDIOLOGY - GASTROINTESTINAL DETAILS
Subjective   Patient ID: Lucio Hernandez is a 89 y.o. male who presents for 3 month check up. Labs completed. Has been walking 2x weekly with his daughters. Has increased his water intake.     HPI     Review of Systems    Objective   There were no vitals taken for this visit.    Physical Exam    Assessment/Plan           normal/soft/nontender

## 2023-10-18 ENCOUNTER — RX RENEWAL (OUTPATIENT)
Age: 83
End: 2023-10-18

## 2023-10-23 ENCOUNTER — APPOINTMENT (OUTPATIENT)
Dept: ULTRASOUND IMAGING | Facility: IMAGING CENTER | Age: 83
End: 2023-10-23
Payer: MEDICARE

## 2023-10-23 ENCOUNTER — OUTPATIENT (OUTPATIENT)
Dept: OUTPATIENT SERVICES | Facility: HOSPITAL | Age: 83
LOS: 1 days | End: 2023-10-23
Payer: MEDICARE

## 2023-10-23 ENCOUNTER — RESULT REVIEW (OUTPATIENT)
Age: 83
End: 2023-10-23

## 2023-10-23 DIAGNOSIS — Z95.5 PRESENCE OF CORONARY ANGIOPLASTY IMPLANT AND GRAFT: Chronic | ICD-10-CM

## 2023-10-23 DIAGNOSIS — R93.89 ABNORMAL FINDINGS ON DIAGNOSTIC IMAGING OF OTHER SPECIFIED BODY STRUCTURES: ICD-10-CM

## 2023-10-23 PROCEDURE — 93975 VASCULAR STUDY: CPT

## 2023-10-23 PROCEDURE — 93975 VASCULAR STUDY: CPT | Mod: 26

## 2023-10-25 ENCOUNTER — APPOINTMENT (OUTPATIENT)
Dept: NEPHROLOGY | Facility: CLINIC | Age: 83
End: 2023-10-25

## 2023-11-08 ENCOUNTER — APPOINTMENT (OUTPATIENT)
Dept: UROLOGY | Facility: CLINIC | Age: 83
End: 2023-11-08
Payer: MEDICARE

## 2023-11-08 VITALS
DIASTOLIC BLOOD PRESSURE: 88 MMHG | TEMPERATURE: 97.6 F | BODY MASS INDEX: 23.3 KG/M2 | OXYGEN SATURATION: 99 % | HEART RATE: 60 BPM | SYSTOLIC BLOOD PRESSURE: 182 MMHG | RESPIRATION RATE: 16 BRPM | HEIGHT: 66 IN | WEIGHT: 145 LBS

## 2023-11-08 PROCEDURE — 99203 OFFICE O/P NEW LOW 30 MIN: CPT

## 2023-11-09 ENCOUNTER — NON-APPOINTMENT (OUTPATIENT)
Age: 83
End: 2023-11-09

## 2023-11-09 ENCOUNTER — APPOINTMENT (OUTPATIENT)
Dept: INTERNAL MEDICINE | Facility: CLINIC | Age: 83
End: 2023-11-09
Payer: MEDICARE

## 2023-11-09 ENCOUNTER — APPOINTMENT (OUTPATIENT)
Dept: PULMONOLOGY | Facility: CLINIC | Age: 83
End: 2023-11-09
Payer: MEDICARE

## 2023-11-09 VITALS — SYSTOLIC BLOOD PRESSURE: 190 MMHG | DIASTOLIC BLOOD PRESSURE: 95 MMHG

## 2023-11-09 VITALS
BODY MASS INDEX: 23.3 KG/M2 | HEART RATE: 99 BPM | DIASTOLIC BLOOD PRESSURE: 80 MMHG | OXYGEN SATURATION: 99 % | HEIGHT: 66 IN | WEIGHT: 145 LBS | SYSTOLIC BLOOD PRESSURE: 180 MMHG

## 2023-11-09 DIAGNOSIS — N20.0 CALCULUS OF KIDNEY: ICD-10-CM

## 2023-11-09 DIAGNOSIS — I25.10 ATHEROSCLEROTIC HEART DISEASE OF NATIVE CORONARY ARTERY W/OUT ANGINA PECTORIS: ICD-10-CM

## 2023-11-09 PROCEDURE — 93000 ELECTROCARDIOGRAM COMPLETE: CPT

## 2023-11-09 PROCEDURE — 99214 OFFICE O/P EST MOD 30 MIN: CPT

## 2023-11-09 PROCEDURE — 71046 X-RAY EXAM CHEST 2 VIEWS: CPT

## 2023-11-10 LAB
ALBUMIN SERPL ELPH-MCNC: 4.5 G/DL
ALP BLD-CCNC: 85 U/L
ALT SERPL-CCNC: 18 U/L
ANION GAP SERPL CALC-SCNC: 12 MMOL/L
AST SERPL-CCNC: 21 U/L
BASOPHILS # BLD AUTO: 0.06 K/UL
BASOPHILS NFR BLD AUTO: 0.9 %
BILIRUB SERPL-MCNC: 0.4 MG/DL
BUN SERPL-MCNC: 18 MG/DL
CALCIUM SERPL-MCNC: 9.9 MG/DL
CHLORIDE SERPL-SCNC: 101 MMOL/L
CK SERPL-CCNC: 88 U/L
CO2 SERPL-SCNC: 25 MMOL/L
CREAT SERPL-MCNC: 1.1 MG/DL
EGFR: 50 ML/MIN/1.73M2
EOSINOPHIL # BLD AUTO: 0.12 K/UL
EOSINOPHIL NFR BLD AUTO: 1.7 %
GLUCOSE SERPL-MCNC: 170 MG/DL
HCT VFR BLD CALC: 36.1 %
HGB BLD-MCNC: 11.5 G/DL
IMM GRANULOCYTES NFR BLD AUTO: 0.3 %
LYMPHOCYTES # BLD AUTO: 2.03 K/UL
LYMPHOCYTES NFR BLD AUTO: 29.2 %
MAN DIFF?: NORMAL
MCHC RBC-ENTMCNC: 27.1 PG
MCHC RBC-ENTMCNC: 31.9 GM/DL
MCV RBC AUTO: 85.1 FL
MONOCYTES # BLD AUTO: 0.87 K/UL
MONOCYTES NFR BLD AUTO: 12.5 %
NEUTROPHILS # BLD AUTO: 3.85 K/UL
NEUTROPHILS NFR BLD AUTO: 55.4 %
PLATELET # BLD AUTO: 238 K/UL
POTASSIUM SERPL-SCNC: 4.3 MMOL/L
PROT SERPL-MCNC: 7.3 G/DL
RBC # BLD: 4.24 M/UL
RBC # FLD: 15.1 %
SODIUM SERPL-SCNC: 137 MMOL/L
T4 FREE SERPL-MCNC: 1.8 NG/DL
TSH SERPL-ACNC: 1.57 UIU/ML
WBC # FLD AUTO: 6.95 K/UL

## 2023-11-12 PROBLEM — N20.0 KIDNEY STONE: Status: ACTIVE | Noted: 2023-11-08

## 2023-11-12 PROBLEM — I25.10 ATHEROSCLEROTIC HEART DISEASE: Status: ACTIVE | Noted: 2023-05-26

## 2023-11-13 ENCOUNTER — APPOINTMENT (OUTPATIENT)
Dept: CARDIOLOGY | Facility: CLINIC | Age: 83
End: 2023-11-13
Payer: MEDICARE

## 2023-11-13 VITALS
WEIGHT: 144.3 LBS | OXYGEN SATURATION: 97 % | HEIGHT: 66 IN | HEART RATE: 97 BPM | DIASTOLIC BLOOD PRESSURE: 80 MMHG | SYSTOLIC BLOOD PRESSURE: 130 MMHG | RESPIRATION RATE: 18 BRPM | TEMPERATURE: 97.3 F | BODY MASS INDEX: 23.19 KG/M2

## 2023-11-13 DIAGNOSIS — L65.9 NONSCARRING HAIR LOSS, UNSPECIFIED: ICD-10-CM

## 2023-11-13 DIAGNOSIS — R19.06 EPIGASTRIC SWELLING, MASS OR LUMP: ICD-10-CM

## 2023-11-13 DIAGNOSIS — R00.2 PALPITATIONS: ICD-10-CM

## 2023-11-13 PROCEDURE — 93000 ELECTROCARDIOGRAM COMPLETE: CPT

## 2023-11-13 PROCEDURE — 99214 OFFICE O/P EST MOD 30 MIN: CPT

## 2023-11-13 RX ORDER — PANTOPRAZOLE 40 MG/1
40 TABLET, DELAYED RELEASE ORAL
Qty: 30 | Refills: 1 | Status: ACTIVE | COMMUNITY
Start: 2023-11-13 | End: 1900-01-01

## 2023-11-16 ENCOUNTER — APPOINTMENT (OUTPATIENT)
Dept: CARDIOLOGY | Facility: CLINIC | Age: 83
End: 2023-11-16
Payer: MEDICARE

## 2023-11-16 PROCEDURE — 78452 HT MUSCLE IMAGE SPECT MULT: CPT

## 2023-11-16 PROCEDURE — A9500: CPT

## 2023-11-16 PROCEDURE — 93015 CV STRESS TEST SUPVJ I&R: CPT

## 2023-11-16 RX ORDER — REGADENOSON 0.08 MG/ML
0.4 INJECTION, SOLUTION INTRAVENOUS
Qty: 1 | Refills: 0 | Status: COMPLETED | OUTPATIENT
Start: 2023-11-16

## 2023-11-16 RX ADMIN — REGADENOSON 0 MG/5ML: 0.08 INJECTION, SOLUTION INTRAVENOUS at 00:00

## 2023-11-30 ENCOUNTER — RX RENEWAL (OUTPATIENT)
Age: 83
End: 2023-11-30

## 2023-12-06 ENCOUNTER — APPOINTMENT (OUTPATIENT)
Dept: GASTROENTEROLOGY | Facility: CLINIC | Age: 83
End: 2023-12-06
Payer: MEDICARE

## 2023-12-06 VITALS
HEART RATE: 61 BPM | HEIGHT: 66 IN | WEIGHT: 144 LBS | TEMPERATURE: 97.2 F | SYSTOLIC BLOOD PRESSURE: 166 MMHG | OXYGEN SATURATION: 99 % | BODY MASS INDEX: 23.14 KG/M2 | DIASTOLIC BLOOD PRESSURE: 85 MMHG

## 2023-12-06 PROCEDURE — 99214 OFFICE O/P EST MOD 30 MIN: CPT

## 2023-12-17 ENCOUNTER — RX RENEWAL (OUTPATIENT)
Age: 83
End: 2023-12-17

## 2023-12-27 ENCOUNTER — LABORATORY RESULT (OUTPATIENT)
Age: 83
End: 2023-12-27

## 2023-12-27 ENCOUNTER — APPOINTMENT (OUTPATIENT)
Dept: CARDIOLOGY | Facility: CLINIC | Age: 83
End: 2023-12-27
Payer: MEDICARE

## 2023-12-27 VITALS
SYSTOLIC BLOOD PRESSURE: 136 MMHG | BODY MASS INDEX: 22.4 KG/M2 | HEIGHT: 66 IN | WEIGHT: 139.38 LBS | RESPIRATION RATE: 17 BRPM | HEART RATE: 61 BPM | DIASTOLIC BLOOD PRESSURE: 68 MMHG | TEMPERATURE: 98.2 F | OXYGEN SATURATION: 99 %

## 2023-12-27 DIAGNOSIS — L65.9 NONSCARRING HAIR LOSS, UNSPECIFIED: ICD-10-CM

## 2023-12-27 PROCEDURE — 99214 OFFICE O/P EST MOD 30 MIN: CPT

## 2023-12-27 PROCEDURE — 93000 ELECTROCARDIOGRAM COMPLETE: CPT

## 2023-12-27 NOTE — HISTORY OF PRESENT ILLNESS
[FreeTextEntry1] : pt presents for f/u pt with paf pt with cad s/p ptci 4/23 .pt feels well 2 concernss mild weight loss and hair loss .pt with regular appetite pt denies any chest  pain dizziness ,lightheadedness ,nausea vomiting diaphoresis

## 2024-01-25 ENCOUNTER — RX RENEWAL (OUTPATIENT)
Age: 84
End: 2024-01-25

## 2024-02-12 ENCOUNTER — APPOINTMENT (OUTPATIENT)
Dept: CARDIOLOGY | Facility: CLINIC | Age: 84
End: 2024-02-12
Payer: MEDICARE

## 2024-02-12 VITALS
DIASTOLIC BLOOD PRESSURE: 56 MMHG | HEIGHT: 66 IN | OXYGEN SATURATION: 100 % | WEIGHT: 147 LBS | BODY MASS INDEX: 23.63 KG/M2 | SYSTOLIC BLOOD PRESSURE: 132 MMHG | HEART RATE: 60 BPM

## 2024-02-12 DIAGNOSIS — R63.4 ABNORMAL WEIGHT LOSS: ICD-10-CM

## 2024-02-12 PROCEDURE — 93000 ELECTROCARDIOGRAM COMPLETE: CPT

## 2024-02-12 PROCEDURE — 99214 OFFICE O/P EST MOD 30 MIN: CPT

## 2024-02-12 NOTE — HISTORY OF PRESENT ILLNESS
[FreeTextEntry1] : This is an 84 y/o female with a pmhx of TIA, Afib, PPM, HLD, HTN, DM, Emphysema CAD s/p PCI 4/3/2023, cath on 7/14 Successful PCI with NAVEEN to the mid LAD. Repeat cath 7/21/23- Coronary angiography demonstrated minor luminal irregularities patent stents to the lad and lcx s/p lexiscan 11/16/23. pt reports feeling well  .pt feels well bp stable . Pt denies any CP SOB dizziness HA blurry vision

## 2024-02-14 LAB
ANION GAP SERPL CALC-SCNC: 10 MMOL/L
ANION GAP SERPL CALC-SCNC: 20 MMOL/L
APPEARANCE: CLEAR
BACTERIA UR CULT: NORMAL
BACTERIA: NEGATIVE /HPF
BILIRUBIN URINE: NEGATIVE
BLOOD URINE: ABNORMAL
BUN SERPL-MCNC: 15 MG/DL
BUN SERPL-MCNC: 22 MG/DL
CALCIUM SERPL-MCNC: 10.3 MG/DL
CALCIUM SERPL-MCNC: 9.9 MG/DL
CAST: 0 /LPF
CHLORIDE SERPL-SCNC: 100 MMOL/L
CHLORIDE SERPL-SCNC: 105 MMOL/L
CO2 SERPL-SCNC: 18 MMOL/L
CO2 SERPL-SCNC: 27 MMOL/L
COLOR: YELLOW
CREAT SERPL-MCNC: 1.06 MG/DL
CREAT SERPL-MCNC: 1.13 MG/DL
EGFR: 49 ML/MIN/1.73M2
EGFR: 53 ML/MIN/1.73M2
EPITHELIAL CELLS: 2 /HPF
GLUCOSE QUALITATIVE U: >=1000 MG/DL
GLUCOSE SERPL-MCNC: 196 MG/DL
GLUCOSE SERPL-MCNC: 63 MG/DL
KETONES URINE: NEGATIVE MG/DL
LEUKOCYTE ESTERASE URINE: NEGATIVE
MICROSCOPIC-UA: NORMAL
NITRITE URINE: NEGATIVE
PH URINE: 6.5
POTASSIUM SERPL-SCNC: 4.4 MMOL/L
POTASSIUM SERPL-SCNC: 4.7 MMOL/L
PROTEIN URINE: NORMAL MG/DL
RED BLOOD CELLS URINE: 24 /HPF
SODIUM SERPL-SCNC: 136 MMOL/L
SODIUM SERPL-SCNC: 142 MMOL/L
SPECIFIC GRAVITY URINE: 1.02
UROBILINOGEN URINE: 0.2 MG/DL
WHITE BLOOD CELLS URINE: 1 /HPF

## 2024-02-15 ENCOUNTER — RX RENEWAL (OUTPATIENT)
Age: 84
End: 2024-02-15

## 2024-02-15 RX ORDER — AMLODIPINE BESYLATE 5 MG/1
5 TABLET ORAL
Qty: 90 | Refills: 2 | Status: ACTIVE | COMMUNITY
Start: 2023-11-09 | End: 1900-01-01

## 2024-03-06 ENCOUNTER — APPOINTMENT (OUTPATIENT)
Dept: GASTROENTEROLOGY | Facility: CLINIC | Age: 84
End: 2024-03-06

## 2024-03-31 ENCOUNTER — RX RENEWAL (OUTPATIENT)
Age: 84
End: 2024-03-31

## 2024-03-31 RX ORDER — ATORVASTATIN CALCIUM 40 MG/1
40 TABLET, FILM COATED ORAL
Qty: 90 | Refills: 1 | Status: ACTIVE | COMMUNITY
Start: 2023-11-30 | End: 1900-01-01

## 2024-04-08 ENCOUNTER — NON-APPOINTMENT (OUTPATIENT)
Age: 84
End: 2024-04-08

## 2024-04-08 ENCOUNTER — APPOINTMENT (OUTPATIENT)
Dept: INTERNAL MEDICINE | Facility: CLINIC | Age: 84
End: 2024-04-08
Payer: MEDICARE

## 2024-04-08 VITALS — OXYGEN SATURATION: 99 % | HEART RATE: 59 BPM

## 2024-04-08 VITALS
OXYGEN SATURATION: 95 % | BODY MASS INDEX: 23.63 KG/M2 | SYSTOLIC BLOOD PRESSURE: 142 MMHG | HEART RATE: 77 BPM | DIASTOLIC BLOOD PRESSURE: 70 MMHG | HEIGHT: 66 IN | WEIGHT: 147 LBS | TEMPERATURE: 97.6 F

## 2024-04-08 LAB
ALBUMIN SERPL ELPH-MCNC: 4.5 G/DL
ALP BLD-CCNC: 94 U/L
ALT SERPL-CCNC: 24 U/L
ANION GAP SERPL CALC-SCNC: 12 MMOL/L
AST SERPL-CCNC: 25 U/L
BASOPHILS # BLD AUTO: 0.06 K/UL
BASOPHILS NFR BLD AUTO: 0.9 %
BILIRUB SERPL-MCNC: 0.7 MG/DL
BUN SERPL-MCNC: 22 MG/DL
CALCIUM SERPL-MCNC: 9.7 MG/DL
CHLORIDE SERPL-SCNC: 99 MMOL/L
CK SERPL-CCNC: 75 U/L
CO2 SERPL-SCNC: 25 MMOL/L
CREAT SERPL-MCNC: 1.35 MG/DL
EGFR: 39 ML/MIN/1.73M2
EOSINOPHIL # BLD AUTO: 0.1 K/UL
EOSINOPHIL NFR BLD AUTO: 1.5 %
GLUCOSE SERPL-MCNC: 140 MG/DL
HCT VFR BLD CALC: 37.7 %
HGB BLD-MCNC: 12.2 G/DL
IMM GRANULOCYTES NFR BLD AUTO: 0.3 %
LYMPHOCYTES # BLD AUTO: 2.1 K/UL
LYMPHOCYTES NFR BLD AUTO: 30.7 %
MAN DIFF?: NORMAL
MCHC RBC-ENTMCNC: 28.1 PG
MCHC RBC-ENTMCNC: 32.4 GM/DL
MCV RBC AUTO: 86.9 FL
MONOCYTES # BLD AUTO: 0.66 K/UL
MONOCYTES NFR BLD AUTO: 9.6 %
NEUTROPHILS # BLD AUTO: 3.91 K/UL
NEUTROPHILS NFR BLD AUTO: 57 %
PLATELET # BLD AUTO: 215 K/UL
POTASSIUM SERPL-SCNC: 4.5 MMOL/L
PROT SERPL-MCNC: 6.6 G/DL
RBC # BLD: 4.34 M/UL
RBC # FLD: 13.4 %
SODIUM SERPL-SCNC: 136 MMOL/L
WBC # FLD AUTO: 6.85 K/UL

## 2024-04-08 PROCEDURE — 93000 ELECTROCARDIOGRAM COMPLETE: CPT

## 2024-04-08 PROCEDURE — 99214 OFFICE O/P EST MOD 30 MIN: CPT

## 2024-04-08 PROCEDURE — G2211 COMPLEX E/M VISIT ADD ON: CPT

## 2024-04-08 RX ORDER — RIVAROXABAN 15 MG/1
15 TABLET, FILM COATED ORAL
Qty: 90 | Refills: 1 | Status: ACTIVE | COMMUNITY
Start: 2024-04-08 | End: 1900-01-01

## 2024-04-08 NOTE — PHYSICAL EXAM
Patient:   SAM LOPEZ            MRN: [id number]            FIN: [id number]               Age:   74 years     Sex:  Female     :  45   Associated Diagnoses:   Right sided sciatica; Lumbar contusion   Author:   Lilliana HOLLAND, Rodolfo COONNOR      Basic Information   History source: Patient.      History of Present Illness   This is a 74-year-old white female presents to the emergency department with 10 days of low back pain.  Patient reports that she stumbled and fell into the cover of the toilet bowl tank resulting in injury to her right side  of her lower back.  She was evaluated at a nearby immediate care 3 days ago was given Norco and a muscle relaxer.  She reports that this has not helped her as her pain is continued.  She denies any saddle anesthesia denies any bladder or bowel incontinence however she is developed numbness over her right buttocks traveling down the posterior aspect of her right leg.  There are no known aggravating or alleviating factors.  Patient is able to ambulate but does have discomfort.      Review of Systems   Constitutional symptoms:  Negative except as documented in HPI.   Skin symptoms:  Negative except as documented in HPI.   Eye symptoms:  Negative except as documented in HPI.   ENMT symptoms:  Negative except as documented in HPI.   Respiratory symptoms:  Negative except as documented in HPI.   Cardiovascular symptoms:  Negative except as documented in HPI.   Gastrointestinal symptoms:  Negative except as documented in HPI.   Musculoskeletal symptoms:  Back pain.   Neurologic symptoms:  Negative except as documented in HPI.   Psychiatric symptoms:  Negative except as documented in HPI.             Additional review of systems information: All other systems reviewed and otherwise negative.      Health Status   Allergies:    Allergic Reactions (Selected)  Severity Not Documented  Morphine- No reactions were documented..      Past Medical/ Family/ Social History       Medical history   Negative.   Surgical history: Hip replacement, knee replacement.   Social history: Alcohol use: Denies, Tobacco use: Denies, Drug use: Denies.      Physical Examination               Vital Signs   Vital Signs   01/08/18 08:07           Temperature Oral          98.0 F                             Peripheral Pulse Rate     60 bpm                             Respiratory Rate          16 br/min                             Systolic Blood Pressure   91 mmHg                             Diastolic Blood Pressure  59 mmHg  LOW                             Mean Arterial Pressure    70 mmHg                             Oxygen Saturation         97 %  .              Oxygen saturation:  97 %.   General:  Alert, mild distress.    Skin:  Warm, pink, intact, moist.    Head:  Normocephalic, atraumatic.    Neck:  Supple, trachea midline, no tenderness.    Eye:  Pupils are equal, round and reactive to light, extraocular movements are intact, normal conjunctiva.    Cardiovascular:  Regular rate and rhythm, No murmur, Normal peripheral perfusion.    Respiratory:  Lungs are clear to auscultation, respirations are non-labored, breath sounds are equal.    Gastrointestinal:  Soft, Nontender, Non distended, Normal bowel sounds.    Genitourinary   Back:  Lumbar: Right, lateral, moderate, tenderness, ecchymosis, no vertebral point tenderness, Sacral: Right, moderate, tenderness, no vertebral point tenderness.    Musculoskeletal:  Normal ROM, normal strength, no tenderness, no swelling, no deformity, Normal bilateral straight leg raise.    Neurological:  Alert and oriented to person, place, time, and situation, No focal neurological deficit observed, CN II-XII intact, normal sensory observed, normal motor observed, normal speech observed, normal coordination observed.    Psychiatric:  Cooperative, appropriate mood & affect, normal judgment.       Medical Decision Making   Differential Diagnosis:  Back pain, lumbar strain,  sciatica, compression fracture.    * Final Report *    Reason For Exam  fall/pain    Report  Procedure: XR Pelvis    COMPARISON: 06/26/2016.    INDICATIONS: fall/pain    TECHNIQUES: Single frontal view of pelvis were obtained.    RESULTS/IMPRESSION: The frontal view of pelvis is symmetric without displacement of fracture or dislocation.  There is early DJD at the pubis symphysis and mild spondylosis at the lumbar sacral spine.  Bilateral hip arthroplasty is unremarkable.      * Final Report *    Reason For Exam  fall/pain    Report  XR Spine Lumbar Trauma 3 View    TECHNIQUE: XR Spine Lumbar Trauma 3 View    INDICATION:  fall/pain back pain    COMPARISON: None.    FINDINGS:    IMPRESSION: Small and moderate-sized osteophytes at all levels.  Minimal anterolisthesis at L4-L5.  No acute fracture detected.  Facet degenerative change at L4-L5 and L5-S1.       Medical Decision Making  Multiple diagnoses were considered in the evaluation of this patient.  Vital signs were reviewed.    Patient with noted ecchymosis overlying the right flank resulting from likely blunt trauma sustained 10 days ago.  Patient reports significant change in sensation of the right buttocks with radiation of the numbness down the posterior aspect of her right leg.  I believe this is consistent with sciatica.  Patient is had a history of spinal stenosis.  Patient adequately treated with Norco and muscle relaxer.  Medrol Dosepak will be added.      Reexamination/ Reevaluation   Reexamination at 9:30 AM: Patient remains stable resting comfortably.  Diagnostic tests were explained in detail to the patient as well as her diagnosis.  Patient was encouraged to follow-up with her primary care physician in 5-7 days should her symptoms not improve at that time.      Impression and Plan   Diagnosis   Right sided sciatica (VCZ36-OT M54.31, Discharge, Medical)   Lumbar contusion (PDD78-OF S30.0XXA, Discharge, Medical)   Plan   Condition: Stable.     Disposition: Discharged: to home.    Prescriptions: [facility] prescriptions   Pharmacy:  Medrol Dosepak 4 mg oral tablet (Prescribe): 1 packet(s), PO, Once, as directed on package labeling, 21 tab(s), 0 Refill(s).    Patient was given the following educational materials: Radicular Pain, Sciatica, Radicular Pain, Sciatica.    Follow up with: Follow up with primary care provider Call for follow up appointment  Follow-Up As Directed  Return if symptoms worsen, Follow up with primary care provider Call for follow up appointment  Follow-Up As Directed  Return if symptoms worsen; Jairo Dupont.    Counseled: Patient, Regarding diagnosis, Regarding diagnostic results, Regarding treatment plan, Regarding prescription, Patient indicated understanding of instructions.         [No Acute Distress] : no acute distress [Well Nourished] : well nourished [Well Developed] : well developed [Well-Appearing] : well-appearing [Normal Sclera/Conjunctiva] : normal sclera/conjunctiva [Normal Outer Ear/Nose] : the outer ears and nose were normal in appearance [Normal Oropharynx] : the oropharynx was normal [No JVD] : no jugular venous distention [No Lymphadenopathy] : no lymphadenopathy [Supple] : supple [No Respiratory Distress] : no respiratory distress  [No Accessory Muscle Use] : no accessory muscle use [Clear to Auscultation] : lungs were clear to auscultation bilaterally [Normal Rate] : normal rate  [Regular Rhythm] : with a regular rhythm [Normal S1, S2] : normal S1 and S2 [No Murmur] : no murmur heard [No Carotid Bruits] : no carotid bruits [No Varicosities] : no varicosities [Pedal Pulses Present] : the pedal pulses are present [No Edema] : there was no peripheral edema [No Extremity Clubbing/Cyanosis] : no extremity clubbing/cyanosis [Soft] : abdomen soft [Non Tender] : non-tender [Non-distended] : non-distended [Normal Bowel Sounds] : normal bowel sounds [Normal Anterior Cervical Nodes] : no anterior cervical lymphadenopathy [No CVA Tenderness] : no CVA  tenderness [No Spinal Tenderness] : no spinal tenderness [No Joint Swelling] : no joint swelling [Grossly Normal Strength/Tone] : grossly normal strength/tone [No Rash] : no rash [Coordination Grossly Intact] : coordination grossly intact [No Focal Deficits] : no focal deficits [Normal Gait] : normal gait [Alert and Oriented x3] : oriented to person, place, and time

## 2024-04-08 NOTE — HEALTH RISK ASSESSMENT
[0] : 2) Feeling down, depressed, or hopeless: Not at all (0) [PHQ-2 Negative - No further assessment needed] : PHQ-2 Negative - No further assessment needed [Never] : Never [[id number]] : 0

## 2024-04-08 NOTE — HISTORY OF PRESENT ILLNESS
[FreeTextEntry1] : discuss eliquis  [de-identified] : This is an 82 y/o female with a pmhx of TIA, Afib on eliquis, PPM, HLD, HTN, DM, Emphysema CAD s/p PCI 4/3/2023, cath on 7/14 Successful PCI with NAVEEN to the mid LAD presents here today to discuss anticoag options. Currently on eliquis but says its too expensive as her copay/deductible $600 per month and wants to switch to another agent. She feels well and has no acute complaints today. Pt will fax us bloodwork from Dr. Al's office on 3/2024.  Denies chest pain, SOB, QUEZADA, dizziness, diaphoresis, palpitations, LE swelling, orthopnea, syncope, n/v, headache.

## 2024-04-27 ENCOUNTER — RX RENEWAL (OUTPATIENT)
Age: 84
End: 2024-04-27

## 2024-04-27 RX ORDER — VALSARTAN 320 MG/1
320 TABLET, COATED ORAL DAILY
Qty: 90 | Refills: 1 | Status: ACTIVE | COMMUNITY
Start: 2024-01-25 | End: 1900-01-01

## 2024-05-12 LAB
25(OH)D3 SERPL-MCNC: 32 NG/ML
ALBUMIN SERPL ELPH-MCNC: 4 G/DL
ALBUMIN SERPL ELPH-MCNC: 4.4 G/DL
ALBUMIN SERPL ELPH-MCNC: 4.6 G/DL
ALBUMIN SERPL ELPH-MCNC: 4.7 G/DL
ALBUMIN SERPL ELPH-MCNC: 4.9 G/DL
ALP BLD-CCNC: 70 U/L
ALP BLD-CCNC: 84 U/L
ALP BLD-CCNC: 87 U/L
ALP BLD-CCNC: 89 U/L
ALP BLD-CCNC: 94 U/L
ALT SERPL-CCNC: 14 U/L
ALT SERPL-CCNC: 17 U/L
ALT SERPL-CCNC: 19 U/L
ALT SERPL-CCNC: 21 U/L
ALT SERPL-CCNC: 31 U/L
ANION GAP SERPL CALC-SCNC: 14 MMOL/L
ANION GAP SERPL CALC-SCNC: 15 MMOL/L
APPEARANCE: ABNORMAL
APPEARANCE: CLEAR
AST SERPL-CCNC: 17 U/L
AST SERPL-CCNC: 21 U/L
AST SERPL-CCNC: 22 U/L
AST SERPL-CCNC: 22 U/L
AST SERPL-CCNC: 24 U/L
BACTERIA UR CULT: ABNORMAL
BACTERIA UR CULT: NORMAL
BACTERIA UR CULT: NORMAL
BACTERIA: NEGATIVE /HPF
BASOPHILS # BLD AUTO: 0.06 K/UL
BASOPHILS # BLD AUTO: 0.07 K/UL
BASOPHILS NFR BLD AUTO: 1.1 %
BASOPHILS NFR BLD AUTO: 1.1 %
BILIRUB DIRECT SERPL-MCNC: 0.2 MG/DL
BILIRUB INDIRECT SERPL-MCNC: 0.4 MG/DL
BILIRUB SERPL-MCNC: 0.5 MG/DL
BILIRUB SERPL-MCNC: 0.6 MG/DL
BILIRUB SERPL-MCNC: 0.6 MG/DL
BILIRUBIN URINE: NEGATIVE
BLOOD URINE: ABNORMAL
BLOOD URINE: ABNORMAL
BLOOD URINE: NEGATIVE
BLOOD URINE: NEGATIVE
BUN SERPL-MCNC: 20 MG/DL
BUN SERPL-MCNC: 20 MG/DL
BUN SERPL-MCNC: 21 MG/DL
BUN SERPL-MCNC: 22 MG/DL
BUN SERPL-MCNC: 26 MG/DL
CALCIUM SERPL-MCNC: 10.2 MG/DL
CALCIUM SERPL-MCNC: 10.3 MG/DL
CALCIUM SERPL-MCNC: 9.5 MG/DL
CALCIUM SERPL-MCNC: 9.5 MG/DL
CALCIUM SERPL-MCNC: 9.9 MG/DL
CAST: 0 /LPF
CAST: 0 /LPF
CAST: 11 /LPF
CAST: 2 /LPF
CHLORIDE SERPL-SCNC: 101 MMOL/L
CHLORIDE SERPL-SCNC: 102 MMOL/L
CHLORIDE SERPL-SCNC: 103 MMOL/L
CHOLEST SERPL-MCNC: 174 MG/DL
CHOLEST SERPL-MCNC: 199 MG/DL
CK SERPL-CCNC: 143 U/L
CK SERPL-CCNC: 74 U/L
CO2 SERPL-SCNC: 19 MMOL/L
CO2 SERPL-SCNC: 20 MMOL/L
CO2 SERPL-SCNC: 22 MMOL/L
CO2 SERPL-SCNC: 23 MMOL/L
CO2 SERPL-SCNC: 23 MMOL/L
COLOR: YELLOW
CREAT SERPL-MCNC: 1.14 MG/DL
CREAT SERPL-MCNC: 1.22 MG/DL
CREAT SERPL-MCNC: 1.26 MG/DL
CREAT SERPL-MCNC: 1.29 MG/DL
CREAT SERPL-MCNC: 1.35 MG/DL
CREAT SPEC-SCNC: 200 MG/DL
EGFR: 39 ML/MIN/1.73M2
EGFR: 42 ML/MIN/1.73M2
EGFR: 43 ML/MIN/1.73M2
EGFR: 45 ML/MIN/1.73M2
EGFR: 48 ML/MIN/1.73M2
EOSINOPHIL # BLD AUTO: 0.07 K/UL
EOSINOPHIL # BLD AUTO: 0.12 K/UL
EOSINOPHIL NFR BLD AUTO: 1.3 %
EOSINOPHIL NFR BLD AUTO: 1.8 %
EPITHELIAL CELLS: 2 /HPF
EPITHELIAL CELLS: 3 /HPF
ESTIMATED AVERAGE GLUCOSE: 169 MG/DL
ESTIMATED AVERAGE GLUCOSE: 177 MG/DL
FERRITIN SERPL-MCNC: 38 NG/ML
FERRITIN SERPL-MCNC: 45 NG/ML
FOLATE SERPL-MCNC: 11.5 NG/ML
GGT SERPL-CCNC: 26 U/L
GGT SERPL-CCNC: 35 U/L
GLUCOSE QUALITATIVE U: 250 MG/DL
GLUCOSE QUALITATIVE U: 250 MG/DL
GLUCOSE QUALITATIVE U: NEGATIVE MG/DL
GLUCOSE QUALITATIVE U: NEGATIVE MG/DL
GLUCOSE SERPL-MCNC: 105 MG/DL
GLUCOSE SERPL-MCNC: 141 MG/DL
GLUCOSE SERPL-MCNC: 182 MG/DL
GLUCOSE SERPL-MCNC: 183 MG/DL
GLUCOSE SERPL-MCNC: 98 MG/DL
HAPTOGLOB SERPL-MCNC: 97 MG/DL
HBA1C MFR BLD HPLC: 7.5 %
HBA1C MFR BLD HPLC: 7.8 %
HCT VFR BLD CALC: 35.8 %
HCT VFR BLD CALC: 37.6 %
HCT VFR BLD CALC: 39 %
HDLC SERPL-MCNC: 87 MG/DL
HDLC SERPL-MCNC: 91 MG/DL
HGB BLD-MCNC: 11.3 G/DL
HGB BLD-MCNC: 12.4 G/DL
HGB BLD-MCNC: 12.7 G/DL
HYALINE CASTS: PRESENT
IMM GRANULOCYTES NFR BLD AUTO: 0.2 %
IMM GRANULOCYTES NFR BLD AUTO: 0.5 %
IRON SATN MFR SERPL: 13 %
IRON SERPL-MCNC: 107 UG/DL
IRON SERPL-MCNC: 49 UG/DL
KETONES URINE: ABNORMAL MG/DL
KETONES URINE: ABNORMAL MG/DL
KETONES URINE: NEGATIVE MG/DL
KETONES URINE: NEGATIVE MG/DL
LDH SERPL-CCNC: 205 U/L
LDLC SERPL CALC-MCNC: 70 MG/DL
LDLC SERPL CALC-MCNC: 90 MG/DL
LDLC SERPL DIRECT ASSAY-MCNC: 68 MG/DL
LDLC SERPL DIRECT ASSAY-MCNC: 93 MG/DL
LEUKOCYTE ESTERASE URINE: ABNORMAL
LEUKOCYTE ESTERASE URINE: NEGATIVE
LYMPHOCYTES # BLD AUTO: 1.84 K/UL
LYMPHOCYTES # BLD AUTO: 1.91 K/UL
LYMPHOCYTES NFR BLD AUTO: 27.9 %
LYMPHOCYTES NFR BLD AUTO: 36.3 %
MAGNESIUM SERPL-MCNC: 1.8 MG/DL
MAGNESIUM SERPL-MCNC: 1.8 MG/DL
MAGNESIUM SERPL-MCNC: 1.9 MG/DL
MAN DIFF?: NORMAL
MAN DIFF?: NORMAL
MCHC RBC-ENTMCNC: 26.9 PG
MCHC RBC-ENTMCNC: 27 PG
MCHC RBC-ENTMCNC: 29 PG
MCHC RBC-ENTMCNC: 31.6 GM/DL
MCHC RBC-ENTMCNC: 32.6 GM/DL
MCHC RBC-ENTMCNC: 33 GM/DL
MCV RBC AUTO: 82.6 FL
MCV RBC AUTO: 85.6 FL
MCV RBC AUTO: 87.9 FL
MICROALBUMIN 24H UR DL<=1MG/L-MCNC: 2 MG/DL
MICROALBUMIN/CREAT 24H UR-RTO: 10 MG/G
MICROSCOPIC-UA: NORMAL
MONOCYTES # BLD AUTO: 0.66 K/UL
MONOCYTES # BLD AUTO: 0.73 K/UL
MONOCYTES NFR BLD AUTO: 11.1 %
MONOCYTES NFR BLD AUTO: 12.5 %
NEUTROPHILS # BLD AUTO: 2.55 K/UL
NEUTROPHILS # BLD AUTO: 3.81 K/UL
NEUTROPHILS NFR BLD AUTO: 48.6 %
NEUTROPHILS NFR BLD AUTO: 57.6 %
NITRITE URINE: NEGATIVE
NONHDLC SERPL-MCNC: 108 MG/DL
NONHDLC SERPL-MCNC: 87 MG/DL
NT-PROBNP SERPL-MCNC: 309 PG/ML
PH URINE: 5.5
PH URINE: 6
PLATELET # BLD AUTO: 217 K/UL
PLATELET # BLD AUTO: 239 K/UL
PLATELET # BLD AUTO: 269 K/UL
POTASSIUM SERPL-SCNC: 4.1 MMOL/L
POTASSIUM SERPL-SCNC: 4.2 MMOL/L
POTASSIUM SERPL-SCNC: 4.3 MMOL/L
POTASSIUM SERPL-SCNC: 4.5 MMOL/L
POTASSIUM SERPL-SCNC: 4.5 MMOL/L
PROT SERPL-MCNC: 6.3 G/DL
PROT SERPL-MCNC: 7 G/DL
PROT SERPL-MCNC: 7.2 G/DL
PROTEIN URINE: 30 MG/DL
PROTEIN URINE: NEGATIVE MG/DL
PROTEIN URINE: NORMAL MG/DL
PROTEIN URINE: NORMAL MG/DL
RBC # BLD: 4.18 M/UL
RBC # BLD: 4.28 M/UL
RBC # BLD: 4.72 M/UL
RBC # FLD: 12.6 %
RBC # FLD: 14.5 %
RBC # FLD: 15.6 %
RED BLOOD CELLS URINE: 0 /HPF
RED BLOOD CELLS URINE: 4 /HPF
RED BLOOD CELLS URINE: 44 /HPF
RED BLOOD CELLS URINE: 6 /HPF
REVIEW: NORMAL
SODIUM SERPL-SCNC: 135 MMOL/L
SODIUM SERPL-SCNC: 135 MMOL/L
SODIUM SERPL-SCNC: 139 MMOL/L
SODIUM SERPL-SCNC: 140 MMOL/L
SODIUM SERPL-SCNC: 140 MMOL/L
SPECIFIC GRAVITY URINE: 1.01
SPECIFIC GRAVITY URINE: 1.02
T3FREE SERPL-MCNC: 1.81 PG/ML
T4 FREE SERPL-MCNC: 1.4 NG/DL
T4 FREE SERPL-MCNC: 1.8 NG/DL
T4 FREE SERPL-MCNC: 1.9 NG/DL
T4 FREE SERPL-MCNC: 2.1 NG/DL
TIBC SERPL-MCNC: 367 UG/DL
TRANSFERRIN SERPL-MCNC: 301 MG/DL
TRANSFERRIN SERPL-MCNC: 320 MG/DL
TRIGL SERPL-MCNC: 104 MG/DL
TRIGL SERPL-MCNC: 97 MG/DL
TSH SERPL-ACNC: 1.2 UIU/ML
TSH SERPL-ACNC: 1.22 UIU/ML
TSH SERPL-ACNC: 1.51 UIU/ML
TSH SERPL-ACNC: 2.28 UIU/ML
UIBC SERPL-MCNC: 319 UG/DL
UROBILINOGEN URINE: 0.2 MG/DL
UROBILINOGEN URINE: 1 MG/DL
VIT B12 SERPL-MCNC: 316 PG/ML
WBC # FLD AUTO: 5.26 K/UL
WBC # FLD AUTO: 6.6 K/UL
WBC # FLD AUTO: 7.21 K/UL
WHITE BLOOD CELLS URINE: 0 /HPF
WHITE BLOOD CELLS URINE: 0 /HPF
WHITE BLOOD CELLS URINE: 1 /HPF
WHITE BLOOD CELLS URINE: 1 /HPF

## 2024-06-17 RX ORDER — METOPROLOL SUCCINATE 100 MG/1
100 TABLET, EXTENDED RELEASE ORAL DAILY
Qty: 90 | Refills: 1 | Status: ACTIVE | COMMUNITY
Start: 2023-05-16 | End: 1900-01-01

## 2024-06-17 RX ORDER — METOPROLOL SUCCINATE 50 MG/1
50 TABLET, EXTENDED RELEASE ORAL
Qty: 90 | Refills: 0 | Status: ACTIVE | COMMUNITY
Start: 2023-05-25 | End: 1900-01-01

## 2024-06-24 ENCOUNTER — APPOINTMENT (OUTPATIENT)
Dept: CARDIOLOGY | Facility: CLINIC | Age: 84
End: 2024-06-24
Payer: MEDICARE

## 2024-06-24 VITALS
OXYGEN SATURATION: 95 % | SYSTOLIC BLOOD PRESSURE: 130 MMHG | TEMPERATURE: 97.5 F | BODY MASS INDEX: 23.46 KG/M2 | DIASTOLIC BLOOD PRESSURE: 82 MMHG | HEART RATE: 64 BPM | HEIGHT: 66 IN | WEIGHT: 146 LBS

## 2024-06-24 DIAGNOSIS — Z95.0 PRESENCE OF CARDIAC PACEMAKER: ICD-10-CM

## 2024-06-24 DIAGNOSIS — E78.5 HYPERLIPIDEMIA, UNSPECIFIED: ICD-10-CM

## 2024-06-24 DIAGNOSIS — I10 ESSENTIAL (PRIMARY) HYPERTENSION: ICD-10-CM

## 2024-06-24 DIAGNOSIS — D64.9 ANEMIA, UNSPECIFIED: ICD-10-CM

## 2024-06-24 DIAGNOSIS — J43.9 EMPHYSEMA, UNSPECIFIED: ICD-10-CM

## 2024-06-24 DIAGNOSIS — E11.9 TYPE 2 DIABETES MELLITUS W/OUT COMPLICATIONS: ICD-10-CM

## 2024-06-24 DIAGNOSIS — Z13.228 ENCOUNTER FOR SCREENING FOR OTHER METABOLIC DISORDERS: ICD-10-CM

## 2024-06-24 DIAGNOSIS — R07.9 CHEST PAIN, UNSPECIFIED: ICD-10-CM

## 2024-06-24 DIAGNOSIS — I48.91 UNSPECIFIED ATRIAL FIBRILLATION: ICD-10-CM

## 2024-06-24 DIAGNOSIS — R82.90 UNSPECIFIED ABNORMAL FINDINGS IN URINE: ICD-10-CM

## 2024-06-24 DIAGNOSIS — I25.10 ATHEROSCLEROTIC HEART DISEASE OF NATIVE CORONARY ARTERY W/OUT ANGINA PECTORIS: ICD-10-CM

## 2024-06-24 DIAGNOSIS — N26.1 ATROPHY OF KIDNEY (TERMINAL): ICD-10-CM

## 2024-06-24 PROCEDURE — G2211 COMPLEX E/M VISIT ADD ON: CPT

## 2024-06-24 PROCEDURE — 99214 OFFICE O/P EST MOD 30 MIN: CPT

## 2024-06-24 PROCEDURE — 93000 ELECTROCARDIOGRAM COMPLETE: CPT

## 2024-06-24 RX ORDER — APIXABAN 5 MG/1
5 TABLET, FILM COATED ORAL
Qty: 180 | Refills: 0 | Status: DISCONTINUED | COMMUNITY
Start: 2023-12-17 | End: 2024-06-24

## 2024-06-24 RX ORDER — AMIODARONE HYDROCHLORIDE 200 MG/1
200 TABLET ORAL
Qty: 45 | Refills: 1 | Status: ACTIVE | COMMUNITY
Start: 2023-05-31 | End: 1900-01-01

## 2024-06-24 RX ORDER — CLOPIDOGREL BISULFATE 75 MG/1
75 TABLET, FILM COATED ORAL DAILY
Qty: 90 | Refills: 1 | Status: ACTIVE | COMMUNITY
Start: 2023-04-19 | End: 2024-07-14

## 2024-06-26 LAB
25(OH)D3 SERPL-MCNC: 31.3 NG/ML
ALBUMIN SERPL ELPH-MCNC: 4.8 G/DL
ALP BLD-CCNC: 94 U/L
ALT SERPL-CCNC: 19 U/L
ANION GAP SERPL CALC-SCNC: 17 MMOL/L
APPEARANCE: CLEAR
AST SERPL-CCNC: 24 U/L
BACTERIA UR CULT: NORMAL
BACTERIA: NEGATIVE /HPF
BASOPHILS # BLD AUTO: 0.05 K/UL
BASOPHILS NFR BLD AUTO: 0.8 %
BILIRUB DIRECT SERPL-MCNC: 0.2 MG/DL
BILIRUB INDIRECT SERPL-MCNC: 0.5 MG/DL
BILIRUB SERPL-MCNC: 0.7 MG/DL
BILIRUBIN URINE: NEGATIVE
BLOOD URINE: ABNORMAL
BUN SERPL-MCNC: 25 MG/DL
CALCIUM SERPL-MCNC: 10 MG/DL
CAST: 0 /LPF
CHLORIDE SERPL-SCNC: 99 MMOL/L
CHOLEST SERPL-MCNC: 183 MG/DL
CK SERPL-CCNC: 95 U/L
CO2 SERPL-SCNC: 19 MMOL/L
COLOR: YELLOW
CREAT SERPL-MCNC: 1.22 MG/DL
EGFR: 44 ML/MIN/1.73M2
EOSINOPHIL # BLD AUTO: 0.08 K/UL
EOSINOPHIL NFR BLD AUTO: 1.3 %
EPITHELIAL CELLS: 1 /HPF
ESTIMATED AVERAGE GLUCOSE: 212 MG/DL
FERRITIN SERPL-MCNC: 32 NG/ML
FOLATE SERPL-MCNC: 13.2 NG/ML
GLUCOSE QUALITATIVE U: >=1000 MG/DL
GLUCOSE SERPL-MCNC: 208 MG/DL
HBA1C MFR BLD HPLC: 9 %
HCT VFR BLD CALC: 38.1 %
HDLC SERPL-MCNC: 84 MG/DL
HGB BLD-MCNC: 12.3 G/DL
IMM GRANULOCYTES NFR BLD AUTO: 0.3 %
IRON SATN MFR SERPL: 18 %
IRON SERPL-MCNC: 75 UG/DL
KETONES URINE: NEGATIVE MG/DL
LDLC SERPL CALC-MCNC: 80 MG/DL
LDLC SERPL DIRECT ASSAY-MCNC: 81 MG/DL
LEUKOCYTE ESTERASE URINE: NEGATIVE
LYMPHOCYTES # BLD AUTO: 1.76 K/UL
LYMPHOCYTES NFR BLD AUTO: 27.7 %
MAN DIFF?: NORMAL
MCHC RBC-ENTMCNC: 27.7 PG
MCHC RBC-ENTMCNC: 32.3 GM/DL
MCV RBC AUTO: 85.8 FL
MICROSCOPIC-UA: NORMAL
MONOCYTES # BLD AUTO: 0.65 K/UL
MONOCYTES NFR BLD AUTO: 10.2 %
NEUTROPHILS # BLD AUTO: 3.79 K/UL
NEUTROPHILS NFR BLD AUTO: 59.7 %
NITRITE URINE: NEGATIVE
NONHDLC SERPL-MCNC: 100 MG/DL
PH URINE: 6
PLATELET # BLD AUTO: 226 K/UL
POTASSIUM SERPL-SCNC: 4.4 MMOL/L
PROT SERPL-MCNC: 7.2 G/DL
PROTEIN URINE: NEGATIVE MG/DL
RBC # BLD: 4.44 M/UL
RBC # FLD: 13.6 %
RED BLOOD CELLS URINE: 13 /HPF
SODIUM SERPL-SCNC: 136 MMOL/L
SPECIFIC GRAVITY URINE: 1.01
T4 FREE SERPL-MCNC: 1.8 NG/DL
TIBC SERPL-MCNC: 423 UG/DL
TRANSFERRIN SERPL-MCNC: 335 MG/DL
TRIGL SERPL-MCNC: 111 MG/DL
TSH SERPL-ACNC: 0.97 UIU/ML
UIBC SERPL-MCNC: 348 UG/DL
UROBILINOGEN URINE: 0.2 MG/DL
VIT B12 SERPL-MCNC: 261 PG/ML
WBC # FLD AUTO: 6.35 K/UL
WHITE BLOOD CELLS URINE: 0 /HPF

## 2024-07-26 ENCOUNTER — NON-APPOINTMENT (OUTPATIENT)
Age: 84
End: 2024-07-26

## 2024-09-02 ENCOUNTER — TRANSCRIPTION ENCOUNTER (OUTPATIENT)
Age: 84
End: 2024-09-02

## 2024-09-09 ENCOUNTER — APPOINTMENT (OUTPATIENT)
Dept: ELECTROPHYSIOLOGY | Facility: CLINIC | Age: 84
End: 2024-09-09

## 2024-09-13 ENCOUNTER — APPOINTMENT (OUTPATIENT)
Dept: INTERNAL MEDICINE | Facility: CLINIC | Age: 84
End: 2024-09-13
Payer: MEDICARE

## 2024-09-13 VITALS
HEART RATE: 60 BPM | TEMPERATURE: 98 F | BODY MASS INDEX: 24.27 KG/M2 | WEIGHT: 151 LBS | OXYGEN SATURATION: 98 % | DIASTOLIC BLOOD PRESSURE: 70 MMHG | SYSTOLIC BLOOD PRESSURE: 130 MMHG | HEIGHT: 66 IN

## 2024-09-13 DIAGNOSIS — E11.9 TYPE 2 DIABETES MELLITUS W/OUT COMPLICATIONS: ICD-10-CM

## 2024-09-13 PROCEDURE — 99214 OFFICE O/P EST MOD 30 MIN: CPT

## 2024-09-13 PROCEDURE — G2211 COMPLEX E/M VISIT ADD ON: CPT

## 2024-09-13 RX ORDER — CIPROFLOXACIN HYDROCHLORIDE 250 MG/1
250 TABLET, FILM COATED ORAL
Qty: 14 | Refills: 0 | Status: ACTIVE | COMMUNITY
Start: 2024-09-13 | End: 1900-01-01

## 2024-09-16 ENCOUNTER — NON-APPOINTMENT (OUTPATIENT)
Age: 84
End: 2024-09-16

## 2024-09-16 ENCOUNTER — APPOINTMENT (OUTPATIENT)
Dept: ELECTROPHYSIOLOGY | Facility: CLINIC | Age: 84
End: 2024-09-16
Payer: MEDICARE

## 2024-09-16 VITALS
WEIGHT: 153 LBS | OXYGEN SATURATION: 98 % | HEIGHT: 66 IN | DIASTOLIC BLOOD PRESSURE: 69 MMHG | HEART RATE: 68 BPM | SYSTOLIC BLOOD PRESSURE: 143 MMHG | BODY MASS INDEX: 24.59 KG/M2

## 2024-09-16 DIAGNOSIS — Z95.0 PRESENCE OF CARDIAC PACEMAKER: ICD-10-CM

## 2024-09-16 DIAGNOSIS — I25.10 ATHEROSCLEROTIC HEART DISEASE OF NATIVE CORONARY ARTERY W/OUT ANGINA PECTORIS: ICD-10-CM

## 2024-09-16 DIAGNOSIS — I10 ESSENTIAL (PRIMARY) HYPERTENSION: ICD-10-CM

## 2024-09-16 DIAGNOSIS — I49.5 SICK SINUS SYNDROME: ICD-10-CM

## 2024-09-16 DIAGNOSIS — Z79.899 OTHER LONG TERM (CURRENT) DRUG THERAPY: ICD-10-CM

## 2024-09-16 DIAGNOSIS — E78.5 HYPERLIPIDEMIA, UNSPECIFIED: ICD-10-CM

## 2024-09-16 DIAGNOSIS — I48.91 UNSPECIFIED ATRIAL FIBRILLATION: ICD-10-CM

## 2024-09-16 LAB
ALBUMIN SERPL ELPH-MCNC: 4.2 G/DL
ALP BLD-CCNC: 81 U/L
ALT SERPL-CCNC: 15 U/L
ANION GAP SERPL CALC-SCNC: 18 MMOL/L
APPEARANCE: CLEAR
AST SERPL-CCNC: 22 U/L
BACTERIA UR CULT: NORMAL
BACTERIA: NEGATIVE /HPF
BILIRUB DIRECT SERPL-MCNC: 0.1 MG/DL
BILIRUB INDIRECT SERPL-MCNC: 0.3 MG/DL
BILIRUB SERPL-MCNC: 0.5 MG/DL
BILIRUBIN URINE: NEGATIVE
BLOOD URINE: ABNORMAL
BUN SERPL-MCNC: 22 MG/DL
CALCIUM SERPL-MCNC: 9.8 MG/DL
CAST: 0 /LPF
CHLORIDE SERPL-SCNC: 101 MMOL/L
CHOLEST SERPL-MCNC: 189 MG/DL
CO2 SERPL-SCNC: 18 MMOL/L
COLOR: YELLOW
CREAT SERPL-MCNC: 1.2 MG/DL
EGFR: 45 ML/MIN/1.73M2
EPITHELIAL CELLS: 2 /HPF
ESTIMATED AVERAGE GLUCOSE: 169 MG/DL
GLUCOSE QUALITATIVE U: NEGATIVE MG/DL
GLUCOSE SERPL-MCNC: 162 MG/DL
HBA1C MFR BLD HPLC: 7.5 %
HCT VFR BLD CALC: 35.3 %
HDLC SERPL-MCNC: 78 MG/DL
HGB BLD-MCNC: 11 G/DL
KETONES URINE: NEGATIVE MG/DL
LDLC SERPL CALC-MCNC: 91 MG/DL
LEUKOCYTE ESTERASE URINE: ABNORMAL
MCHC RBC-ENTMCNC: 26.9 PG
MCHC RBC-ENTMCNC: 31.2 GM/DL
MCV RBC AUTO: 86.3 FL
MICROSCOPIC-UA: NORMAL
NITRITE URINE: NEGATIVE
NONHDLC SERPL-MCNC: 111 MG/DL
PH URINE: 7
PLATELET # BLD AUTO: 218 K/UL
POTASSIUM SERPL-SCNC: 4.7 MMOL/L
PROT SERPL-MCNC: 6.9 G/DL
PROTEIN URINE: NORMAL MG/DL
RBC # BLD: 4.09 M/UL
RBC # FLD: 14.5 %
RED BLOOD CELLS URINE: 37 /HPF
SODIUM SERPL-SCNC: 137 MMOL/L
SPECIFIC GRAVITY URINE: 1.02
TRIGL SERPL-MCNC: 118 MG/DL
TSH SERPL-ACNC: 1.5 UIU/ML
UROBILINOGEN URINE: 1 MG/DL
WBC # FLD AUTO: 6.04 K/UL
WHITE BLOOD CELLS URINE: 0 /HPF

## 2024-09-16 PROCEDURE — 99214 OFFICE O/P EST MOD 30 MIN: CPT

## 2024-09-16 PROCEDURE — 93280 PM DEVICE PROGR EVAL DUAL: CPT

## 2024-09-16 PROCEDURE — 93000 ELECTROCARDIOGRAM COMPLETE: CPT | Mod: 59

## 2024-09-16 NOTE — HISTORY OF PRESENT ILLNESS
[FreeTextEntry8] : 83 year old female presents with complaints of urinary urgency, dysuria and burning for 2 weeks.  She went to urgent care about 2 weeks ago, was diagnosed with UTI and started on nitrofurantoin.  She reports her symptoms have not improved on nitrofurantoin and stopped after 2-3 days of taking medication.  She denies any chest pain, palpitations, flank pain, fevers or shortness of breath.

## 2024-09-16 NOTE — REVIEW OF SYSTEMS
[Fatigue] : fatigue [Dysuria] : dysuria [Frequency] : frequency [Fever] : no fever [Night Sweats] : no night sweats [Discharge] : no discharge [Vision Problems] : no vision problems [Earache] : no earache [Nasal Discharge] : no nasal discharge [Chest Pain] : no chest pain [Orthopnea] : no orthopnea [Shortness Of Breath] : no shortness of breath [Abdominal Pain] : no abdominal pain [Vomiting] : no vomiting [Hematuria] : no hematuria [Joint Pain] : no joint pain [Muscle Pain] : no muscle pain [Itching] : no itching [Skin Rash] : no skin rash [Headache] : no headache [Memory Loss] : no memory loss [Suicidal] : not suicidal [Easy Bleeding] : no easy bleeding

## 2024-09-16 NOTE — PHYSICAL EXAM
[Well Developed] : well developed [Well Nourished] : well nourished [No Acute Distress] : no acute distress [Normal Conjunctiva] : normal conjunctiva [Normal Venous Pressure] : normal venous pressure [No Carotid Bruit] : no carotid bruit [Normal S1, S2] : normal S1, S2 [Clear Lung Fields] : clear lung fields [Good Air Entry] : good air entry [No Respiratory Distress] : no respiratory distress  [Soft] : abdomen soft [Non Tender] : non-tender [Normal Bowel Sounds] : normal bowel sounds [Normal Gait] : normal gait [No Edema] : no edema [No Cyanosis] : no cyanosis [No Clubbing] : no clubbing [No Varicosities] : no varicosities [No Rash] : no rash [No Skin Lesions] : no skin lesions [Moves all extremities] : moves all extremities [No Focal Deficits] : no focal deficits [Normal Speech] : normal speech [Alert and Oriented] : alert and oriented [Normal memory] : normal memory

## 2024-09-16 NOTE — PLAN
[FreeTextEntry1] : UTI - will start ciprofloxacin, check UA/Urine cx and routine labs Afib - stable, c/w same Anemia - check CBC, f/u with heme CAD - f/u with cardiology DM - check labs, c/w same, f/u with endocrinology HLD - c/w statin, check lipid panel HTN - controlled, c/w same

## 2024-09-17 ENCOUNTER — APPOINTMENT (OUTPATIENT)
Dept: UROLOGY | Facility: CLINIC | Age: 84
End: 2024-09-17
Payer: MEDICARE

## 2024-09-17 VITALS
BODY MASS INDEX: 24.59 KG/M2 | HEART RATE: 75 BPM | SYSTOLIC BLOOD PRESSURE: 131 MMHG | OXYGEN SATURATION: 97 % | DIASTOLIC BLOOD PRESSURE: 80 MMHG | WEIGHT: 153 LBS | HEIGHT: 66 IN | TEMPERATURE: 98.4 F

## 2024-09-17 DIAGNOSIS — N20.9 URINARY CALCULUS, UNSPECIFIED: ICD-10-CM

## 2024-09-17 DIAGNOSIS — R35.0 FREQUENCY OF MICTURITION: ICD-10-CM

## 2024-09-17 DIAGNOSIS — N20.0 CALCULUS OF KIDNEY: ICD-10-CM

## 2024-09-17 DIAGNOSIS — N26.1 ATROPHY OF KIDNEY (TERMINAL): ICD-10-CM

## 2024-09-17 PROCEDURE — 99214 OFFICE O/P EST MOD 30 MIN: CPT

## 2024-09-17 NOTE — HISTORY OF PRESENT ILLNESS
[FreeTextEntry1] : Loc Kelsey is an 82 y/o woman with Hx of TIA, Afib, PPM (A[employer]ott generator change [facility] 8/22/2019), HLD, HTN, DM, Emphysema CAD , s/p   {CI ( 7/14/23) who is here today for an initial evaluation secondary to afib and to transfer pacemaker checks and remote checks to [facility].  Pt reports she had a MDT device that was changed to [employer] 8/22/2019 when she was at [facility] and was done by Dr. Rodriguez. Quick check today notes Dr. Silva is the following physician but pt reports her checks are done by Dr. Brown and NP Fátima Lutz in [facility].  Pt recently switched from Eliquis to Xarelto for Afib management d/t cost with no s/s of bleeding. Quick check today with dual chamber device set at DDDR  AP 92% and  <1% with Mode switching for AF 6% and overall Afib burden 3.4%. She had last episodes 7/16/24 with 5 1/2 hours of AF with VR 90's and on 7/4/24, she had 1 day of AF. Pt states she feels well. She is taking amiodarone 200 mg and metoprolol 100 mg in am and 50 mg in pm. Denies chest pain, palpitations, SOB, syncope or near syncope.

## 2024-09-17 NOTE — ASSESSMENT
[FreeTextEntry1] : 83 year old female with hematuria and dysuria. Reviewed urine culture results with pt. Hematuria noted.   UA done today. Renal Ultrasound ordered today. RTO for Cystoscopy.

## 2024-09-17 NOTE — CARDIOLOGY SUMMARY
[de-identified] : 9//16/24  Atrial paced rhythm at 60 bpm [de-identified] : s/p negative lexiscan 11/16/23.  [de-identified] :  7/14/23 Successful PCI with NAVEEN to the mid LAD. Repeat cath 7/21/23- Coronary angiography demonstrated minor luminal irregularities patent stents to the lad and lcx,

## 2024-09-17 NOTE — DISCUSSION/SUMMARY
[EKG obtained to assist in diagnosis and management of assessed problem(s)] : EKG obtained to assist in diagnosis and management of assessed problem(s) [FreeTextEntry1] : Impression:  1. SND s/p PPM with generator change 8/22/19:  EKG performed today to assess for presence of appropriate pacing and reveals AP rhythm and quick device check with AF burden 3.4%. Device in good working condition with adequate pacing/sensing thresholds. Will request transfer of device checks and remotes to [facility].   2. Paroxysmal afib: review of PPM with frequent episodes of afib despite amiodarone. Rediscussed adverse effect profile of Amiodarone including need for frequent monitoring of TFTs, LFTs, Ophthalmology examination and PFTs. Will follow up in 3 months and will consider ablation if she has any increase in symptoms or increase in AF burden.   3.  HTN; Encourage heart healthy diet, sodium restriction and weight management.. Continue regular f/u with Cardiologist for further HTN management.  4. HLD: resume statin therapy as prescribed and regular f/u with Cardiologist for routine lipid monitoring and management.  Continue f/u with Cardiologist and RTO for f/u in 3 months on the same day as device check.

## 2024-09-17 NOTE — DISCUSSION/SUMMARY
[EKG obtained to assist in diagnosis and management of assessed problem(s)] : EKG obtained to assist in diagnosis and management of assessed problem(s) [FreeTextEntry1] : Impression:  1. SND s/p PPM with generator change 8/22/19:  EKG performed today to assess for presence of appropriate pacing and reveals AP rhythm and quick device check with AF burden 3.4%. Device in good working condition with adequate pacing/sensing thresholds. Will request transfer of device checks and remotes to [facility].   2. Paroxysmal afib: review of PPM with frequent episodes of afib despite amiodarone. Rediscussed adverse effect profile of Amiodarone including need for frequent monitoring of TFTs, LFTs, Ophthalmology examination and PFTs. Will follow up in 3 months and will consider ablation if she has any increase in symptoms or increase in AF burden.   3.  HTN; Encourage heart healthy diet, sodium restriction and weight management.. Continue regular f/u with Cardiologist for further HTN management.  4. HLD: resume statin therapy as prescribed and regular f/u with Cardiologist for routine lipid monitoring and management.  Continue f/u with Cardiologist and RTO for f/u in 3 months on the same day as device check.

## 2024-09-17 NOTE — END OF VISIT
[FreeTextEntry4] : This note was written by Ritika Self on 09/17/2024 actively solely Herbert Francois M.D. I, Ritika Self, am scribing for and in the presence of Herbert Francois M.D. in the following sections HISTORY OF PRESENT ILLNESS, PAST MEDICAL/FAMILY/SOCIAL HISTORY; REVIEW OF SYSTEMS; VITAL SIGNS; PHYSICAL EXAM; ASSESSMENT/PLAN. All medical record entries made by this scribe at , Herbert Francois M.D. direction and personally dictated by me on 09/17/2024. I personally performed the services described in the documentation, reviewed the documentation recorded by the scribe in my presence, and it accurately and completely records my words and actions.
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
## 2024-09-17 NOTE — CARDIOLOGY SUMMARY
[de-identified] : 9//16/24  Atrial paced rhythm at 60 bpm [de-identified] : s/p negative lexiscan 11/16/23.  [de-identified] :  7/14/23 Successful PCI with NAVEEN to the mid LAD. Repeat cath 7/21/23- Coronary angiography demonstrated minor luminal irregularities patent stents to the lad and lcx,

## 2024-09-17 NOTE — HISTORY OF PRESENT ILLNESS
[FreeTextEntry1] : Loc Kelsey is an 84 y/o woman with Hx of TIA, Afib, PPM (Abbott generator change [facility] 8/22/2019), HLD, HTN, DM, Emphysema CAD , s/p   {CI ( 7/14/23) who is here today for an initial evaluation secondary to afib and to transfer pacemaker checks and remote checks to [facility].  Pt reports she had a MDT device that was changed to [employer] 8/22/2019 when she was at [facility] and was done by Dr. Rodriguez. Quick check today notes Dr. Silva is the following physician but pt reports her checks are done by Dr. Brown and NP Fátima Lutz in [place].  Pt recently switched from Eliquis to Xarelto for Afib management d/t cost with no s/s of bleeding. Quick check today with dual chamber device set at DDDR  AP 92% and  <1% with Mode switching for AF 6% and overall Afib burden 3.4%. She had last episodes 7/16/24 with 5 1/2 hours of AF with VR 90's and on 7/4/24, she had 1 day of AF. Pt states she feels well. She is taking amiodarone 200 mg and metoprolol 100 mg in am and 50 mg in pm. Denies chest pain, palpitations, SOB, syncope or near syncope.

## 2024-09-26 ENCOUNTER — RX RENEWAL (OUTPATIENT)
Age: 84
End: 2024-09-26

## 2024-10-01 ENCOUNTER — APPOINTMENT (OUTPATIENT)
Dept: INTERNAL MEDICINE | Facility: CLINIC | Age: 84
End: 2024-10-01
Payer: MEDICARE

## 2024-10-01 ENCOUNTER — NON-APPOINTMENT (OUTPATIENT)
Age: 84
End: 2024-10-01

## 2024-10-01 VITALS
HEIGHT: 66 IN | DIASTOLIC BLOOD PRESSURE: 80 MMHG | HEART RATE: 61 BPM | TEMPERATURE: 98.3 F | OXYGEN SATURATION: 96 % | WEIGHT: 150 LBS | BODY MASS INDEX: 24.11 KG/M2 | SYSTOLIC BLOOD PRESSURE: 130 MMHG

## 2024-10-01 DIAGNOSIS — N20.0 CALCULUS OF KIDNEY: ICD-10-CM

## 2024-10-01 PROBLEM — R31.29 MICROSCOPIC HEMATURIA: Status: ACTIVE | Noted: 2024-10-01

## 2024-10-01 PROBLEM — R30.0 DYSURIA: Status: ACTIVE | Noted: 2024-10-01

## 2024-10-01 LAB
APPEARANCE: CLEAR
BACTERIA: NEGATIVE /HPF
BILIRUBIN URINE: NEGATIVE
BLOOD URINE: ABNORMAL
CAST: 4 /LPF
COLOR: YELLOW
EPITHELIAL CELLS: 2 /HPF
GLUCOSE QUALITATIVE U: 100 MG/DL
KETONES URINE: NEGATIVE MG/DL
LEUKOCYTE ESTERASE URINE: NEGATIVE
MICROSCOPIC-UA: NORMAL
NITRITE URINE: NEGATIVE
PH URINE: 5.5
PROTEIN URINE: NORMAL MG/DL
RED BLOOD CELLS URINE: 39 /HPF
SPECIFIC GRAVITY URINE: 1.02
UROBILINOGEN URINE: 0.2 MG/DL
WHITE BLOOD CELLS URINE: 0 /HPF

## 2024-10-01 PROCEDURE — 99214 OFFICE O/P EST MOD 30 MIN: CPT

## 2024-10-01 PROCEDURE — 93000 ELECTROCARDIOGRAM COMPLETE: CPT

## 2024-10-01 PROCEDURE — G2211 COMPLEX E/M VISIT ADD ON: CPT

## 2024-10-02 ENCOUNTER — APPOINTMENT (OUTPATIENT)
Dept: UROLOGY | Facility: CLINIC | Age: 84
End: 2024-10-02

## 2024-10-04 ENCOUNTER — APPOINTMENT (OUTPATIENT)
Dept: INTERNAL MEDICINE | Facility: CLINIC | Age: 84
End: 2024-10-04

## 2024-10-05 LAB — BACTERIA UR CULT: NORMAL

## 2024-10-07 NOTE — HISTORY OF PRESENT ILLNESS
[FreeTextEntry1] : f/u multiple issues [de-identified] : This is a 83 year y/o female with a PMHx of TIA, Afib, PPM, HLD, HTN, DM, Emphysema CAD presents today for follow up multiple issues. Saw Dr Hernandez on 9/13, was prescribed cipro for UTI after macrobid was not helping sample. Had cipro which interaction with her amio and wants to make sure she is ok, no side effects Saw EP Dr Gatica, will consider ablation if she has any increase in symptoms or increase in AF burden. Reports some dysuria, no f/c, abd pain, back pain  Denies chest pain, SOB, QUEZADA, dizziness, diaphoresis, palpitations, LE swelling, orthopnea, syncope, n/v, headache.

## 2024-10-07 NOTE — HEALTH RISK ASSESSMENT
[0] : 2) Feeling down, depressed, or hopeless: Not at all (0) [PHQ-2 Negative - No further assessment needed] : PHQ-2 Negative - No further assessment needed [Never] : Never [[id number]] : 0

## 2024-10-07 NOTE — ADDENDUM
[FreeTextEntry1] : This note was written by Julianne Mccoy on 10/1/2024 acting as medical scribe for Dr. Letty Bravo. I, Dr. Letty Bravo, have read and attest that all the information, medical decision making and discharge instructions within are true and accurate.

## 2024-10-07 NOTE — HEALTH RISK ASSESSMENT
[0] : 2) Feeling down, depressed, or hopeless: Not at all (0) [PHQ-2 Negative - No further assessment needed] : PHQ-2 Negative - No further assessment needed [Never] : Never [[id number]] : 0

## 2024-10-07 NOTE — HISTORY OF PRESENT ILLNESS
[FreeTextEntry1] : f/u multiple issues [de-identified] : This is a 83 year y/o female with a PMHx of TIA, Afib, PPM, HLD, HTN, DM, Emphysema CAD presents today for follow up multiple issues. Saw Dr Hernandez on 9/13, was prescribed cipro for UTI after macrobid was not helping sample. Had cipro which interaction with her amio and wants to make sure she is ok, no side effects Saw EP Dr Gatica, will consider ablation if she has any increase in symptoms or increase in AF burden. Reports some dysuria, no f/c, abd pain, back pain  Denies chest pain, SOB, QUEZADA, dizziness, diaphoresis, palpitations, LE swelling, orthopnea, syncope, n/v, headache.

## 2024-10-07 NOTE — HISTORY OF PRESENT ILLNESS
[FreeTextEntry1] : f/u multiple issues [de-identified] : This is a 83 year y/o female with a PMHx of TIA, Afib, PPM, HLD, HTN, DM, Emphysema CAD presents today for follow up multiple issues. Saw Dr Hernandez on 9/13, was prescribed cipro for UTI after macrobid was not helping sample. Had cipro which interaction with her amio and wants to make sure she is ok, no side effects Saw EP Dr Gatica, will consider ablation if she has any increase in symptoms or increase in AF burden. Reports some dysuria, no f/c, abd pain, back pain  Denies chest pain, SOB, QUEZADA, dizziness, diaphoresis, palpitations, LE swelling, orthopnea, syncope, n/v, headache.

## 2024-10-07 NOTE — HEALTH RISK ASSESSMENT
[0] : 2) Feeling down, depressed, or hopeless: Not at all (0) [PHQ-2 Negative - No further assessment needed] : PHQ-2 Negative - No further assessment needed [Never] : Never [[id number]] : 0

## 2024-10-10 ENCOUNTER — APPOINTMENT (OUTPATIENT)
Dept: CARDIOLOGY | Facility: CLINIC | Age: 84
End: 2024-10-10
Payer: MEDICARE

## 2024-10-10 VITALS
HEIGHT: 66 IN | SYSTOLIC BLOOD PRESSURE: 142 MMHG | DIASTOLIC BLOOD PRESSURE: 70 MMHG | OXYGEN SATURATION: 98 % | WEIGHT: 147 LBS | TEMPERATURE: 98.6 F | HEART RATE: 60 BPM | BODY MASS INDEX: 23.63 KG/M2

## 2024-10-10 DIAGNOSIS — R31.29 OTHER MICROSCOPIC HEMATURIA: ICD-10-CM

## 2024-10-10 DIAGNOSIS — R25.2 CRAMP AND SPASM: ICD-10-CM

## 2024-10-10 DIAGNOSIS — D64.9 ANEMIA, UNSPECIFIED: ICD-10-CM

## 2024-10-10 DIAGNOSIS — I25.10 ATHEROSCLEROTIC HEART DISEASE OF NATIVE CORONARY ARTERY W/OUT ANGINA PECTORIS: ICD-10-CM

## 2024-10-10 DIAGNOSIS — R07.9 CHEST PAIN, UNSPECIFIED: ICD-10-CM

## 2024-10-10 DIAGNOSIS — E11.9 TYPE 2 DIABETES MELLITUS W/OUT COMPLICATIONS: ICD-10-CM

## 2024-10-10 DIAGNOSIS — R30.0 DYSURIA: ICD-10-CM

## 2024-10-10 DIAGNOSIS — N26.1 ATROPHY OF KIDNEY (TERMINAL): ICD-10-CM

## 2024-10-10 DIAGNOSIS — I10 ESSENTIAL (PRIMARY) HYPERTENSION: ICD-10-CM

## 2024-10-10 DIAGNOSIS — Z95.0 PRESENCE OF CARDIAC PACEMAKER: ICD-10-CM

## 2024-10-10 DIAGNOSIS — I48.91 UNSPECIFIED ATRIAL FIBRILLATION: ICD-10-CM

## 2024-10-10 DIAGNOSIS — J43.9 EMPHYSEMA, UNSPECIFIED: ICD-10-CM

## 2024-10-10 DIAGNOSIS — N20.9 URINARY CALCULUS, UNSPECIFIED: ICD-10-CM

## 2024-10-10 DIAGNOSIS — Z71.85 ENCOUNTER FOR IMMUNIZATION SAFETY COUNSELING: ICD-10-CM

## 2024-10-10 DIAGNOSIS — E78.5 HYPERLIPIDEMIA, UNSPECIFIED: ICD-10-CM

## 2024-10-10 PROCEDURE — 93306 TTE W/DOPPLER COMPLETE: CPT

## 2024-10-10 PROCEDURE — 99214 OFFICE O/P EST MOD 30 MIN: CPT

## 2024-10-10 PROCEDURE — 93000 ELECTROCARDIOGRAM COMPLETE: CPT

## 2024-10-10 PROCEDURE — G2211 COMPLEX E/M VISIT ADD ON: CPT

## 2024-10-15 ENCOUNTER — NON-APPOINTMENT (OUTPATIENT)
Age: 84
End: 2024-10-15

## 2024-10-15 LAB
BASOPHILS # BLD AUTO: 0.06 K/UL
BASOPHILS NFR BLD AUTO: 1.2 %
EOSINOPHIL # BLD AUTO: 0.09 K/UL
EOSINOPHIL NFR BLD AUTO: 1.8 %
FERRITIN SERPL-MCNC: 31 NG/ML
FOLATE SERPL-MCNC: 11.3 NG/ML
HCT VFR BLD CALC: 36.4 %
HGB BLD-MCNC: 11.5 G/DL
IMM GRANULOCYTES NFR BLD AUTO: 0.2 %
IRON SATN MFR SERPL: 14 %
IRON SERPL-MCNC: 56 UG/DL
LYMPHOCYTES # BLD AUTO: 1.65 K/UL
LYMPHOCYTES NFR BLD AUTO: 33.1 %
MAN DIFF?: NORMAL
MCHC RBC-ENTMCNC: 26.5 PG
MCHC RBC-ENTMCNC: 31.6 GM/DL
MCV RBC AUTO: 83.9 FL
MONOCYTES # BLD AUTO: 0.54 K/UL
MONOCYTES NFR BLD AUTO: 10.8 %
NEUTROPHILS # BLD AUTO: 2.63 K/UL
NEUTROPHILS NFR BLD AUTO: 52.9 %
PLATELET # BLD AUTO: 214 K/UL
RBC # BLD: 4.34 M/UL
RBC # FLD: 14.9 %
TIBC SERPL-MCNC: 384 UG/DL
TRANSFERRIN SERPL-MCNC: 317 MG/DL
UIBC SERPL-MCNC: 329 UG/DL
VIT B12 SERPL-MCNC: 1347 PG/ML
WBC # FLD AUTO: 4.98 K/UL

## 2024-11-25 ENCOUNTER — APPOINTMENT (OUTPATIENT)
Dept: PULMONOLOGY | Facility: CLINIC | Age: 84
End: 2024-11-25
Payer: MEDICARE

## 2024-11-25 VITALS
OXYGEN SATURATION: 96 % | HEART RATE: 73 BPM | RESPIRATION RATE: 16 BRPM | SYSTOLIC BLOOD PRESSURE: 125 MMHG | DIASTOLIC BLOOD PRESSURE: 77 MMHG

## 2024-11-25 DIAGNOSIS — J43.9 EMPHYSEMA, UNSPECIFIED: ICD-10-CM

## 2024-11-25 PROCEDURE — 99213 OFFICE O/P EST LOW 20 MIN: CPT | Mod: 25

## 2024-11-25 PROCEDURE — 36415 COLL VENOUS BLD VENIPUNCTURE: CPT

## 2024-11-25 PROCEDURE — 94060 EVALUATION OF WHEEZING: CPT

## 2024-11-25 RX ORDER — FLUTICASONE FUROATE, UMECLIDINIUM BROMIDE AND VILANTEROL TRIFENATATE 100; 62.5; 25 UG/1; UG/1; UG/1
100-62.5-25 POWDER RESPIRATORY (INHALATION)
Qty: 1 | Refills: 2 | Status: ACTIVE | COMMUNITY
Start: 2024-11-25 | End: 1900-01-01

## 2024-12-02 ENCOUNTER — RX RENEWAL (OUTPATIENT)
Age: 84
End: 2024-12-02

## 2024-12-16 ENCOUNTER — APPOINTMENT (OUTPATIENT)
Dept: ELECTROPHYSIOLOGY | Facility: CLINIC | Age: 84
End: 2024-12-16

## 2024-12-18 ENCOUNTER — APPOINTMENT (OUTPATIENT)
Dept: PULMONOLOGY | Facility: CLINIC | Age: 84
End: 2024-12-18
Payer: MEDICARE

## 2024-12-18 VITALS — OXYGEN SATURATION: 96 % | HEART RATE: 60 BPM | SYSTOLIC BLOOD PRESSURE: 150 MMHG | DIASTOLIC BLOOD PRESSURE: 79 MMHG

## 2024-12-18 DIAGNOSIS — R93.89 ABNORMAL FINDINGS ON DIAGNOSTIC IMAGING OF OTHER SPECIFIED BODY STRUCTURES: ICD-10-CM

## 2024-12-18 DIAGNOSIS — J44.1 CHRONIC OBSTRUCTIVE PULMONARY DISEASE WITH (ACUTE) EXACERBATION: ICD-10-CM

## 2024-12-18 PROCEDURE — 95012 NITRIC OXIDE EXP GAS DETER: CPT

## 2024-12-18 PROCEDURE — 71046 X-RAY EXAM CHEST 2 VIEWS: CPT

## 2024-12-18 PROCEDURE — 99214 OFFICE O/P EST MOD 30 MIN: CPT | Mod: 25

## 2024-12-18 PROCEDURE — 94010 BREATHING CAPACITY TEST: CPT

## 2024-12-18 PROCEDURE — 94727 GAS DIL/WSHOT DETER LNG VOL: CPT

## 2024-12-18 PROCEDURE — 94729 DIFFUSING CAPACITY: CPT

## 2024-12-18 RX ORDER — PREDNISONE 10 MG/1
10 TABLET ORAL
Qty: 9 | Refills: 0 | Status: ACTIVE | COMMUNITY
Start: 2024-12-18 | End: 1900-01-01

## 2024-12-19 ENCOUNTER — APPOINTMENT (OUTPATIENT)
Dept: INTERNAL MEDICINE | Facility: CLINIC | Age: 84
End: 2024-12-19
Payer: MEDICARE

## 2024-12-19 ENCOUNTER — APPOINTMENT (OUTPATIENT)
Dept: CARDIOLOGY | Facility: CLINIC | Age: 84
End: 2024-12-19
Payer: MEDICARE

## 2024-12-19 ENCOUNTER — NON-APPOINTMENT (OUTPATIENT)
Age: 84
End: 2024-12-19

## 2024-12-19 VITALS
HEART RATE: 62 BPM | HEIGHT: 66 IN | DIASTOLIC BLOOD PRESSURE: 82 MMHG | BODY MASS INDEX: 25.55 KG/M2 | SYSTOLIC BLOOD PRESSURE: 140 MMHG | TEMPERATURE: 98 F | WEIGHT: 159 LBS | OXYGEN SATURATION: 97 %

## 2024-12-19 VITALS — SYSTOLIC BLOOD PRESSURE: 170 MMHG | DIASTOLIC BLOOD PRESSURE: 90 MMHG

## 2024-12-19 DIAGNOSIS — I25.10 ATHEROSCLEROTIC HEART DISEASE OF NATIVE CORONARY ARTERY W/OUT ANGINA PECTORIS: ICD-10-CM

## 2024-12-19 DIAGNOSIS — I10 ESSENTIAL (PRIMARY) HYPERTENSION: ICD-10-CM

## 2024-12-19 DIAGNOSIS — R06.09 OTHER FORMS OF DYSPNEA: ICD-10-CM

## 2024-12-19 DIAGNOSIS — D64.9 ANEMIA, UNSPECIFIED: ICD-10-CM

## 2024-12-19 DIAGNOSIS — J43.9 EMPHYSEMA, UNSPECIFIED: ICD-10-CM

## 2024-12-19 DIAGNOSIS — I48.91 UNSPECIFIED ATRIAL FIBRILLATION: ICD-10-CM

## 2024-12-19 DIAGNOSIS — R60.0 LOCALIZED EDEMA: ICD-10-CM

## 2024-12-19 PROBLEM — J44.1 COPD EXACERBATION: Status: ACTIVE | Noted: 2024-12-19

## 2024-12-19 LAB
ALBUMIN SERPL ELPH-MCNC: 4.2 G/DL
ALP BLD-CCNC: 96 U/L
ALT SERPL-CCNC: 28 U/L
ANION GAP SERPL CALC-SCNC: 16 MMOL/L
AST SERPL-CCNC: 27 U/L
BASOPHILS # BLD AUTO: 0.08 K/UL
BASOPHILS NFR BLD AUTO: 1.5 %
BILIRUB SERPL-MCNC: 0.7 MG/DL
BUN SERPL-MCNC: 26 MG/DL
CALCIUM SERPL-MCNC: 9.6 MG/DL
CHLORIDE SERPL-SCNC: 103 MMOL/L
CK SERPL-CCNC: 131 U/L
CO2 SERPL-SCNC: 21 MMOL/L
CREAT SERPL-MCNC: 1.27 MG/DL
EGFR: 42 ML/MIN/1.73M2
EOSINOPHIL # BLD AUTO: 0.19 K/UL
EOSINOPHIL NFR BLD AUTO: 3.5 %
GLUCOSE SERPL-MCNC: 115 MG/DL
HCT VFR BLD CALC: 31.7 %
HGB BLD-MCNC: 10.1 G/DL
IMM GRANULOCYTES NFR BLD AUTO: 0.4 %
LYMPHOCYTES # BLD AUTO: 1.49 K/UL
LYMPHOCYTES NFR BLD AUTO: 27.4 %
MAN DIFF?: NORMAL
MCHC RBC-ENTMCNC: 26 PG
MCHC RBC-ENTMCNC: 31.9 G/DL
MCV RBC AUTO: 81.5 FL
MONOCYTES # BLD AUTO: 0.68 K/UL
MONOCYTES NFR BLD AUTO: 12.5 %
NEUTROPHILS # BLD AUTO: 2.98 K/UL
NEUTROPHILS NFR BLD AUTO: 54.7 %
NT-PROBNP SERPL-MCNC: 899 PG/ML
PLATELET # BLD AUTO: 271 K/UL
POTASSIUM SERPL-SCNC: 4.5 MMOL/L
PROT SERPL-MCNC: 6.9 G/DL
RBC # BLD: 3.89 M/UL
RBC # FLD: 16.1 %
SODIUM SERPL-SCNC: 140 MMOL/L
T4 FREE SERPL-MCNC: 1.8 NG/DL
TSH SERPL-ACNC: 1.63 UIU/ML
WBC # FLD AUTO: 5.44 K/UL

## 2024-12-19 PROCEDURE — G2211 COMPLEX E/M VISIT ADD ON: CPT

## 2024-12-19 PROCEDURE — 93970 EXTREMITY STUDY: CPT

## 2024-12-19 PROCEDURE — 99214 OFFICE O/P EST MOD 30 MIN: CPT

## 2024-12-19 PROCEDURE — 93000 ELECTROCARDIOGRAM COMPLETE: CPT

## 2024-12-19 RX ORDER — HYDROCHLOROTHIAZIDE 25 MG/1
25 TABLET ORAL DAILY
Qty: 90 | Refills: 0 | Status: ACTIVE | COMMUNITY
Start: 2024-12-19 | End: 1900-01-01

## 2024-12-20 DIAGNOSIS — M71.20 SYNOVIAL CYST OF POPLITEAL SPACE [BAKER], UNSPECIFIED KNEE: ICD-10-CM

## 2024-12-20 NOTE — HISTORY OF PRESENT ILLNESS
Placed referral to urology for epididymitis/spermatocele.      [FreeTextEntry1] : This is a 81 y/o female with a pmhx of CAD s/p PCI 4/3/2023, TIA, Afib, PPM, HLD, HTN, DM, Emphysema here for follow up .pt s/p cardiac cath on 7/14 Successful PCI with NAVEEN to the mid LAD.  The stents in the LCX and RCA\par were patent.  Apt is on SA and plavix with NOAC x 1 week post cath and then d/c ASA.  \par pt reports SOB more since cath. pt also reports chest pain as wellpt also with cough and congestion

## 2024-12-23 PROBLEM — R60.0 BILATERAL LEG EDEMA: Status: ACTIVE | Noted: 2024-12-19

## 2024-12-30 ENCOUNTER — APPOINTMENT (OUTPATIENT)
Dept: CT IMAGING | Facility: CLINIC | Age: 84
End: 2024-12-30

## 2024-12-30 NOTE — PATIENT PROFILE ADULT - NSPROGENBLOODRESTRICT_GEN_A_NUR
Chief Complaint: Well Woman Exam     HPI:      Lily is a 22 y.o.  who presents today for well woman exam.      Denies any breast, vaginal, urinary complaints or pelvic pain today.  She is c/o urinary frequency and mild discomfort w/ urination.     Menses are regular. Patient's last menstrual period was 2024 (exact date).   Periods typically every 28 days, last 5 days w/ light to moderate flow.   Previous period was about 2 weeks late but was a normal flow and most recently flow was lighter than normal.     Lily is not currently sexually active. She is currently using abstinence for contraception. She is interested in discussing Paragard IUD. She would like STI screening today.    Has never had a pap test prior.     Gardasil:Completed     She denies family history of breast, GYN, colon, or  cancers.      She -- participate in regular exercise.  She denies tobacco use.     PCP: Jessica Álvarez MD    Past Medical History:   Diagnosis Date    Eczema     Tuberculosis contact     mom with pulm tb, noncompliant with inh, ppd neg x 2 since then.       Current Outpatient Medications:     amoxicillin (AMOXIL) 250 mg/5 mL suspension, Take 15 mLs (750 mg total) by mouth 2 (two) times daily., Disp: 210 mL, Rfl: 0    ceramides 1,3,6-II (CERAVE) Crea, Apply topically., Disp: , Rfl:     clindamycin (CLEOCIN T) 1 % lotion, Apply topically every morning. face, Disp: 60 mL, Rfl: 3    EUCRISA 2 % Oint, apply twice a day, Disp: 100 g, Rfl: 3    fluocinonide 0.05% (LIDEX) 0.05 % cream, AAA bid prn eczema flare.Stop using steroid topical when skin is smooth and non itchy.  Do not treat dark or red coloring., Disp: 60 g, Rfl: 0    spironolactone (ALDACTONE) 50 MG tablet, Take 1 tablet (50 mg total) by mouth every evening., Disp: 30 tablet, Rfl: 11    tretinoin (RETIN-A) 0.1 % cream, Apply topically every evening. Start with every other night and move up to nightly after 2 weeks if not too dry., Disp: 20 g, Rfl: 3     "hydrocortisone butyrate (LOCOID) 0.1 % Crea cream, AAA right antecubital fossa bid (Patient not taking: Reported on 4/18/2024), Disp: 45 g, Rfl: 3    ketoconazole (NIZORAL) 2 % cream, Apply topically 2 (two) times daily. For different flaky rash of the face prn. (Patient not taking: Reported on 4/18/2024), Disp: 60 g, Rfl: 1    ketoconazole (NIZORAL) 2 % shampoo, Apply topically once a week. (Patient not taking: Reported on 4/18/2024), Disp: 120 mL, Rfl: 2   Review of patient's allergies indicates:   Allergen Reactions    Fish containing products Itching    Tree nuts Itching     Past Surgical History:   Procedure Laterality Date    WISDOM TOOTH EXTRACTION       Social History     Tobacco Use    Smoking status: Never    Smokeless tobacco: Never   Substance Use Topics    Alcohol use: Yes     Comment: occ    Drug use: Never     Family History   Problem Relation Name Age of Onset    Thyroid disease Paternal Grandfather      Rheumatologic disease Maternal Grandmother      Hypertension Father      Thyroid disease Father      Asthma Mother Nichell     Stroke Neg Hx      Strabismus Neg Hx      Retinal detachment Neg Hx      Macular degeneration Neg Hx      Glaucoma Neg Hx      Diabetes Neg Hx      Cancer Neg Hx      Blindness Neg Hx      Amblyopia Neg Hx      Breast cancer Neg Hx      Colon cancer Neg Hx      Ovarian cancer Neg Hx      Uterine cancer Neg Hx      Cervical cancer Neg Hx         ROS:     GENERAL: Feeling well overall.   CARDIOVASCULAR: Denies palpitations or chest pain.   RESPIRATORY: Denies shortness of breath.  BREASTS: see HPI.  ABDOMEN: Denies constipation, diarrhea, blood in stool.  URINARY: see HPI.  REPRODUCTIVE: see HPI.  PSYCHIATRIC: Denies uncontrolled depression or anxiety.    Physical Exam:     /78   Ht 5' 2.99" (1.6 m)   Wt 59.7 kg (131 lb 9.8 oz)   LMP 12/23/2024 (Exact Date)   BMI 23.32 kg/m²   Body mass index is 23.32 kg/m².     APPEARANCE: Well nourished, well developed, in no acute " distress.  PSYCH: Appropriate mood and affect.  SKIN: No acne or hirsutism.  CARDIOVASCULAR: Regular rate and rhythm. No edema of peripheral extremities.  PULMONARY: Effort and breath sounds normal.  NECK: Neck symmetric without masses. No thyromegaly.  NODES: No axillary lymph node enlargement.  BREASTS: Deferred  PELVIC: Normal external genitalia without lesions.  Normal hair distribution.  Adequate perineal body, normal urethral meatus.  Vagina moist and well rugated. Without lesions. Vagina without abnormal discharge.  Cervix without lesions, abnormal discharge, or tenderness.. No significant cystocele or rectocele.  Bimanual exam  deferred - low risk, asymptomatic .      Verbal consent obtained    Chaperoned by: MARIANNE Sarah    Assessment/Plan:     Routine gynecological examination  -     Liquid-Based Pap Smear, Screening  -     C. trachomatis/N. gonorrhoeae by AMP DNA [facility]; Cervicovaginal  -     Hepatitis B Surface Antigen; Future; Expected date: 12/30/2024  -     Hepatitis C Antibody; Future; Expected date: 12/30/2024  -     HIV 1/2 Ag/Ab (4th Gen); Future; Expected date: 12/30/2024  -     Treponema Pallidium Antibodies IgG, IgM; Future; Expected date: 12/30/2024    Screening for cervical cancer  -     Liquid-Based Pap Smear, Screening    Routine screening for STI (sexually transmitted infection)  -     C. trachomatis/N. gonorrhoeae by AMP DNA [facility]; Cervicovaginal  -     Hepatitis B Surface Antigen; Future; Expected date: 12/30/2024  -     Hepatitis C Antibody; Future; Expected date: 12/30/2024  -     HIV 1/2 Ag/Ab (4th Gen); Future; Expected date: 12/30/2024  -     Treponema Pallidium Antibodies IgG, IgM; Future; Expected date: 12/30/2024    Urinary frequency  -     POCT URINALYSIS        PLAN:    Pap test collected  GC/CT collected; serum STI screening ordered  Gardasil up to date  Contraception - discussed R/B/As of non hormonal versus hormonal IUD - she is considering Paragard IUD and will notify  office if she desires placement. Today's discussion included:  Risks of IUD placement including but not limited to infection, uterine perforation, migration of device, or IUD expulsion.  Potential changes in bleeding patterns from increased bleeding, intermenstrual spotting, or amenorrhea.   The 0.2% risk of pregnancy with an IUD in place. Patient was thoroughly counseled that if she does become pregnant while she has an IUD, there is an increased risk of both miscarriage and ectopic pregnancy. She was advised to seek medical care immediately if she were to become pregnant.  The need for barrier contraception to prevent exposure to sexually transmitted infections.     Follow up in about 1 year (around 12/30/2025) for WWE or sooner for IUD insertion if desired - recall placed .    Counseling:     Patient was counseled today on the recommendation for yearly wellness exams, importance of breast self awareness and annual mammograms, as well as the current ASCCP pap guidelines. She was counseled on importance of regular exercise. She is to see her PCP for other health maintenance.     Use of the [employer] Patient Portal discussed and encouraged during today's visit.      none

## 2025-01-02 ENCOUNTER — APPOINTMENT (OUTPATIENT)
Dept: INTERNAL MEDICINE | Facility: CLINIC | Age: 85
End: 2025-01-02

## 2025-02-14 ENCOUNTER — APPOINTMENT (OUTPATIENT)
Dept: CARDIOLOGY | Facility: CLINIC | Age: 85
End: 2025-02-14
Payer: MEDICARE

## 2025-02-14 VITALS
BODY MASS INDEX: 24.59 KG/M2 | TEMPERATURE: 97.7 F | OXYGEN SATURATION: 96 % | HEART RATE: 71 BPM | SYSTOLIC BLOOD PRESSURE: 158 MMHG | HEIGHT: 66 IN | WEIGHT: 153 LBS | DIASTOLIC BLOOD PRESSURE: 70 MMHG

## 2025-02-14 DIAGNOSIS — J43.9 EMPHYSEMA, UNSPECIFIED: ICD-10-CM

## 2025-02-14 DIAGNOSIS — R25.2 CRAMP AND SPASM: ICD-10-CM

## 2025-02-14 DIAGNOSIS — R94.6 ABNORMAL RESULTS OF THYROID FUNCTION STUDIES: ICD-10-CM

## 2025-02-14 DIAGNOSIS — I48.0 PAROXYSMAL ATRIAL FIBRILLATION: ICD-10-CM

## 2025-02-14 DIAGNOSIS — R06.09 OTHER FORMS OF DYSPNEA: ICD-10-CM

## 2025-02-14 DIAGNOSIS — E11.9 TYPE 2 DIABETES MELLITUS W/OUT COMPLICATIONS: ICD-10-CM

## 2025-02-14 DIAGNOSIS — G45.9 TRANSIENT CEREBRAL ISCHEMIC ATTACK, UNSPECIFIED: ICD-10-CM

## 2025-02-14 DIAGNOSIS — I10 ESSENTIAL (PRIMARY) HYPERTENSION: ICD-10-CM

## 2025-02-14 DIAGNOSIS — I25.10 ATHEROSCLEROTIC HEART DISEASE OF NATIVE CORONARY ARTERY W/OUT ANGINA PECTORIS: ICD-10-CM

## 2025-02-14 DIAGNOSIS — D64.9 ANEMIA, UNSPECIFIED: ICD-10-CM

## 2025-02-14 DIAGNOSIS — N26.1 ATROPHY OF KIDNEY (TERMINAL): ICD-10-CM

## 2025-02-14 DIAGNOSIS — E78.5 HYPERLIPIDEMIA, UNSPECIFIED: ICD-10-CM

## 2025-02-14 PROCEDURE — 93000 ELECTROCARDIOGRAM COMPLETE: CPT

## 2025-02-14 PROCEDURE — G2211 COMPLEX E/M VISIT ADD ON: CPT

## 2025-02-14 PROCEDURE — 99214 OFFICE O/P EST MOD 30 MIN: CPT

## 2025-02-14 RX ORDER — AMLODIPINE BESYLATE 2.5 MG/1
2.5 TABLET ORAL DAILY
Qty: 90 | Refills: 1 | Status: ACTIVE | COMMUNITY
Start: 2025-02-14 | End: 1900-01-01

## 2025-02-18 ENCOUNTER — NON-APPOINTMENT (OUTPATIENT)
Age: 85
End: 2025-02-18

## 2025-02-18 LAB
ALBUMIN SERPL ELPH-MCNC: 4.5 G/DL
ALP BLD-CCNC: 92 U/L
ALT SERPL-CCNC: 21 U/L
ANION GAP SERPL CALC-SCNC: 14 MMOL/L
AST SERPL-CCNC: 22 U/L
BASOPHILS # BLD AUTO: 0.08 K/UL
BASOPHILS NFR BLD AUTO: 1.3 %
BILIRUB DIRECT SERPL-MCNC: 0.2 MG/DL
BILIRUB INDIRECT SERPL-MCNC: 0.4 MG/DL
BILIRUB SERPL-MCNC: 0.5 MG/DL
BUN SERPL-MCNC: 21 MG/DL
CALCIUM SERPL-MCNC: 9.7 MG/DL
CHLORIDE SERPL-SCNC: 99 MMOL/L
CHOLEST SERPL-MCNC: 175 MG/DL
CK SERPL-CCNC: 79 U/L
CO2 SERPL-SCNC: 22 MMOL/L
CREAT SERPL-MCNC: 1.17 MG/DL
CREAT SPEC-SCNC: 82 MG/DL
EGFR: 46 ML/MIN/1.73M2
EOSINOPHIL # BLD AUTO: 0.1 K/UL
EOSINOPHIL NFR BLD AUTO: 1.7 %
ESTIMATED AVERAGE GLUCOSE: 212 MG/DL
FERRITIN SERPL-MCNC: 40 NG/ML
FOLATE SERPL-MCNC: 9.1 NG/ML
GLUCOSE SERPL-MCNC: 182 MG/DL
HBA1C MFR BLD HPLC: 9 %
HCT VFR BLD CALC: 36.5 %
HDLC SERPL-MCNC: 80 MG/DL
HGB A MFR BLD: 97.6 %
HGB A2 MFR BLD: 2.4 %
HGB BLD-MCNC: 11.2 G/DL
HGB FRACT BLD-IMP: NORMAL
IMM GRANULOCYTES NFR BLD AUTO: 0.3 %
IRON SATN MFR SERPL: 10 %
IRON SERPL-MCNC: 41 UG/DL
LDLC SERPL CALC-MCNC: 77 MG/DL
LYMPHOCYTES # BLD AUTO: 2.12 K/UL
LYMPHOCYTES NFR BLD AUTO: 35.1 %
MAGNESIUM SERPL-MCNC: 2 MG/DL
MAN DIFF?: NORMAL
MCHC RBC-ENTMCNC: 25.3 PG
MCHC RBC-ENTMCNC: 30.7 G/DL
MCV RBC AUTO: 82.4 FL
MICROALBUMIN 24H UR DL<=1MG/L-MCNC: 1.5 MG/DL
MICROALBUMIN/CREAT 24H UR-RTO: 18 MG/G
MONOCYTES # BLD AUTO: 0.7 K/UL
MONOCYTES NFR BLD AUTO: 11.6 %
NEUTROPHILS # BLD AUTO: 3.02 K/UL
NEUTROPHILS NFR BLD AUTO: 50 %
NONHDLC SERPL-MCNC: 94 MG/DL
NT-PROBNP SERPL-MCNC: 467 PG/ML
PHOSPHATE SERPL-MCNC: 2.6 MG/DL
PLATELET # BLD AUTO: 271 K/UL
POTASSIUM SERPL-SCNC: 4.8 MMOL/L
PROT SERPL-MCNC: 7.2 G/DL
RBC # BLD: 4.43 M/UL
RBC # FLD: 15.7 %
SODIUM SERPL-SCNC: 135 MMOL/L
T4 FREE SERPL-MCNC: 2.1 NG/DL
THYROGLOB AB SERPL-ACNC: 15.6 IU/ML
THYROPEROXIDASE AB SERPL IA-ACNC: 16.6 IU/ML
TIBC SERPL-MCNC: 389 UG/DL
TRANSFERRIN SERPL-MCNC: 328 MG/DL
TRIGL SERPL-MCNC: 100 MG/DL
TSH SERPL-ACNC: 1.46 UIU/ML
TSI ACT/NOR SER: <0.1 IU/L
UIBC SERPL-MCNC: 348 UG/DL
URATE SERPL-MCNC: 5.2 MG/DL
VIT B12 SERPL-MCNC: >2000 PG/ML
WBC # FLD AUTO: 6.04 K/UL

## 2025-02-20 RX ORDER — APIXABAN 5 MG/1
5 TABLET, FILM COATED ORAL
Qty: 180 | Refills: 3 | Status: ACTIVE | COMMUNITY
Start: 2025-02-20 | End: 1900-01-01

## 2025-02-24 ENCOUNTER — NON-APPOINTMENT (OUTPATIENT)
Age: 85
End: 2025-02-24

## 2025-02-24 ENCOUNTER — APPOINTMENT (OUTPATIENT)
Dept: PULMONOLOGY | Facility: CLINIC | Age: 85
End: 2025-02-24
Payer: MEDICARE

## 2025-02-24 VITALS — SYSTOLIC BLOOD PRESSURE: 112 MMHG | OXYGEN SATURATION: 98 % | HEART RATE: 60 BPM | DIASTOLIC BLOOD PRESSURE: 66 MMHG

## 2025-02-24 DIAGNOSIS — J43.9 EMPHYSEMA, UNSPECIFIED: ICD-10-CM

## 2025-02-24 PROCEDURE — 99213 OFFICE O/P EST LOW 20 MIN: CPT | Mod: 25

## 2025-02-24 PROCEDURE — 94010 BREATHING CAPACITY TEST: CPT

## 2025-03-03 ENCOUNTER — APPOINTMENT (OUTPATIENT)
Dept: CARDIOLOGY | Facility: CLINIC | Age: 85
End: 2025-03-03
Payer: MEDICARE

## 2025-03-03 PROCEDURE — 93015 CV STRESS TEST SUPVJ I&R: CPT

## 2025-03-03 PROCEDURE — A9500: CPT

## 2025-03-03 PROCEDURE — 78452 HT MUSCLE IMAGE SPECT MULT: CPT

## 2025-03-03 RX ORDER — REGADENOSON 0.08 MG/ML
0.4 INJECTION, SOLUTION INTRAVENOUS
Qty: 1 | Refills: 0 | Status: COMPLETED | OUTPATIENT
Start: 2025-03-03

## 2025-03-03 RX ADMIN — REGADENOSON 4 MG/5ML: 0.08 INJECTION, SOLUTION INTRAVENOUS at 00:00

## 2025-03-04 ENCOUNTER — NON-APPOINTMENT (OUTPATIENT)
Age: 85
End: 2025-03-04

## 2025-03-24 ENCOUNTER — APPOINTMENT (OUTPATIENT)
Dept: CARDIOLOGY | Facility: CLINIC | Age: 85
End: 2025-03-24
Payer: MEDICARE

## 2025-03-24 VITALS
SYSTOLIC BLOOD PRESSURE: 168 MMHG | WEIGHT: 154 LBS | RESPIRATION RATE: 16 BRPM | DIASTOLIC BLOOD PRESSURE: 72 MMHG | OXYGEN SATURATION: 98 % | TEMPERATURE: 97.6 F | HEIGHT: 66 IN | HEART RATE: 60 BPM | BODY MASS INDEX: 24.75 KG/M2

## 2025-03-24 DIAGNOSIS — I48.0 PAROXYSMAL ATRIAL FIBRILLATION: ICD-10-CM

## 2025-03-24 DIAGNOSIS — E78.5 HYPERLIPIDEMIA, UNSPECIFIED: ICD-10-CM

## 2025-03-24 DIAGNOSIS — N26.1 ATROPHY OF KIDNEY (TERMINAL): ICD-10-CM

## 2025-03-24 DIAGNOSIS — J43.9 EMPHYSEMA, UNSPECIFIED: ICD-10-CM

## 2025-03-24 DIAGNOSIS — R31.29 OTHER MICROSCOPIC HEMATURIA: ICD-10-CM

## 2025-03-24 DIAGNOSIS — I25.10 ATHEROSCLEROTIC HEART DISEASE OF NATIVE CORONARY ARTERY W/OUT ANGINA PECTORIS: ICD-10-CM

## 2025-03-24 DIAGNOSIS — R06.09 OTHER FORMS OF DYSPNEA: ICD-10-CM

## 2025-03-24 DIAGNOSIS — E11.9 TYPE 2 DIABETES MELLITUS W/OUT COMPLICATIONS: ICD-10-CM

## 2025-03-24 DIAGNOSIS — I10 ESSENTIAL (PRIMARY) HYPERTENSION: ICD-10-CM

## 2025-03-24 DIAGNOSIS — D64.9 ANEMIA, UNSPECIFIED: ICD-10-CM

## 2025-03-24 PROCEDURE — 93000 ELECTROCARDIOGRAM COMPLETE: CPT

## 2025-03-24 PROCEDURE — G2211 COMPLEX E/M VISIT ADD ON: CPT

## 2025-03-24 PROCEDURE — 99214 OFFICE O/P EST MOD 30 MIN: CPT

## 2025-03-27 ENCOUNTER — EMERGENCY (EMERGENCY)
Facility: HOSPITAL | Age: 85
LOS: 1 days | Discharge: NURSING, REHAB OR LONG-TERM CARE | End: 2025-03-27
Attending: EMERGENCY MEDICINE | Admitting: EMERGENCY MEDICINE
Payer: MEDICARE

## 2025-03-27 VITALS
OXYGEN SATURATION: 98 % | TEMPERATURE: 98 F | DIASTOLIC BLOOD PRESSURE: 76 MMHG | HEIGHT: 66 IN | SYSTOLIC BLOOD PRESSURE: 145 MMHG | HEART RATE: 60 BPM | RESPIRATION RATE: 16 BRPM | WEIGHT: 154.1 LBS

## 2025-03-27 DIAGNOSIS — Z95.5 PRESENCE OF CORONARY ANGIOPLASTY IMPLANT AND GRAFT: Chronic | ICD-10-CM

## 2025-03-27 PROCEDURE — 99284 EMERGENCY DEPT VISIT MOD MDM: CPT | Mod: GC

## 2025-03-27 RX ORDER — AMOXICILLIN AND CLAVULANATE POTASSIUM 500; 125 MG/1; MG/1
1 TABLET, FILM COATED ORAL ONCE
Refills: 0 | Status: COMPLETED | OUTPATIENT
Start: 2025-03-27 | End: 2025-03-27

## 2025-03-27 RX ORDER — AMOXICILLIN AND CLAVULANATE POTASSIUM 500; 125 MG/1; MG/1
875 TABLET, FILM COATED ORAL
Qty: 14 | Refills: 0
Start: 2025-03-27 | End: 2025-04-02

## 2025-03-27 RX ADMIN — AMOXICILLIN AND CLAVULANATE POTASSIUM 1 TABLET(S): 500; 125 TABLET, FILM COATED ORAL at 19:50

## 2025-03-27 NOTE — ED PROVIDER NOTE - OBJECTIVE STATEMENT
84-year-old female past medical history of hypertension hyperlipidemia COPD, A-fib on Eliquis, CAD s/p stents, pacemaker placement, type 2 diabetes presents for evaluation of worsening pain and black discoloration of 4 teeth in her mouth.  Patient is concerned for infection.  Denies any swelling fevers chills.  Patient states that over 6 months ago 3 of the teeth had cracked off, pain has been worsening over the last week, with black discoloration forming at the bases of the teeth over the last 2 days.  Patient was evaluated by a dentist yesterday who recommends following up with an oral surgeon for further evaluation.  Patient has Medicare and has been having difficulty finding an oral surgeon that accepts her insurance.

## 2025-03-27 NOTE — ED PROVIDER NOTE - PROGRESS NOTE DETAILS
Julianne Salguero MD, PGY3:  Obtained clinic information from [facility] resident that potentially accepts patient's insurance.  Patient and son at bedside consent to plan for antibiotics and outpatient follow-up.  Patient has been holding her Eliquis per direction of her primary care physician in anticipation of undergoing oral surgery.  They agree with plan for outpatient follow-up with understanding of strict return precautions.  All questions answered.  Prescriptions for antibiotics and Peridex will be sent to pharmacy.  Patient ready for discharge.

## 2025-03-27 NOTE — ED PROVIDER NOTE - PHYSICAL EXAMINATION
General: WN/WD NAD  Head: Normocephalic Atraumatic  Eyes: EOM grossly in tact, no scleral icterus  ENT: teeth number 21, 22, 24 cracked with base of tooth in socket w/ black dislocation, tooth 23 intact with black discoloration at base of teeth, gums normal pink color without evidence of erythema, no swelling, no tenderness, no facial tenderness or swelling.   CV: Extremities warm and well perfused, heart regular rate and rhythm   Abdominal: Soft, non-distended  Respiratory: normal respiratory effort, lungs CTAB   Neuro: A&Ox3  Extremities: moves all extremities without difficulty, no visible deformities  Skin: No visible rashes

## 2025-03-27 NOTE — ED PROVIDER NOTE - NSFOLLOWUPINSTRUCTIONS_ED_ALL_ED_FT
[facility] OM[facility] clinic:  [address], 1st floor, [facility] phone number is [phone]    [facility] OM[facility] clinic [address] [place] [address] phone number: [phone]    You were seen in the emergency room for cracked and decaying teeth.     We sent antibiotics and Peridex to your pharmacy.  Please take this as directed.    Above are 2 oral maxillofacial clinics that you can follow-up with that may accept your insurance.  Please call to inquire if they accept your insurance and make an appointment.    Please return to the emergency room if you experience any new or worsening symptoms.  Please follow-up with your primary care physician this week.      -- Please use 1,000mg Tylenol (also called acetaminophen) every 6 hours You can get these without a prescription. Don't use more than 3500mg of Tylenol in any 24-hour period. Make sure your other prescription/over-the-counter medications don't contain any Tylenol so you don't take too much.

## 2025-03-27 NOTE — ED ADULT TRIAGE NOTE - CHIEF COMPLAINT QUOTE
Pt c/o dental pain and discoloration to upper left front tooth x 1 week. Pt saw dentist who recommended removal of 4 teeth but she cannot afford to pay for it. Denies fevers, chills, chest pain, SOB. PHx CAD s/p 3 stents, afib on Eliquis, HTN, and DM.

## 2025-03-27 NOTE — ED PROVIDER NOTE - ATTENDING CONTRIBUTION TO CARE
Attending note:   After face to face evaluation of this patient, I concur with above noted hx, pe, and care plan for this patient. Gibson: 84-year-old female with history of hypertension, hyperlipidemia, COPD, A-fib on Eliquis with statins.  Patient also has type 2 diabetes and pacemaker.  Patient presents ED with toothache.  Patient states she saw a dentist and was told she needed extraction for multiple tooth discolorations but patient was not able to do it and she needs an oral surgeon but she is not able to get one due to her insurance issues.  Patient denies any swelling.  There is some mild pain with chewing.  No difficulty swallowing.  Patient's vitals are unremarkable.  Patient has cracked teeth 2122 and 24 with some discoloration but no gum erythema or edema.  There is no fluctuance noted.  No trismus is noted.  Neck is soft supple full range of motion.  Will give antibiotics to prevent infection and recommend oral surgery clinic.  Patient is already holding her Eliquis at the advice of her dentist.

## 2025-03-27 NOTE — ED PROVIDER NOTE - PATIENT PORTAL LINK FT
You can access the FollowMyHealth Patient Portal offered by [facility] by registering at the following website: [email or web address]. By joining [employer]’s FollowMyHealth portal, you will also be able to view your health information using other applications (apps) compatible with our system.

## 2025-03-27 NOTE — ED PROVIDER NOTE - CLINICAL SUMMARY MEDICAL DECISION MAKING FREE TEXT BOX
84-year-old female presenting for evaluation of 3 cracked teeth that have discoloration at the stub of the tooth in the socket with 1 tooth with black discoloration at the base.  concern for tooth decay. At this time patient is without evidence of systemic infection, no evidence of infection on exam there is no erythema edema tenderness swelling.  Patient reports having difficulty obtaining   Oral surgery follow-up due to insurance issues.  Will give antibiotics, send Rx for antibiotics and Peridex to pharmacy, and arrange outpatient follow-up for patient.

## 2025-03-27 NOTE — ED ADULT NURSE NOTE - OBJECTIVE STATEMENT
Pt received to rm 3b   , awake and alert, A&OX4, ambulatory.  PHx CAD s/p 3 stents, afib on Eliquis, HTN, and DM.    C/o.  dental pain and discoloration to upper left front tooth for about a week. Pt saw dentist who recommended removal of 4 teeth but she cannot afford to pay for it. Denies fevers, chills, chest pain, SOB. Necrosis noted to top left teeth. Pt in no acute distress. Respirations even and unlabored. Denies CP, SOB, N/V, HA, dizziness, palpitations, blurry vision. Pt placed on CM per MD orders due to syncopal episode last time pt took Augmentin.  Pt is NSR on CM. Pt given meds per MD orders.   . Bed in lowest position, call bell within reach. Safety maintained. plan of care ongoing.

## 2025-04-07 ENCOUNTER — APPOINTMENT (OUTPATIENT)
Dept: INTERNAL MEDICINE | Facility: CLINIC | Age: 85
End: 2025-04-07

## 2025-04-10 ENCOUNTER — APPOINTMENT (OUTPATIENT)
Age: 85
End: 2025-04-10
Payer: SELF-PAY

## 2025-04-10 PROCEDURE — D0330 PANORAMIC RADIOGRAPHIC IMAGE: CPT

## 2025-04-10 PROCEDURE — D9310: CPT

## 2025-05-15 ENCOUNTER — APPOINTMENT (OUTPATIENT)
Age: 85
End: 2025-05-15
Payer: SELF-PAY

## 2025-05-15 PROCEDURE — D7210: CPT

## 2025-05-22 ENCOUNTER — APPOINTMENT (OUTPATIENT)
Age: 85
End: 2025-05-22

## 2025-05-29 ENCOUNTER — APPOINTMENT (OUTPATIENT)
Age: 85
End: 2025-05-29

## 2025-05-29 PROCEDURE — 99024 POSTOP FOLLOW-UP VISIT: CPT

## 2025-06-05 ENCOUNTER — APPOINTMENT (OUTPATIENT)
Age: 85
End: 2025-06-05

## 2025-06-16 NOTE — H&P CARDIOLOGY - PSYCHIATRIC DETAILS
Detail Level: Detailed
You will need to return to the office for two additional visits to be able to read the results appropriately: \\n- 48 hours after patches applied (1st read of results)\\n- 4-7 days after patches applied (2nd read of results)\\n\\nPlease be aware of the following for best results:\\n- You CANNOT get your back wet until you have completed the second read of the results. \\n- Avoid activities where you are twisting, which may loosen the patches.\\n- Avoid activities that induce sweating, as the sweat may displace the substances on the patches, rendering results misleading/indeterminate. \\n- If the tape begins to come off, please reinforce with micropore tape (this can be found at most pharmacies)
normal affect/normal behavior

## 2025-06-20 NOTE — ED ADULT NURSE NOTE - NS PRO AD NO ADVANCE DIRECTIVE
[facility] Hospitalist Progress Note    Admitting Date and Time: 6/13/2025  5:13 PM  Admit Dx: History of chronic pancreatitis [Z87.19]  Intractable abdominal pain [R10.9]  Chronic pancreatitis, unspecified pancreatitis type (HCC) [K86.1]  Severe acute pancreatitis [K85.90]    Subjective:  Patient is being followed for History of chronic pancreatitis [Z87.19]  Intractable abdominal pain [R10.9]  Chronic pancreatitis, unspecified pancreatitis type (HCC) [K86.1]  Severe acute pancreatitis [K85.90]   Pt feels okay  Per RN: no additional concerns    ROS: denies fever, chills, cp, sob, n/v, HA unless otherwise noted above    Meds:   insulin glargine  20 Units SubCUTAneous Nightly    sodium chloride flush  5-40 mL IntraVENous 2 times per day    enoxaparin  40 mg SubCUTAneous Daily    famotidine  20 mg Oral BID    sertraline  150 mg Oral Daily    lipase-protease-amylase  20,000 Units Oral TID WC    pantoprazole  40 mg Oral Lunch    insulin lispro  0-8 Units SubCUTAneous 4x Daily AC & HS     hydrALAZINE, 5 mg, Q6H PRN  prochlorperazine, 5 mg, Q6H PRN  sodium chloride flush, 5-40 mL, PRN  sodium chloride, , PRN  ondansetron, 4 mg, Q8H PRN   Or  ondansetron, 4 mg, Q6H PRN  polyethylene glycol, 17 g, Daily PRN  acetaminophen, 650 mg, Q6H PRN   Or  acetaminophen, 650 mg, Q6H PRN  glucose, 4 tablet, PRN  dextrose bolus, 125 mL, PRN   Or  dextrose bolus, 250 mL, PRN  glucagon (rDNA), 1 mg, PRN  dextrose, , Continuous PRN  HYDROmorphone, 2 mg, Q4H PRN       Objective:  /82   Pulse 89   Temp 97.9 °F (36.6 °C) (Oral)   Resp 16   Ht 1.803 m (5' 11\")   Wt 79.1 kg (174 lb 6.1 oz)   SpO2 98%   BMI 24.32 kg/m²   General Appearance: alert and oriented to person, place, time. NAD, laying in bed, uncomfortable appearing  Skin: warm and dry  Head: normocephalic and atraumatic  Eyes: PERRL, EOMI, conjunctivae normal  Neck: neck supple, trachea midline   Pulmonary/Chest: CTAB, no w/r/r, no respiratory distress,  No

## 2025-06-23 ENCOUNTER — APPOINTMENT (OUTPATIENT)
Dept: PULMONOLOGY | Facility: CLINIC | Age: 85
End: 2025-06-23
Payer: MEDICARE

## 2025-06-23 VITALS — DIASTOLIC BLOOD PRESSURE: 67 MMHG | HEART RATE: 67 BPM | SYSTOLIC BLOOD PRESSURE: 125 MMHG | OXYGEN SATURATION: 98 %

## 2025-06-23 LAB
ALBUMIN SERPL ELPH-MCNC: 4.4 G/DL
ALP BLD-CCNC: 83 U/L
ALT SERPL-CCNC: 28 U/L
ANION GAP SERPL CALC-SCNC: 16 MMOL/L
AST SERPL-CCNC: 24 U/L
BILIRUB SERPL-MCNC: 0.5 MG/DL
BUN SERPL-MCNC: 24 MG/DL
CALCIUM SERPL-MCNC: 10.3 MG/DL
CHLORIDE SERPL-SCNC: 102 MMOL/L
CO2 SERPL-SCNC: 19 MMOL/L
CREAT SERPL-MCNC: 1.32 MG/DL
EGFRCR SERPLBLD CKD-EPI 2021: 40 ML/MIN/1.73M2
GLUCOSE SERPL-MCNC: 184 MG/DL
NT-PROBNP SERPL-MCNC: 648 PG/ML
POCT - HEMOGLOBIN (HGB), QUANTITATIVE, TRANSCUTANEOUS: 9.6
POTASSIUM SERPL-SCNC: 5.2 MMOL/L
PROT SERPL-MCNC: 6.8 G/DL
SODIUM SERPL-SCNC: 138 MMOL/L

## 2025-06-23 PROCEDURE — ZZZZZ: CPT

## 2025-06-23 PROCEDURE — 94010 BREATHING CAPACITY TEST: CPT

## 2025-06-23 PROCEDURE — 94729 DIFFUSING CAPACITY: CPT

## 2025-06-23 PROCEDURE — 99213 OFFICE O/P EST LOW 20 MIN: CPT | Mod: 25

## 2025-06-23 PROCEDURE — 88738 HGB QUANT TRANSCUTANEOUS: CPT

## 2025-06-23 PROCEDURE — 36415 COLL VENOUS BLD VENIPUNCTURE: CPT

## 2025-06-23 PROCEDURE — 94727 GAS DIL/WSHOT DETER LNG VOL: CPT

## 2025-06-24 ENCOUNTER — APPOINTMENT (OUTPATIENT)
Dept: INTERNAL MEDICINE | Facility: CLINIC | Age: 85
End: 2025-06-24
Payer: MEDICARE

## 2025-06-24 ENCOUNTER — NON-APPOINTMENT (OUTPATIENT)
Age: 85
End: 2025-06-24

## 2025-06-24 VITALS
SYSTOLIC BLOOD PRESSURE: 130 MMHG | HEIGHT: 66 IN | WEIGHT: 158 LBS | HEART RATE: 60 BPM | BODY MASS INDEX: 25.39 KG/M2 | DIASTOLIC BLOOD PRESSURE: 68 MMHG | OXYGEN SATURATION: 98 % | TEMPERATURE: 97.7 F

## 2025-06-24 LAB
BASOPHILS # BLD AUTO: 0.07 K/UL
BASOPHILS NFR BLD AUTO: 1.3 %
EOSINOPHIL # BLD AUTO: 0.18 K/UL
EOSINOPHIL NFR BLD AUTO: 3.2 %
HCT VFR BLD CALC: 32.9 %
HGB BLD-MCNC: 10.2 G/DL
IMM GRANULOCYTES NFR BLD AUTO: 0.4 %
LYMPHOCYTES # BLD AUTO: 1.92 K/UL
LYMPHOCYTES NFR BLD AUTO: 34.5 %
MAN DIFF?: NORMAL
MCHC RBC-ENTMCNC: 24.7 PG
MCHC RBC-ENTMCNC: 31 G/DL
MCV RBC AUTO: 79.7 FL
MONOCYTES # BLD AUTO: 0.6 K/UL
MONOCYTES NFR BLD AUTO: 10.8 %
NEUTROPHILS # BLD AUTO: 2.78 K/UL
NEUTROPHILS NFR BLD AUTO: 49.8 %
PLATELET # BLD AUTO: 248 K/UL
RBC # BLD: 4.13 M/UL
RBC # FLD: 17.5 %
WBC # FLD AUTO: 5.57 K/UL

## 2025-06-24 PROCEDURE — G2211 COMPLEX E/M VISIT ADD ON: CPT

## 2025-06-24 PROCEDURE — 93000 ELECTROCARDIOGRAM COMPLETE: CPT

## 2025-06-24 PROCEDURE — 99214 OFFICE O/P EST MOD 30 MIN: CPT

## 2025-06-25 LAB
APPEARANCE: CLEAR
BACTERIA: NEGATIVE /HPF
BILIRUBIN URINE: NEGATIVE
BLOOD URINE: NEGATIVE
CAST: 2 /LPF
COLOR: YELLOW
EPITHELIAL CELLS: 5 /HPF
GLUCOSE QUALITATIVE U: 250 MG/DL
KETONES URINE: ABNORMAL MG/DL
LEUKOCYTE ESTERASE URINE: ABNORMAL
MICROSCOPIC-UA: NORMAL
NITRITE URINE: NEGATIVE
PH URINE: 7
PROTEIN URINE: 30 MG/DL
RED BLOOD CELLS URINE: 3 /HPF
SPECIFIC GRAVITY URINE: 1.02
UROBILINOGEN URINE: 1 MG/DL
WHITE BLOOD CELLS URINE: 1 /HPF

## 2025-06-26 ENCOUNTER — APPOINTMENT (OUTPATIENT)
Dept: ULTRASOUND IMAGING | Facility: IMAGING CENTER | Age: 85
End: 2025-06-26

## 2025-06-26 ENCOUNTER — OUTPATIENT (OUTPATIENT)
Dept: OUTPATIENT SERVICES | Facility: HOSPITAL | Age: 85
LOS: 1 days | End: 2025-06-26
Payer: MEDICARE

## 2025-06-26 DIAGNOSIS — Z95.5 PRESENCE OF CORONARY ANGIOPLASTY IMPLANT AND GRAFT: Chronic | ICD-10-CM

## 2025-06-26 DIAGNOSIS — R60.0 LOCALIZED EDEMA: ICD-10-CM

## 2025-06-26 PROCEDURE — 93970 EXTREMITY STUDY: CPT

## 2025-06-26 PROCEDURE — 93970 EXTREMITY STUDY: CPT | Mod: 26

## 2025-06-27 LAB — BACTERIA UR CULT: ABNORMAL

## 2025-06-30 ENCOUNTER — APPOINTMENT (OUTPATIENT)
Dept: INTERNAL MEDICINE | Facility: CLINIC | Age: 85
End: 2025-06-30

## 2025-06-30 VITALS — DIASTOLIC BLOOD PRESSURE: 60 MMHG | SYSTOLIC BLOOD PRESSURE: 100 MMHG

## 2025-06-30 VITALS
HEART RATE: 60 BPM | BODY MASS INDEX: 24.59 KG/M2 | OXYGEN SATURATION: 96 % | SYSTOLIC BLOOD PRESSURE: 110 MMHG | HEIGHT: 66 IN | WEIGHT: 153 LBS | DIASTOLIC BLOOD PRESSURE: 60 MMHG

## 2025-06-30 PROBLEM — S90.01XA: Status: ACTIVE | Noted: 2025-06-30

## 2025-06-30 PROCEDURE — 93000 ELECTROCARDIOGRAM COMPLETE: CPT

## 2025-06-30 PROCEDURE — G2211 COMPLEX E/M VISIT ADD ON: CPT

## 2025-06-30 PROCEDURE — 99214 OFFICE O/P EST MOD 30 MIN: CPT

## 2025-06-30 RX ORDER — AMOXICILLIN AND CLAVULANATE POTASSIUM 875; 125 MG/1; MG/1
875-125 TABLET, COATED ORAL TWICE DAILY
Qty: 10 | Refills: 0 | Status: DISCONTINUED | COMMUNITY
Start: 2025-06-26 | End: 2025-06-30

## 2025-06-30 RX ORDER — VALSARTAN AND HYDROCHLOROTHIAZIDE 320; 12.5 MG/1; MG/1
320-12.5 TABLET, FILM COATED ORAL DAILY
Qty: 90 | Refills: 1 | Status: DISCONTINUED | COMMUNITY
Start: 2025-06-24 | End: 2025-06-30

## 2025-07-03 LAB
ANION GAP SERPL CALC-SCNC: 18 MMOL/L
BASOPHILS # BLD AUTO: 0.06 K/UL
BASOPHILS NFR BLD AUTO: 1 %
BUN SERPL-MCNC: 25 MG/DL
CALCIUM SERPL-MCNC: 10 MG/DL
CHLORIDE SERPL-SCNC: 96 MMOL/L
CHOLEST SERPL-MCNC: 208 MG/DL
CK SERPL-CCNC: 84 U/L
CO2 SERPL-SCNC: 19 MMOL/L
CREAT SERPL-MCNC: 1.61 MG/DL
EGFRCR SERPLBLD CKD-EPI 2021: 32 ML/MIN/1.73M2
EOSINOPHIL # BLD AUTO: 0.14 K/UL
EOSINOPHIL NFR BLD AUTO: 2.4 %
ESTIMATED AVERAGE GLUCOSE: 217 MG/DL
GLUCOSE SERPL-MCNC: 384 MG/DL
HBA1C MFR BLD HPLC: 9.2 %
HCT VFR BLD CALC: 34.7 %
HDLC SERPL-MCNC: 89 MG/DL
HGB BLD-MCNC: 10.5 G/DL
IMM GRANULOCYTES NFR BLD AUTO: 0.3 %
LDLC SERPL-MCNC: 99 MG/DL
LYMPHOCYTES # BLD AUTO: 1.67 K/UL
LYMPHOCYTES NFR BLD AUTO: 28.5 %
MAN DIFF?: NORMAL
MCHC RBC-ENTMCNC: 24.6 PG
MCHC RBC-ENTMCNC: 30.3 G/DL
MCV RBC AUTO: 81.3 FL
MONOCYTES # BLD AUTO: 0.6 K/UL
MONOCYTES NFR BLD AUTO: 10.3 %
NEUTROPHILS # BLD AUTO: 3.36 K/UL
NEUTROPHILS NFR BLD AUTO: 57.5 %
NONHDLC SERPL-MCNC: 119 MG/DL
NT-PROBNP SERPL-MCNC: 510 PG/ML
PLATELET # BLD AUTO: 271 K/UL
POTASSIUM SERPL-SCNC: 4.7 MMOL/L
RBC # BLD: 4.27 M/UL
RBC # FLD: 17.5 %
SODIUM SERPL-SCNC: 134 MMOL/L
T4 FREE SERPL-MCNC: 1.8 NG/DL
TRIGL SERPL-MCNC: 118 MG/DL
TSH SERPL-ACNC: 1.45 UIU/ML
WBC # FLD AUTO: 5.85 K/UL

## 2025-07-07 ENCOUNTER — APPOINTMENT (OUTPATIENT)
Dept: CARDIOLOGY | Facility: CLINIC | Age: 85
End: 2025-07-07

## 2025-07-08 ENCOUNTER — APPOINTMENT (OUTPATIENT)
Dept: CARDIOLOGY | Facility: CLINIC | Age: 85
End: 2025-07-08
Payer: MEDICARE

## 2025-07-08 VITALS
TEMPERATURE: 97.4 F | BODY MASS INDEX: 25.07 KG/M2 | DIASTOLIC BLOOD PRESSURE: 60 MMHG | OXYGEN SATURATION: 95 % | SYSTOLIC BLOOD PRESSURE: 132 MMHG | WEIGHT: 156 LBS | HEIGHT: 66 IN | HEART RATE: 64 BPM

## 2025-07-08 PROCEDURE — 93000 ELECTROCARDIOGRAM COMPLETE: CPT | Mod: PD

## 2025-07-08 PROCEDURE — 99214 OFFICE O/P EST MOD 30 MIN: CPT

## 2025-07-09 ENCOUNTER — OUTPATIENT (OUTPATIENT)
Dept: OUTPATIENT SERVICES | Facility: HOSPITAL | Age: 85
LOS: 1 days | End: 2025-07-09
Payer: MEDICARE

## 2025-07-09 VITALS
DIASTOLIC BLOOD PRESSURE: 72 MMHG | OXYGEN SATURATION: 98 % | HEART RATE: 60 BPM | HEIGHT: 66 IN | RESPIRATION RATE: 16 BRPM | WEIGHT: 154.1 LBS | SYSTOLIC BLOOD PRESSURE: 160 MMHG | TEMPERATURE: 98 F

## 2025-07-09 DIAGNOSIS — Z95.5 PRESENCE OF CORONARY ANGIOPLASTY IMPLANT AND GRAFT: Chronic | ICD-10-CM

## 2025-07-09 DIAGNOSIS — I25.10 ATHEROSCLEROTIC HEART DISEASE OF NATIVE CORONARY ARTERY WITHOUT ANGINA PECTORIS: ICD-10-CM

## 2025-07-09 LAB
ANION GAP SERPL CALC-SCNC: 16 MMOL/L — SIGNIFICANT CHANGE UP (ref 5–17)
BUN SERPL-MCNC: 26 MG/DL — HIGH (ref 7–23)
CALCIUM SERPL-MCNC: 9.9 MG/DL — SIGNIFICANT CHANGE UP (ref 8.4–10.5)
CHLORIDE SERPL-SCNC: 100 MMOL/L — SIGNIFICANT CHANGE UP (ref 96–108)
CO2 SERPL-SCNC: 20 MMOL/L — LOW (ref 22–31)
CREAT SERPL-MCNC: 1.35 MG/DL — HIGH (ref 0.5–1.3)
EGFR: 39 ML/MIN/1.73M2 — LOW
EGFR: 39 ML/MIN/1.73M2 — LOW
GLUCOSE SERPL-MCNC: 218 MG/DL — HIGH (ref 70–99)
HCT VFR BLD CALC: 32 % — LOW (ref 34.5–45)
HGB BLD-MCNC: 10.1 G/DL — LOW (ref 11.5–15.5)
MCHC RBC-ENTMCNC: 25 PG — LOW (ref 27–34)
MCHC RBC-ENTMCNC: 31.6 G/DL — LOW (ref 32–36)
MCV RBC AUTO: 79.2 FL — LOW (ref 80–100)
NRBC # BLD AUTO: 0 K/UL — SIGNIFICANT CHANGE UP (ref 0–0)
NRBC # FLD: 0 K/UL — SIGNIFICANT CHANGE UP (ref 0–0)
NRBC BLD AUTO-RTO: 0 /100 WBCS — SIGNIFICANT CHANGE UP (ref 0–0)
PLATELET # BLD AUTO: 207 K/UL — SIGNIFICANT CHANGE UP (ref 150–400)
PMV BLD: 11.1 FL — SIGNIFICANT CHANGE UP (ref 7–13)
POTASSIUM SERPL-MCNC: 4.2 MMOL/L — SIGNIFICANT CHANGE UP (ref 3.5–5.3)
POTASSIUM SERPL-SCNC: 4.2 MMOL/L — SIGNIFICANT CHANGE UP (ref 3.5–5.3)
RBC # BLD: 4.04 M/UL — SIGNIFICANT CHANGE UP (ref 3.8–5.2)
RBC # FLD: 16.8 % — HIGH (ref 10.3–14.5)
SODIUM SERPL-SCNC: 136 MMOL/L — SIGNIFICANT CHANGE UP (ref 135–145)
WBC # BLD: 5.72 K/UL — SIGNIFICANT CHANGE UP (ref 3.8–10.5)
WBC # FLD AUTO: 5.72 K/UL — SIGNIFICANT CHANGE UP (ref 3.8–10.5)

## 2025-07-09 PROCEDURE — 93010 ELECTROCARDIOGRAM REPORT: CPT

## 2025-07-09 PROCEDURE — 80048 BASIC METABOLIC PNL TOTAL CA: CPT

## 2025-07-09 PROCEDURE — 85027 COMPLETE CBC AUTOMATED: CPT

## 2025-07-09 RX ORDER — INSULIN DEGLUDEC 100 U/ML
15 INJECTION, SOLUTION SUBCUTANEOUS
Refills: 0 | DISCHARGE

## 2025-07-09 RX ORDER — GLIMEPIRIDE 4 MG/1
1 TABLET ORAL
Refills: 0 | DISCHARGE

## 2025-07-09 RX ORDER — FLUTICASONE FUROATE, UMECLIDINIUM BROMIDE AND VILANTEROL TRIFENATATE 100; 62.5; 25 UG/1; UG/1; UG/1
1 POWDER RESPIRATORY (INHALATION)
Refills: 0 | DISCHARGE

## 2025-07-09 RX ORDER — AMLODIPINE BESYLATE 10 MG/1
1 TABLET ORAL
Refills: 0 | DISCHARGE

## 2025-07-09 NOTE — H&P CARDIOLOGY - HISTORY OF PRESENT ILLNESS
84 year F w/MHx of TIA, CAD s/p PCI, Afib, PPM, COPD/Emphysema, Renal atrophy, HLD, HTN, T2DM, Anemia who presents for follow up. Recently seen by Dr. Bravo due to LE edema - venous doppler negative for DVT. Amlodipine decreased to 5 mg daily. She was also being treated for UTI recently due to urine culture with [id number] enterococcus faecalis with Augmentin and is currently still taking antibiotics. She reports swelling in ankles R>L, which worsens with walking. She reports of QUEZADA with mild exertion, intermittently dizziness and leg weakness with walking. She reports of sharp pain in chest at times which resolves on own. Referred for [facility] today due to persistence of symptoms.   Of note, she is pending tooth extraction this Thursday.  < from: Cardiac Catheterization (07.21.23 @ 16:16) >  Cath Lab Report    Diagnostic Cardiologist:       Domingo Mohan MD   Referring Physician:           Arnol Garrett MD     Procedures Performed   Procedures:               1.    Arterial Access - Right Radial     2.    Diagnostic Coronary Angiography     Indications:Unstable angina     Diagnostic Conclusions:   Coronary angiography demonstrated minor luminal irregularities   patent stents to the lad and lcx     Procedure Narrative:   The risks and alternatives of the procedures and conscious sedation  were explained to the patient and informed consent was  obtained. The patient was brought to the cath lab and placed on the  exam table.  Access   Right radial artery:   The puncture site was infiltrated with 1% Lidocaine. Vascular access  was obtained using modified seldinger technique.    Diagnostic Findings:     Coronary Angiography   LM   Left main artery: Angiography shows mild atherosclerosis.      LAD   Left anterior descending artery: Angiography shows mild  atherosclerosis. patent stent.    CX   Circumflex: Angiography shows mild atherosclerosis. patent stent.      RCA   Right coronary artery: Angiography shows mild atherosclerosis.        < end of copied text >   84 year F, [employer], with PMH of TIA, CAD s/p PCI, Afib on Eliquis (last dose 7/7 evening), PPM, COPD/Emphysema, Renal atrophy, HLD, HTN, T2DM, Anemia who presents for LHC. Recently seen by Dr. Bravo due to LE edema - venous doppler negative for DVT. Amlodipine decreased to 5 mg daily. She reports of QUEZADA with mild exertion (<1/2 block onset), intermittently dizziness and leg weakness with walking. She reports of sharp pain in chest at times which resolves on own. Referred for C today due to persistence of symptoms.   Of note, she is pending tooth extraction this Thursday. She is also currently being treated for a UTI on Augmentin but is unsure how many doses she has remaining.  Referring: Dr. Garrett   < from: Cardiac Catheterization (07.21.23 @ 16:16) >  Cath Lab Report    Diagnostic Cardiologist:       Domingo Mohan MD   Referring Physician:           Arnol Garrett MD     Procedures Performed   Procedures:               1.    Arterial Access - Right Radial     2.    Diagnostic Coronary Angiography     Indications:Unstable angina     Diagnostic Conclusions:   Coronary angiography demonstrated minor luminal irregularities   patent stents to the lad and lcx     Procedure Narrative:   The risks and alternatives of the procedures and conscious sedation  were explained to the patient and informed consent was  obtained. The patient was brought to the cath lab and placed on the  exam table.  Access   Right radial artery:   The puncture site was infiltrated with 1% Lidocaine. Vascular access  was obtained using modified seldinger technique.    Diagnostic Findings:     Coronary Angiography   LM   Left main artery: Angiography shows mild atherosclerosis.      LAD   Left anterior descending artery: Angiography shows mild  atherosclerosis. patent stent.    CX   Circumflex: Angiography shows mild atherosclerosis. patent stent.      RCA   Right coronary artery: Angiography shows mild atherosclerosis.        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 Recently seen by Dr. Bravo due to LE edema - venous doppler negative for DVT. Amlodipine decreased to 5 mg daily. She reports of QUEZADA with mild exertion (<1/2 block onset), intermittently dizziness and leg weakness with walking. She reports of sharp pain in chest at times which resolves on own. Referred for C today due to persistence of symptoms.   Of note, she is currently being treated for a UTI on Augmentin but is unsure how many doses she has remaining.  Referring: Dr. Garrett   < from: Cardiac Catheterization (07.21.23 @ 16:16) >  Cath Lab Report    Diagnostic Cardiologist:       Domingo Mohan MD   Referring Physician:           Arnol Garrett MD     Procedures Performed   Procedures:               1.    Arterial Access - Right Radial     2.    Diagnostic Coronary Angiography     Indications:Unstable angina     Diagnostic Conclusions:   Coronary angiography demonstrated minor luminal irregularities   patent stents to the lad and lcx     Procedure Narrative:   The risks and alternatives of the procedures and conscious sedation  were explained to the patient and informed consent was  obtained. The patient was brought to the cath lab and placed on the  exam table.  Access   Right radial artery:   The puncture site was infiltrated with 1% Lidocaine. Vascular access  was obtained using modified seldinger technique.    Diagnostic Findings:     Coronary Angiography   LM   Left main artery: Angiography shows mild atherosclerosis.      LAD   Left anterior descending artery: Angiography shows mild  atherosclerosis. patent stent.    CX   Circumflex: Angiography shows mild atherosclerosis. patent stent.      RCA   Right coronary artery: Angiography shows mild atherosclerosis.        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 Recently seen by Dr. Bravo due to LE edema - venous doppler negative for DVT. Amlodipine decreased to 5 mg daily. She reports of QUEZADA with mild exertion (<1/2 block onset), intermittently dizziness and leg weakness with walking. She reports of sharp pain in chest at times which resolves on own. Referred for C today due to persistence of symptoms.   Of note, she is currently being treated for a UTI on Augmentin but is unsure how many doses she has remaining.  Referring: Dr. Garrett   < from: Cardiac Catheterization (07.21.23 @ 16:16) >  Cath Lab Report    Diagnostic Cardiologist:       Domingo Mohan MD   Referring Physician:           Arnol Garrett MD     Procedures Performed   Procedures:               1.    Arterial Access - Right Radial     2.    Diagnostic Coronary Angiography     Indications:Unstable angina     Diagnostic Conclusions:   Coronary angiography demonstrated minor luminal irregularities   patent stents to the lad and lcx     Procedure Narrative:   The risks and alternatives of the procedures and conscious sedation  were explained to the patient and informed consent was  obtained. The patient was brought to the cath lab and placed on the  exam table.  Access   Right radial artery:   The puncture site was infiltrated with 1% Lidocaine. Vascular access  was obtained using modified seldinger technique.    Diagnostic Findings:     Coronary Angiography   LM   Left main artery: Angiography shows mild atherosclerosis.      LAD   Left anterior descending artery: Angiography shows mild  atherosclerosis. patent stent.    CX   Circumflex: Angiography shows mild atherosclerosis. patent stent.      RCA   Right coronary artery: Angiography shows mild atherosclerosis.        < end of copied text >    After speaking with son pt has decided she would like to delay procedure at this time. She spoke with Dr. Forrester prior to leaving.

## 2025-07-09 NOTE — H&P CARDIOLOGY - NSICDXPASTMEDICALHX_GEN_ALL_CORE_FT
PAST MEDICAL HISTORY:  Anemia     Atrial Fibrillation     Bradycardia     CAD (coronary artery disease)     DM (Diabetes Mellitus)     History of COPD     HTN (Hypertension)     Hypercholesteremia     Kidney stones

## 2025-07-09 NOTE — H&P CARDIOLOGY - GASTROINTESTINAL
Action 4: Continue Detail Level: Zone Continue Regimen: Tremfya 100 mg 1 injection every 8 weeks detailed exam

## 2025-07-10 ENCOUNTER — APPOINTMENT (OUTPATIENT)
Age: 85
End: 2025-07-10

## 2025-07-10 ENCOUNTER — APPOINTMENT (OUTPATIENT)
Dept: CARDIOLOGY | Facility: CLINIC | Age: 85
End: 2025-07-10
Payer: MEDICARE

## 2025-07-10 VITALS
TEMPERATURE: 97.8 F | OXYGEN SATURATION: 99 % | SYSTOLIC BLOOD PRESSURE: 160 MMHG | DIASTOLIC BLOOD PRESSURE: 80 MMHG | WEIGHT: 156 LBS | HEART RATE: 60 BPM | BODY MASS INDEX: 25.07 KG/M2 | HEIGHT: 66 IN

## 2025-07-10 LAB
ANION GAP SERPL CALC-SCNC: 18 MMOL/L
BASOPHILS # BLD AUTO: 0.07 K/UL
BASOPHILS NFR BLD AUTO: 1.1 %
BUN SERPL-MCNC: 25 MG/DL
CALCIUM SERPL-MCNC: 10.2 MG/DL
CHLORIDE SERPL-SCNC: 100 MMOL/L
CO2 SERPL-SCNC: 17 MMOL/L
CREAT SERPL-MCNC: 1.33 MG/DL
EGFRCR SERPLBLD CKD-EPI 2021: 40 ML/MIN/1.73M2
EOSINOPHIL # BLD AUTO: 0.1 K/UL
EOSINOPHIL NFR BLD AUTO: 1.6 %
GLUCOSE SERPL-MCNC: 171 MG/DL
HCT VFR BLD CALC: 34.2 %
HGB BLD-MCNC: 10.6 G/DL
IMM GRANULOCYTES NFR BLD AUTO: 0.3 %
LYMPHOCYTES # BLD AUTO: 1.89 K/UL
LYMPHOCYTES NFR BLD AUTO: 30.2 %
MAN DIFF?: NORMAL
MCHC RBC-ENTMCNC: 24.9 PG
MCHC RBC-ENTMCNC: 31 G/DL
MCV RBC AUTO: 80.5 FL
MONOCYTES # BLD AUTO: 0.8 K/UL
MONOCYTES NFR BLD AUTO: 12.8 %
NEUTROPHILS # BLD AUTO: 3.37 K/UL
NEUTROPHILS NFR BLD AUTO: 54 %
NT-PROBNP SERPL-MCNC: 754 PG/ML
PLATELET # BLD AUTO: 226 K/UL
POTASSIUM SERPL-SCNC: 5 MMOL/L
RBC # BLD: 4.25 M/UL
RBC # FLD: 17.6 %
SODIUM SERPL-SCNC: 136 MMOL/L
WBC # FLD AUTO: 6.25 K/UL

## 2025-07-10 PROCEDURE — G2211 COMPLEX E/M VISIT ADD ON: CPT | Mod: PD

## 2025-07-10 PROCEDURE — 99213 OFFICE O/P EST LOW 20 MIN: CPT

## 2025-07-11 ENCOUNTER — OUTPATIENT (OUTPATIENT)
Dept: INPATIENT UNIT | Facility: HOSPITAL | Age: 85
LOS: 1 days | End: 2025-07-11
Payer: MEDICARE

## 2025-07-11 ENCOUNTER — TRANSCRIPTION ENCOUNTER (OUTPATIENT)
Age: 85
End: 2025-07-11

## 2025-07-11 VITALS — WEIGHT: 156.09 LBS | HEIGHT: 66 IN

## 2025-07-11 DIAGNOSIS — Z95.5 PRESENCE OF CORONARY ANGIOPLASTY IMPLANT AND GRAFT: Chronic | ICD-10-CM

## 2025-07-11 DIAGNOSIS — I25.10 ATHEROSCLEROTIC HEART DISEASE OF NATIVE CORONARY ARTERY WITHOUT ANGINA PECTORIS: ICD-10-CM

## 2025-07-11 PROBLEM — D64.9 ANEMIA, UNSPECIFIED: Chronic | Status: ACTIVE | Noted: 2025-07-09

## 2025-07-11 LAB
ANION GAP SERPL CALC-SCNC: 12 MMOL/L — SIGNIFICANT CHANGE UP (ref 5–17)
BUN SERPL-MCNC: 22 MG/DL — SIGNIFICANT CHANGE UP (ref 7–23)
CALCIUM SERPL-MCNC: 8.8 MG/DL — SIGNIFICANT CHANGE UP (ref 8.4–10.5)
CHLORIDE SERPL-SCNC: 104 MMOL/L — SIGNIFICANT CHANGE UP (ref 96–108)
CO2 SERPL-SCNC: 19 MMOL/L — LOW (ref 22–31)
CREAT SERPL-MCNC: 1.14 MG/DL — SIGNIFICANT CHANGE UP (ref 0.5–1.3)
EGFR: 48 ML/MIN/1.73M2 — LOW
EGFR: 48 ML/MIN/1.73M2 — LOW
GLUCOSE BLDC GLUCOMTR-MCNC: 119 MG/DL — HIGH (ref 70–99)
GLUCOSE BLDC GLUCOMTR-MCNC: 147 MG/DL — HIGH (ref 70–99)
GLUCOSE BLDC GLUCOMTR-MCNC: 262 MG/DL — HIGH (ref 70–99)
GLUCOSE SERPL-MCNC: 297 MG/DL — HIGH (ref 70–99)
HCT VFR BLD CALC: 28.1 % — LOW (ref 34.5–45)
HGB BLD-MCNC: 8.9 G/DL — LOW (ref 11.5–15.5)
MAGNESIUM SERPL-MCNC: 1.8 MG/DL — SIGNIFICANT CHANGE UP (ref 1.6–2.6)
MCHC RBC-ENTMCNC: 24.9 PG — LOW (ref 27–34)
MCHC RBC-ENTMCNC: 31.7 G/DL — LOW (ref 32–36)
MCV RBC AUTO: 78.5 FL — LOW (ref 80–100)
NRBC # BLD AUTO: 0 K/UL — SIGNIFICANT CHANGE UP (ref 0–0)
NRBC # FLD: 0 K/UL — SIGNIFICANT CHANGE UP (ref 0–0)
NRBC BLD AUTO-RTO: 0 /100 WBCS — SIGNIFICANT CHANGE UP (ref 0–0)
PLATELET # BLD AUTO: 178 K/UL — SIGNIFICANT CHANGE UP (ref 150–400)
PMV BLD: 11.4 FL — SIGNIFICANT CHANGE UP (ref 7–13)
POTASSIUM SERPL-MCNC: 4.5 MMOL/L — SIGNIFICANT CHANGE UP (ref 3.5–5.3)
POTASSIUM SERPL-SCNC: 4.5 MMOL/L — SIGNIFICANT CHANGE UP (ref 3.5–5.3)
RBC # BLD: 3.58 M/UL — LOW (ref 3.8–5.2)
RBC # FLD: 16.8 % — HIGH (ref 10.3–14.5)
SODIUM SERPL-SCNC: 135 MMOL/L — SIGNIFICANT CHANGE UP (ref 135–145)
WBC # BLD: 9.41 K/UL — SIGNIFICANT CHANGE UP (ref 3.8–10.5)
WBC # FLD AUTO: 9.41 K/UL — SIGNIFICANT CHANGE UP (ref 3.8–10.5)

## 2025-07-11 PROCEDURE — C9600: CPT | Mod: LD

## 2025-07-11 PROCEDURE — 93454 CORONARY ARTERY ANGIO S&I: CPT | Mod: 59

## 2025-07-11 PROCEDURE — C1887: CPT

## 2025-07-11 PROCEDURE — 93005 ELECTROCARDIOGRAM TRACING: CPT

## 2025-07-11 PROCEDURE — C1769: CPT

## 2025-07-11 PROCEDURE — 83735 ASSAY OF MAGNESIUM: CPT

## 2025-07-11 PROCEDURE — 93926 LOWER EXTREMITY STUDY: CPT | Mod: 26,RT

## 2025-07-11 PROCEDURE — C1874: CPT

## 2025-07-11 PROCEDURE — C1760: CPT

## 2025-07-11 PROCEDURE — 83036 HEMOGLOBIN GLYCOSYLATED A1C: CPT

## 2025-07-11 PROCEDURE — 82962 GLUCOSE BLOOD TEST: CPT

## 2025-07-11 PROCEDURE — C1894: CPT

## 2025-07-11 PROCEDURE — 80048 BASIC METABOLIC PNL TOTAL CA: CPT

## 2025-07-11 PROCEDURE — 85027 COMPLETE CBC AUTOMATED: CPT

## 2025-07-11 PROCEDURE — 93926 LOWER EXTREMITY STUDY: CPT

## 2025-07-11 RX ORDER — INSULIN LISPRO 100 U/ML
INJECTION, SOLUTION INTRAVENOUS; SUBCUTANEOUS
Refills: 0 | Status: DISCONTINUED | OUTPATIENT
Start: 2025-07-11 | End: 2025-07-12

## 2025-07-11 RX ORDER — CLOPIDOGREL BISULFATE 75 MG/1
1 TABLET, FILM COATED ORAL
Qty: 90 | Refills: 3
Start: 2025-07-11 | End: 2026-07-05

## 2025-07-11 RX ORDER — AMLODIPINE BESYLATE 10 MG/1
5 TABLET ORAL DAILY
Refills: 0 | Status: DISCONTINUED | OUTPATIENT
Start: 2025-07-11 | End: 2025-07-12

## 2025-07-11 RX ORDER — METOPROLOL SUCCINATE 50 MG/1
100 TABLET, EXTENDED RELEASE ORAL DAILY
Refills: 0 | Status: DISCONTINUED | OUTPATIENT
Start: 2025-07-11 | End: 2025-07-12

## 2025-07-11 RX ORDER — DEXTROSE 50 % IN WATER 50 %
12.5 SYRINGE (ML) INTRAVENOUS ONCE
Refills: 0 | Status: DISCONTINUED | OUTPATIENT
Start: 2025-07-11 | End: 2025-07-12

## 2025-07-11 RX ORDER — AMIODARONE HYDROCHLORIDE 50 MG/ML
200 INJECTION, SOLUTION INTRAVENOUS DAILY
Refills: 0 | Status: DISCONTINUED | OUTPATIENT
Start: 2025-07-11 | End: 2025-07-12

## 2025-07-11 RX ORDER — CLOPIDOGREL BISULFATE 75 MG/1
75 TABLET, FILM COATED ORAL DAILY
Refills: 0 | Status: DISCONTINUED | OUTPATIENT
Start: 2025-07-11 | End: 2025-07-12

## 2025-07-11 RX ORDER — ATORVASTATIN CALCIUM 80 MG/1
40 TABLET, FILM COATED ORAL AT BEDTIME
Refills: 0 | Status: DISCONTINUED | OUTPATIENT
Start: 2025-07-11 | End: 2025-07-12

## 2025-07-11 RX ORDER — GLUCAGON 3 MG/1
1 POWDER NASAL ONCE
Refills: 0 | Status: DISCONTINUED | OUTPATIENT
Start: 2025-07-11 | End: 2025-07-12

## 2025-07-11 RX ORDER — SODIUM CHLORIDE 9 G/1000ML
1000 INJECTION, SOLUTION INTRAVENOUS
Refills: 0 | Status: DISCONTINUED | OUTPATIENT
Start: 2025-07-11 | End: 2025-07-12

## 2025-07-11 RX ORDER — DEXTROSE 50 % IN WATER 50 %
15 SYRINGE (ML) INTRAVENOUS ONCE
Refills: 0 | Status: DISCONTINUED | OUTPATIENT
Start: 2025-07-11 | End: 2025-07-12

## 2025-07-11 RX ORDER — AMIODARONE HYDROCHLORIDE 50 MG/ML
INJECTION, SOLUTION INTRAVENOUS DAILY
Refills: 0 | Status: DISCONTINUED | OUTPATIENT
Start: 2025-07-11 | End: 2025-07-11

## 2025-07-11 RX ORDER — FENTANYL CITRATE-0.9 % NACL/PF 100MCG/2ML
25 SYRINGE (ML) INTRAVENOUS ONCE
Refills: 0 | Status: DISCONTINUED | OUTPATIENT
Start: 2025-07-11 | End: 2025-07-11

## 2025-07-11 RX ORDER — DEXTROSE 50 % IN WATER 50 %
25 SYRINGE (ML) INTRAVENOUS ONCE
Refills: 0 | Status: DISCONTINUED | OUTPATIENT
Start: 2025-07-11 | End: 2025-07-12

## 2025-07-11 RX ORDER — AMOXICILLIN AND CLAVULANATE POTASSIUM 500; 125 MG/1; MG/1
1 TABLET, FILM COATED ORAL
Refills: 0 | Status: DISCONTINUED | OUTPATIENT
Start: 2025-07-11 | End: 2025-07-12

## 2025-07-11 RX ORDER — ASPIRIN 325 MG
81 TABLET ORAL DAILY
Refills: 0 | Status: DISCONTINUED | OUTPATIENT
Start: 2025-07-11 | End: 2025-07-11

## 2025-07-11 RX ORDER — INSULIN GLARGINE-YFGN 100 [IU]/ML
9 INJECTION, SOLUTION SUBCUTANEOUS AT BEDTIME
Refills: 0 | Status: DISCONTINUED | OUTPATIENT
Start: 2025-07-11 | End: 2025-07-12

## 2025-07-11 RX ORDER — METOPROLOL SUCCINATE 50 MG/1
50 TABLET, EXTENDED RELEASE ORAL AT BEDTIME
Refills: 0 | Status: DISCONTINUED | OUTPATIENT
Start: 2025-07-11 | End: 2025-07-12

## 2025-07-11 RX ORDER — ACETAMINOPHEN 500 MG/5ML
650 LIQUID (ML) ORAL ONCE
Refills: 0 | Status: COMPLETED | OUTPATIENT
Start: 2025-07-11 | End: 2025-07-11

## 2025-07-11 RX ORDER — AMIODARONE HYDROCHLORIDE 50 MG/ML
400 INJECTION, SOLUTION INTRAVENOUS EVERY 8 HOURS
Refills: 0 | Status: DISCONTINUED | OUTPATIENT
Start: 2025-07-11 | End: 2025-07-11

## 2025-07-11 RX ORDER — APIXABAN 5 MG/1
5 TABLET, FILM COATED ORAL
Refills: 0 | Status: DISCONTINUED | OUTPATIENT
Start: 2025-07-11 | End: 2025-07-11

## 2025-07-11 RX ORDER — ASPIRIN 325 MG
81 TABLET ORAL DAILY
Refills: 0 | Status: DISCONTINUED | OUTPATIENT
Start: 2025-07-11 | End: 2025-07-12

## 2025-07-11 RX ORDER — AMOXICILLIN AND CLAVULANATE POTASSIUM 500; 125 MG/1; MG/1
1 TABLET, FILM COATED ORAL
Refills: 0 | Status: DISCONTINUED | OUTPATIENT
Start: 2025-07-11 | End: 2025-07-11

## 2025-07-11 RX ADMIN — METOPROLOL SUCCINATE 50 MILLIGRAM(S): 50 TABLET, EXTENDED RELEASE ORAL at 21:50

## 2025-07-11 RX ADMIN — METOPROLOL SUCCINATE 100 MILLIGRAM(S): 50 TABLET, EXTENDED RELEASE ORAL at 18:07

## 2025-07-11 RX ADMIN — Medication 650 MILLIGRAM(S): at 21:49

## 2025-07-11 RX ADMIN — INSULIN GLARGINE-YFGN 9 UNIT(S): 100 INJECTION, SOLUTION SUBCUTANEOUS at 21:50

## 2025-07-11 RX ADMIN — ATORVASTATIN CALCIUM 40 MILLIGRAM(S): 80 TABLET, FILM COATED ORAL at 21:50

## 2025-07-11 RX ADMIN — Medication 75 MILLILITER(S): at 14:36

## 2025-07-11 RX ADMIN — AMOXICILLIN AND CLAVULANATE POTASSIUM 1 TABLET(S): 500; 125 TABLET, FILM COATED ORAL at 21:49

## 2025-07-11 RX ADMIN — AMLODIPINE BESYLATE 5 MILLIGRAM(S): 10 TABLET ORAL at 18:07

## 2025-07-11 RX ADMIN — Medication 250 MILLILITER(S): at 14:37

## 2025-07-11 RX ADMIN — Medication 25 MICROGRAM(S): at 18:52

## 2025-07-11 NOTE — DISCHARGE NOTE PROVIDER - NSDCQMCOGNITION_NEU_ALL_CORE
----- Message from Yariel Galloway MD sent at 1/31/2023 10:35 AM CST -----  Continue estradiol valerate 6 mg twice weekly   No difficulties

## 2025-07-11 NOTE — PATIENT PROFILE ADULT - FALL HARM RISK - FACTORS NURSING JUDGEMENT
Lots of fluids. Take the antibiotic as directed. Use the pyridium as needed as directed for urinary pain. This will turn your urine a bright orange color and will stain anything that it may drip on.  If you develop worsening pain, fevers, vomiting, flank pain, or any other concerning symptoms you should be seen at that time. Otherwise follow up with primary care provider.      Patient Education     Urinary Tract Infections in Women  Urinary tract infections (UTIs) are most often caused by bacteria (germs). These bacteria enter the urinary tract. The bacteria may come from outside the body. Or they may travel from the skin outside the rectum or vagina into the urethra. Female anatomy makes it easier for bacteria from the bowel to enter a woman’s urinary tract, which is the most common source of UTI. This means women develop UTIs more often than men. Pain in or around the urinary tract is a common UTI symptom. But the only way to know for sure if you have a UTI for the health care provider to test your urine. The two tests that may be done are the urinalysis and urine culture.  Types of UTIs  · Cystitis: A bladder infection (cystitis) is the most common UTI in women. You may have urgent or frequent urination. You may also have pain, burning when you urinate, and bloody urine.  · Urethritis: This is an inflamed urethra, which is the tube that carries urine from the bladder to outside the body. You may have lower stomach or back pain. You may also have urgent or frequent urination.  · Pyelonephritis: This is a kidney infection. If not treated, it can be serious and damage your kidneys. In severe cases, you may be hospitalized. You may have a fever and lower back pain.  Medications to treat a UTI  Most UTIs are treated with antibiotics. These kill the bacteria. The length of time you need to take them depends on the type of infection. It may be as short as 3 days. If you have repeated UTIs, a low-dose antibiotic may be  needed for several months. Take antibiotics exactly as directed. Don’t stop taking them until all of the medication is gone. If you stop taking the antibiotic too soon, the infection may not go away, and you may develop a resistance to the antibiotic. This can make it much harder to treat.  Lifestyle changes to treat and prevent UTIs  The lifestyle changes below will help get rid of your UTI. They may also help prevent future UTIs.  · Drink plenty of fluids. This includes water, juice, or other caffeine-free drinks. Fluids help flush bacteria out of your body.  · Empty your bladder. Always empty your bladder when you feel the urge to urinate. And always urinate before going to sleep. Urine that stays in your bladder can lead to infection. Try to urinate before and after sex as well.  · Practice good personal hygiene. Wipe yourself from front to back after using the toilet. This helps keep bacteria from getting into the urethra.  · Use condoms during sex. These help prevent UTIs caused by sexually transmitted bacteria. Also, avoid using spermicides during sex. These can increase the risk of UTIs. Choose other forms of birth control instead. For women who tend to get UTIs after sex, a low-dose of a preventive antibiotic may be used. Be sure to discuss this option with your health care provider.  · Follow up with your health care provider as directed. He or she may test to make sure the infection has cleared. If necessary, additional treatment may be started.  © 7267-6478 [employer]. [address] [place] [address], [place] [address]. All rights reserved. This information is not intended as a substitute for professional medical care. Always follow your healthcare professional's instructions.            No

## 2025-07-11 NOTE — DISCHARGE NOTE PROVIDER - NSDCCPTREATMENT_GEN_ALL_CORE_FT
Subjective:      Patient ID: Milena Chaney is a 76 y.o. female.    Chief Complaint: Pain of the Right Foot    HPI 76-year-old lady with a painful right 1st MTP joint.  She also has overriding of the 2nd toe over the top of the great toe.  This is also painful with shoe wear.  This has been going on for several years  Review of Systems   Constitution: Negative for fever and weight loss.   Cardiovascular: Negative for chest pain and leg swelling.   Musculoskeletal: Positive for arthritis, joint pain and stiffness. Negative for joint swelling and muscle weakness.   Gastrointestinal: Negative for change in bowel habit.   Genitourinary: Negative for bladder incontinence and hematuria.   Neurological: Negative for focal weakness, numbness, paresthesias and sensory change.         Objective:      Examination of the right foot shows mild hallux valgus. She has a palpable bony prominence at the distal end of the 1st metatarsal.  She has 10° of extension and 20° of flexion at the MTP joint.    The 2nd toe is overriding the great toe at approximately a 60 degree angle.  It is reddened and tender to palpation      Ortho/SPM Exam            Assessment:       Encounter Diagnoses   Name Primary?    Bunion of great toe of right foot Yes    Hammertoe of second toe of right foot     Hallux rigidus of right foot           Plan:       Milena was seen today for pain.    Diagnoses and all orders for this visit:    Bunion of great toe of right foot  -     X-Ray Foot 2 View Right; Future    Hammertoe of second toe of right foot    Hallux rigidus of right foot     X-ray of the right foot shows complete loss of the articular cartilage space at the 1st MTP joint. She has large dorsal osteophytes at the distal end of the 1st metatarsal.  The 2nd toe can be seen over riding the great toe  I discussed various options with the patient and her .  She elects to have the 2nd toe amputated and a cheilectomy performed on the 1st MTP  joint.           PRINCIPAL PROCEDURE  Procedure: Left heart cardiac catheterization  Findings and Treatment: 7/11 s/p NAVEEN to LAD via RFA

## 2025-07-11 NOTE — PATIENT PROFILE ADULT - IN THE PAST 12 MONTHS, HAS LACK OF RELIABLE TRANSPORTATION KEPT YOU FROM MEDICAL APPOINTMENTS, MEETINGS, WORK OR FROM GETTING THINGS NEEDED FOR DAILY LIVING?
Subjective


Date of Service: 03/02/19


Chief Complaint: Diabetic foot/osteomyelitis





Patient seen and examined at bedside with RN.  Chart reviewed.  Case discussed 

with general surgery.  Patient currently is scheduled for surgical procedure 

with general surgery today.  Does state that she feels really hungry and wants 

to know what time her surgery scheduled for.





Review of Systems


10-point ROS is otherwise unremarkable





Physical Examination





- Vital Signs


Temperature: 98.5 F


Blood Pressure: 103/51


Pulse: 98


Respirations: 20


Pulse Ox (%): 92





- Physical Exam


General: Alert, In no apparent distress, Mild distress


HEENT: Atraumatic, PERRLA, EOMI


Neck: Supple, JVD not distended


Respiratory: Clear to auscultation bilaterally, Normal air movement


Cardiovascular: Regular rate/rhythm, Normal S1 S2


Gastrointestinal: Normal bowel sounds, No tenderness


Musculoskeletal: Erythema, Tenderness, Warmth


Integumentary: Skin lesion, Tenderness/swelling, Erythema, Warmth


Neurological: Normal speech, Normal tone, Normal affect


Lymphatics: No axilla or inguinal lymphadenopathy





- Studies


Laboratory Data (last 24 hrs)





03/01/19 20:58: PT 19.4 H, INR 1.68


03/01/19 20:40: Creatinine 1.07


03/01/19 20:40: WBC 18.7 H D, Hgb 10.5 L, Hct 32.2 L, Plt Count 524 H


03/01/19 20:40: Sodium 135 L, Potassium 4.0, BUN 21 H, Creatinine 1.09, Glucose 

190 H, Total Bilirubin 0.5, AST 46 H, ALT 56, Alkaline Phosphatase 93





Medications List Reviewed: Yes





Assessment And Plan





- Current Problems (Diagnosis)


(1) Diabetic ulcer of right heel


Current Visit: No   Status: Acute   


Plan: 


Diabetic ulcer to the right foot.  With recent debridement.  Failed outpatient 

therapy.


-IV antibiotics


-general surgery consulted.  Appreciated recommendations at this time


   -IV antibiotics and possible surgical procedure


-will get blood culture, wound culture, urine culture at this time


-wound care to the wound at this time


Qualifiers: 


   Diabetes mellitus type: type 2 





(2) Lymphedema of both lower extremities


Current Visit: No   Status: Chronic   





(3) Hypothyroid


Current Visit: No   Status: Chronic   


Qualifiers: 


   Hypothyroidism type: acquired   Qualified Code(s): E03.9 - Hypothyroidism, 

unspecified   





(4) Type 2 diabetes mellitus


Onset Date: 01/31/19   Current Visit: No   Status: Chronic   


Qualifiers: 


   Diabetes mellitus long term insulin use: with long term use   Diabetes 

mellitus complication status: with circulatory complication   Diabetes mellitus 

complication detail: with peripheral angiopathy with gangrene   Qualified Code(s

): E11.52 - Type 2 diabetes mellitus with diabetic peripheral angiopathy with 

gangrene; Z79.4 - Long term (current) use of insulin   


Discharge Plan: Home


Plan to discharge in: Greater than 2 days





- Code Status/Comfort Care


Code Status Assessed: Yes


Critical Care: No no

## 2025-07-11 NOTE — DISCHARGE NOTE PROVIDER - NSDCCPCAREPLAN_GEN_ALL_CORE_FT
PRINCIPAL DISCHARGE DIAGNOSIS  Diagnosis: CAD (coronary artery disease)  Assessment and Plan of Treatment: You have a diagnosis of coronary artery disease and underwent a cardiac catheterization where you received a stent to your LAD coronary artery. You have been started on Aspirin 81mg daily, Plavix 75mg daily. Continue taking all medications as prescribed.   The procedure was done through the wrist/groin. Please avoid any heavy lifting (no more than 3 to 5 lbs), strenuous activity, bending, straining, or unnecessary stair climbing for 2 weeks. No driving for 2 days. You may shower 24 hours following the procedure but avoid baths/swimming for 1 week. If you develop any swelling, bleeding, hardening of the skin (hematoma formation), acute pain, numbness/tingling in your arm or leg, or have any questions/concerns regarding your procedure, please call [facility] [phone]  NEVER miss a dose of Aspirin and Plavix, Brilinta Effient to ensure your stent does not close. DO NOT STOP THESE MEDICATIONS FOR ANY REASON UNLESS OTHERWISE INDICATED BY YOUR CARDIOLOGIST BECAUSE THIS WILL PUT YOU AT RISK OF YOUR STENT CLOSING AND HAVING A HEART ATTACK OR SUDDEN SEVERE LEG PAIN DUE TO BLOCKAGE OF BLOOD FLOW TO LEG.  You have been given a Stent Card to carry with you in your wallet.  Make Photocopies or take a picture of card so you have a backup copy.  This card has important information for any possible future Radiology/MRI studies.    Please make a follow up appointment with your cardiologist within 1-2 weeks of your discharge. All of your prescriptions have been sent electronically to your pharmacy.

## 2025-07-11 NOTE — CHART NOTE - NSCHARTNOTEFT_GEN_A_CORE
Pt here for [facility] referred by DR hernandez  no changes in meds or physical assessment, last dose of eliquis on 7/6/25 at pm dose  refusal for blood products consent signed  labs & vitals.

## 2025-07-11 NOTE — DISCHARGE NOTE PROVIDER - NSDCMRMEDTOKEN_GEN_ALL_CORE_FT
amiodarone 200 mg oral tablet: 0.5 tablet orally once a day  amLODIPine 5 mg oral tablet: 1 tab(s) orally once a day  amoxicillin-clavulanate 875 mg-125 mg oral tablet: 875 milligram(s) orally 2 times a day  apixaban 5 mg oral tablet: 1 tab(s) orally 2 times a day  aspirin 81 mg oral delayed release tablet: 1 tab(s) orally once a day Continue for 7 days only, STOP on 7/19  atorvastatin 40 mg oral tablet: 1 tab(s) orally once a day  CARDIAC REHAB: 3 times a week for 12 week.  clopidogrel 75 mg oral tablet: 1 tab(s) orally once a day  Diovan 320 mg oral tablet: 1 tab(s) orally once a day  glimepiride 2 mg oral tablet: 1 tab(s) orally once a day  Glucophage 1000 mg oral tablet: 1 tablet orally 2 times a day HOLD for 48 hrs, RESUME 7/14  magnesium oxide 400 mg oral tablet: 1 tab(s) orally 2 times a day  metoprolol succinate 100 mg oral tablet, extended release: 1 orally once a day Morning  metoprolol succinate 50 mg oral capsule, extended release: 1 orally once a day (at bedtime)  Trelegy Ellipta 100 mcg-62.5 mcg-25 mcg/inh inhalation powder: 1 dose(s) inhaled once a day  Tresiba FlexTouch 100 units/mL subcutaneous solution: 15 unit(s) subcutaneous once a day   amiodarone 200 mg oral tablet: 0.5 tablet orally once a day  amLODIPine 5 mg oral tablet: 1 tab(s) orally once a day  amoxicillin-clavulanate 875 mg-125 mg oral tablet: 875 milligram(s) orally 2 times a day  apixaban 5 mg oral tablet: 1 tab(s) orally 2 times a day HOLD restart on 7/13 PM  aspirin 81 mg oral delayed release tablet: 1 tab(s) orally once a day Continue for 7 days only, STOP on 7/19  atorvastatin 40 mg oral tablet: 1 tab(s) orally once a day  clopidogrel 75 mg oral tablet: 1 tab(s) orally once a day MDD: 1  Diovan 320 mg oral tablet: 1 tab(s) orally once a day  glimepiride 2 mg oral tablet: 1 tab(s) orally once a day  Glucophage 1000 mg oral tablet: 1 tablet orally 2 times a day HOLD for 48 hrs, RESUME 7/14  magnesium oxide 400 mg oral tablet: 1 tab(s) orally 2 times a day  metoprolol succinate 100 mg oral tablet, extended release: 1 orally once a day Morning  metoprolol succinate 50 mg oral capsule, extended release: 1 orally once a day (at bedtime)  Trelegy Ellipta 100 mcg-62.5 mcg-25 mcg/inh inhalation powder: 1 dose(s) inhaled once a day  Tresiba FlexTouch 100 units/mL subcutaneous solution: 15 unit(s) subcutaneous once a day

## 2025-07-11 NOTE — DISCHARGE NOTE PROVIDER - NSDCFUSCHEDAPPT_GEN_ALL_CORE_FT
[facility]  CARDIOLOGY [address]   Scheduled Appointment: 08/14/2025    Aman Nunez  [facility]  PULMMED [address]  Scheduled Appointment: 09/29/2025

## 2025-07-11 NOTE — DISCHARGE NOTE PROVIDER - NSDCFUADDINST_GEN_ALL_CORE_FT
Wound Care:   the day AFTER your procedure remove bandage GENTTLY, and clean using  mild soap and gentle warm, water stream, pat dry. leave OPEN to air. YOU MAY SHOWER   DO NOT apply lotions, creams, ointments, powder, parfumes to your incision site  DO NOT SOAK your site for 1 week ( no baths, no pools, no tubs, etc...)  Check  your groin and /or wirst daily.A small amount of bruising, and soarness are normal    ACTIVITY: for 24 hours   - DO NOT DRIVE  - DO NOT make any important decisions or sign legal documents   - DO NOT operate heavy machinaries   - you may resume sexual activity in 48 hours, unless otherwise instructed by your cardiologist     If your procedure was done through the WRIST: for the NEXT 3DAYS:  - avoid pushing, pulling, with that affected wrist   - avoid repeated movement of that hand and wrist ( eg: typing, hammering)  - DO NOT LIFT anything more than 5 lbs     If your procedure was done through the GROIN: for the NEXT 5 DAYS  - Limit climbing stairs, DO NOT soak in bathtub or pool  - no strenous activities, pushing, pulling, straining  - Do not lift anything 10lbs or heavier     MEDICATION:   take your medications as explained ( see discharge paperwork)   If you received a STENT, you will be taking antiplatelet medications to KEEP YOUR STENT OPEN ( eg: Aspirin, Plavix, Brilinta, Effient, etc).  Take as prescribed DO NOT STOP taking them without consulting with your cardiologist first.     Follow heart healthy diet reccomended by your doctor, , if you smoke STOP SMOKING ( may call [phone] for center of tobacco control if you need assistance)     CALL your doctor to make appointment in 2 WEEKS     ***CALL YOUR DOCTOR***  if you experience: fever, chills, body aches, or severe pain, swelling, redness, heat or yellow discharge at incision site  If you experience Bleeding or excruciating pain at the procedural site, sweliing ( golf ball size) at your procedural site  If you experience CHEST PAIN  If you experience extremity numbness, tingling, temperature change ( of your procedural site)   If you are unable to reach your doctor, you may contact:   -Cardiology Office at [facility] at [phone] or   - [facility] [phone] - [facility] [phone]

## 2025-07-11 NOTE — ASU PATIENT PROFILE, ADULT - FALL HARM RISK - UNIVERSAL INTERVENTIONS
Bed in lowest position, wheels locked, appropriate side rails in place/Call bell, personal items and telephone in reach/Instruct patient to call for assistance before getting out of bed or chair/Non-slip footwear when patient is out of bed/[employer] to call system/Purposeful Proactive Rounding/Room/bathroom lighting operational, light cord in reach

## 2025-07-11 NOTE — PATIENT PROFILE ADULT - MEDICATIONS/VISITS
PT was restrained in car seat on top of shopping cart with padded blankets over him when car seat fell from the cart, landing upside down.  Patient's mother does not feel he hit his head or was injured, but was instructed by PCP to come to ED for evaluation.   
no

## 2025-07-11 NOTE — DISCHARGE NOTE PROVIDER - HOSPITAL COURSE
84 year old female underwent a LHC on 7/11 s/p NAVEEN to LAD via RFA with angioseal (view catheterization report for full details). Post procedure, femoral sheath was removed according to protocol. Patient developed hematoma which resolved with manual compression. Patient remained hemodynamically stable, neurovascularly intact and chest pain free. Patient voided and ambulated and had no EKG changes post procedure. She remained overnight for observation and pain control.  The remainder of his hospitalization was uneventful. She was provided education regarding DAPT/statin therapy, as well as post procedure wound care instructions. She will follow up with his private cardiologist in 2 weeks and advised to return to ER with any concerning symptoms.     84 year old female underwent a LHC on 7/11 s/p NAVEEN to LAD via RFA with angioseal (view catheterization report for full details). Post procedure, femoral sheath was removed according to protocol. Patient developed hematoma which resolved with manual compression. Patient remained hemodynamically stable, neurovascularly intact and chest pain free. Patient voided and ambulated and had no EKG changes post procedure. She remained overnight for observation and pain control.  The remainder of his hospitalization was uneventful. She was provided education regarding DAPT/statin therapy, as well as post procedure wound care instructions. She will follow up with his private cardiologist in 2 weeks and advised to return to ER with any concerning symptoms        < from: Cardiac Catheterization (07.11.25 @ 16:12)     Coronary Angiography   The coronary circulation is right dominant.      LM   Left main artery: Angiography shows mild atherosclerosis.      LAD   Proximal left anterior descending: There is an 80 % stenosis.      CX   Circumflex: Angiography shows no disease.      RCA   Mid right coronary artery: There is a 30 % stenosis.      < end of copied text >    < from: Cardiac Catheterization (07.11.25 @ 16:12) >    Conclusions:   Successful PCI with NAVEEN to the proximal LAD . Old stents are patent.  Triple therapy for 1 week and then drop ASA  start ELiquis 7/13 PM Demond night

## 2025-07-12 ENCOUNTER — TRANSCRIPTION ENCOUNTER (OUTPATIENT)
Age: 85
End: 2025-07-12

## 2025-07-12 VITALS — HEART RATE: 60 BPM | OXYGEN SATURATION: 96 % | TEMPERATURE: 98 F | RESPIRATION RATE: 17 BRPM

## 2025-07-12 LAB
A1C WITH ESTIMATED AVERAGE GLUCOSE RESULT: 9.4 % — HIGH (ref 4–5.6)
ESTIMATED AVERAGE GLUCOSE: 223 MG/DL — HIGH (ref 68–114)
GLUCOSE BLDC GLUCOMTR-MCNC: 181 MG/DL — HIGH (ref 70–99)
GLUCOSE BLDC GLUCOMTR-MCNC: 222 MG/DL — HIGH (ref 70–99)
HCT VFR BLD CALC: 29.5 % — LOW (ref 34.5–45)
HGB BLD-MCNC: 9.2 G/DL — LOW (ref 11.5–15.5)
MCHC RBC-ENTMCNC: 24.3 PG — LOW (ref 27–34)
MCHC RBC-ENTMCNC: 31.2 G/DL — LOW (ref 32–36)
MCV RBC AUTO: 77.8 FL — LOW (ref 80–100)
NRBC # BLD AUTO: 0 K/UL — SIGNIFICANT CHANGE UP (ref 0–0)
NRBC # FLD: 0 K/UL — SIGNIFICANT CHANGE UP (ref 0–0)
NRBC BLD AUTO-RTO: 0 /100 WBCS — SIGNIFICANT CHANGE UP (ref 0–0)
PLATELET # BLD AUTO: 180 K/UL — SIGNIFICANT CHANGE UP (ref 150–400)
PMV BLD: 11 FL — SIGNIFICANT CHANGE UP (ref 7–13)
RBC # BLD: 3.79 M/UL — LOW (ref 3.8–5.2)
RBC # FLD: 17.1 % — HIGH (ref 10.3–14.5)
WBC # BLD: 6.24 K/UL — SIGNIFICANT CHANGE UP (ref 3.8–10.5)
WBC # FLD AUTO: 6.24 K/UL — SIGNIFICANT CHANGE UP (ref 3.8–10.5)

## 2025-07-12 PROCEDURE — 85027 COMPLETE CBC AUTOMATED: CPT

## 2025-07-12 PROCEDURE — 83036 HEMOGLOBIN GLYCOSYLATED A1C: CPT

## 2025-07-12 PROCEDURE — 93454 CORONARY ARTERY ANGIO S&I: CPT | Mod: 59

## 2025-07-12 PROCEDURE — 93926 LOWER EXTREMITY STUDY: CPT

## 2025-07-12 PROCEDURE — C1874: CPT

## 2025-07-12 PROCEDURE — 93005 ELECTROCARDIOGRAM TRACING: CPT

## 2025-07-12 PROCEDURE — C1760: CPT

## 2025-07-12 PROCEDURE — C9600: CPT | Mod: LD

## 2025-07-12 PROCEDURE — C1769: CPT

## 2025-07-12 PROCEDURE — 80048 BASIC METABOLIC PNL TOTAL CA: CPT

## 2025-07-12 PROCEDURE — C1887: CPT

## 2025-07-12 PROCEDURE — C1894: CPT

## 2025-07-12 PROCEDURE — 83735 ASSAY OF MAGNESIUM: CPT

## 2025-07-12 PROCEDURE — 82962 GLUCOSE BLOOD TEST: CPT

## 2025-07-12 RX ORDER — BISACODYL 5 MG
1 TABLET, DELAYED RELEASE (ENTERIC COATED) ORAL
Qty: 5 | Refills: 0
Start: 2025-07-12 | End: 2025-07-16

## 2025-07-12 RX ORDER — CLOPIDOGREL BISULFATE 75 MG/1
1 TABLET, FILM COATED ORAL
Qty: 90 | Refills: 3
Start: 2025-07-12 | End: 2026-07-06

## 2025-07-12 RX ADMIN — Medication 150 MILLILITER(S): at 10:38

## 2025-07-12 RX ADMIN — Medication 320 MILLIGRAM(S): at 05:23

## 2025-07-12 RX ADMIN — AMIODARONE HYDROCHLORIDE 200 MILLIGRAM(S): 50 INJECTION, SOLUTION INTRAVENOUS at 05:23

## 2025-07-12 RX ADMIN — Medication 1000 MILLILITER(S): at 09:00

## 2025-07-12 RX ADMIN — AMOXICILLIN AND CLAVULANATE POTASSIUM 1 TABLET(S): 500; 125 TABLET, FILM COATED ORAL at 10:37

## 2025-07-12 RX ADMIN — Medication 150 MILLILITER(S): at 11:13

## 2025-07-12 RX ADMIN — INSULIN LISPRO 2: 100 INJECTION, SOLUTION INTRAVENOUS; SUBCUTANEOUS at 07:46

## 2025-07-12 RX ADMIN — Medication 81 MILLIGRAM(S): at 05:23

## 2025-07-12 RX ADMIN — CLOPIDOGREL BISULFATE 75 MILLIGRAM(S): 75 TABLET, FILM COATED ORAL at 05:23

## 2025-07-12 RX ADMIN — Medication 500 MILLILITER(S): at 11:23

## 2025-07-12 RX ADMIN — INSULIN LISPRO 4: 100 INJECTION, SOLUTION INTRAVENOUS; SUBCUTANEOUS at 11:22

## 2025-07-12 NOTE — DISCHARGE NOTE NURSING/CASE MANAGEMENT/SOCIAL WORK - FINANCIAL ASSISTANCE
[facility] provides services at a reduced cost to those who are determined to be eligible through [facility]’s financial assistance program. Information regarding [facility]’s financial assistance program can be found by going to [email or web address] or by calling [phone].

## 2025-07-12 NOTE — PROGRESS NOTE ADULT - ASSESSMENT
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 Recently seen by Dr. Bravo due to LE edema - venous doppler negative for DVT. Amlodipine decreased to 5 mg daily. She reports of QUEZADA with mild exertion (<1/2 block onset), intermittently dizziness and leg weakness with walking. She reports of sharp pain in chest at times which resolves on own. Referred for C today due to persistence of symptoms.   Of note, she is currently being treated for a UTI on Augmentin but is unsure how many doses she has remaining.  Referring: Dr. Garrett     # CAD  7/11 s/p NAVEEN x1 to LAD via RFA  Continue Aspirin 81 mg daily, Plavix 75 mg daily  Continue Metoprolol Succinate daily   Continue Atorvastatin 40 mg daily  Triple therapy x1 week, then drop Aspirin and continue Plavix/Eliquis  Monitor telemetry  Keep Mg >2 K >4  Follow up appt in 2 weeks post discharge with outpatient cardiologist  Anticipate discharge in AM if site and condition remain stable    # Hematoma  Right groin with reoccurring hematoma  Manually compressed multiple times overnight  Fem stop applied  Hemoglobin 10.1 -> 8.9  Pending right LE VA duplex results r/o pseudoaneurysm     # HTN  Normotensive  Continue Metoprolol Succinate daily, Valsartan 320 mg daily, Amlodipine 5 mg daily  DASH diet    # HLD  Continue Atorvastatin 40 mg daily  DASH diet    # T2DM  Continue SSI while inpatient  FS TID/HS  Continue consistent carb diet  Will resume home diabetic med regimen upon discharge    Ryan Zimmerman [facility]  Invasive Cardiology  Ext [phone]

## 2025-07-12 NOTE — PROVIDER CONTACT NOTE (OTHER) - ASSESSMENT
Pt reports dizziness with standing/ambulation
VSS. Right groin post procedural site noted to have reoccurring hematoma. Diffuse ecchymosis noted. +2 pedal pulses palpated. feet remain warm and mobile

## 2025-07-12 NOTE — PROGRESS NOTE ADULT - ASSESSMENT
84 year F with PMH of TIA, CAD s/p PCI, Afib on Eliquis, PPM, COPD/Emphysema, Renal atrophy, HLD, HTN, T2DM, Anemia who presents for LHC/ PCI    - 7/11 Successful PCI with NAVEEN to the proximal LAD . Old stents are patent. via RFA ( failed angio- seal) by Dr Forrester  - overnight stay complicated by R groin hematoma and need for extensive manual compression and need for fem stop device placement by night ACP  - This am pt seen and examined, fem stop removed, Groin site stable + ecchymoses as expected but soft + pulses+ capillary refill, pt w/o any complains of pain, numbness/ tingling   no bruit on ascultation.   - R groin duplex performed results pending to r/o pseudo   - CBC remains stable   - pt ambulated with assistance multiple times around the unit, groin cont to remain stable; groin monitored closely all morning  - pt experienced some dizziness upon standing, after being BR all night, + orthostatics s/p IVF   - pt verb no more dizziness and repeat orthostatics BP improved   - Continue DAPT (aspirin 81mg and clopidogrel 75mg)  - Eliquis on HOLD, to restart Demond night 7/13 if groin stable ( pt teaching done)   - Aspirin for 1 week only then stop ( pt aware)  - Continue atorvastatin  - cont rest of home meds        - Reviewed and reinforced with patient:  wound care instructions, activities dos and dont's, medication compliance specifically antiplatelet therapy given stent/s.    - Patient aware to take DAPT  as prescribed and DO NOT STOP taking without consulting cardiologist first or STENT/s WILL CLOSE  - Reviewed and reinforced with patient:  site complications ( eg: bleeding, excruciating pain at the procedural site, large swelling ball size-  extremity numbness, tingling, temperature change), or CHEST PAIN; pt aware that if any of those occur he/she must call cardiologist IMMEDIATELY or 911 or go to nearest emergency room   - Reviewed and reinforced a heart healthy diet, Smoking Cessation  - Avoid using NSAIDs  (Aleve, Motrin, ibuprofen, naproxen) while on DAPT, please utilize Tylenol for pain control (not to exceed 4gm in 24 hours)  - Patient verbalizes understanding of ALL OF THE ABOVE, and gives positive feedback       - spoke with Dr Forrester, awake of hematoma overnight and now resolution  - in agreement with discharge home after 12 if cont to remain stable and stable US ( will follow, reading expedited by management)   -f/u appt in 1-2 weeks post dc with outpt cardiologist Lester Borja NP  invasive cardiology    84 year F with PMH of TIA, CAD s/p PCI, Afib on Eliquis, PPM, COPD/Emphysema, Renal atrophy, HLD, HTN, T2DM, Anemia who presents for LHC/ PCI    - 7/11 Successful PCI with NAVEEN to the proximal LAD . Old stents are patent. via RFA ( failed angio- seal) by Dr Forrester  - overnight stay complicated by R groin hematoma and need for extensive manual compression and need for fem stop device placement by night ACP  - This am pt seen and examined, fem stop removed, Groin site stable + ecchymoses as expected but soft + pulses+ capillary refill, pt w/o any complains of pain, numbness/ tingling   no bruit on ascultation.   - R groin duplex performed results pending to r/o pseudo   - CBC remains stable   - pt ambulated with assistance multiple times around the unit, groin cont to remain stable; groin monitored closely all morning  - pt experienced some dizziness upon standing, after being BR all night, + orthostatics s/p IVF   - pt verb no more dizziness and repeat orthostatics BP improved   - Continue DAPT (aspirin 81mg and clopidogrel 75mg)  - Eliquis on HOLD, to restart Demond night 7/13 if groin stable ( pt teaching done)   - Aspirin for 1 week only then stop ( pt aware)  - Continue atorvastatin  - cont rest of home meds        - Reviewed and reinforced with patient:  wound care instructions, activities dos and dont's, medication compliance specifically antiplatelet therapy given stent/s.    - Patient aware to take DAPT  as prescribed and DO NOT STOP taking without consulting cardiologist first or STENT/s WILL CLOSE  - Reviewed and reinforced with patient:  site complications ( eg: bleeding, excruciating pain at the procedural site, large swelling ball size-  extremity numbness, tingling, temperature change), or CHEST PAIN; pt aware that if any of those occur he/she must call cardiologist IMMEDIATELY or 911 or go to nearest emergency room   - Reviewed and reinforced a heart healthy diet, Smoking Cessation  - Avoid using NSAIDs  (Aleve, Motrin, ibuprofen, naproxen) while on DAPT, please utilize Tylenol for pain control (not to exceed 4gm in 24 hours)  - Patient verbalizes understanding of ALL OF THE ABOVE, and gives positive feedback       - spoke with Dr Forrester, aware of hematoma overnight and now resolution  - in agreement with discharge home after 12 if cont to remain stable and stable US ( will follow, reading expedited by management)   -f/u appt in 1-2 weeks post dc with outpt cardiologist Lester Borja NP  invasive cardiology    84 year F with PMH of TIA, CAD s/p PCI, Afib on Eliquis, PPM, COPD/Emphysema, Renal atrophy, HLD, HTN, T2DM, Anemia who presents for LHC/ PCI    - 7/11 Successful PCI with NAVEEN to the proximal LAD . Old stents are patent. via RFA ( failed angio- seal) by Dr Forrester  - overnight stay complicated by R groin hematoma and need for extensive manual compression and need for fem stop device placement by night ACP  - This am pt seen and examined, fem stop removed, Groin site stable + ecchymoses as expected but soft + pulses+ capillary refill, pt w/o any complains of pain, numbness/ tingling   no bruit on ascultation.   - R groin duplex performed NO pseudo or AVF  - CBC remains stable   - pt ambulated with assistance multiple times around the unit, groin cont to remain stable; groin monitored closely all morning  - pt experienced some dizziness upon standing, after being BR all night, + orthostatics s/p IVF   - pt verb no more dizziness and repeat orthostatics BP improved   - Continue DAPT (aspirin 81mg and clopidogrel 75mg)  - Eliquis on HOLD, to restart Demond night 7/13 if groin stable ( pt teaching done)   - Aspirin for 1 week only then stop ( pt aware)  - Continue atorvastatin  - cont rest of home meds        - Reviewed and reinforced with patient:  wound care instructions, activities dos and dont's, medication compliance specifically antiplatelet therapy given stent/s.    - Patient aware to take DAPT  as prescribed and DO NOT STOP taking without consulting cardiologist first or STENT/s WILL CLOSE  - Reviewed and reinforced with patient:  site complications ( eg: bleeding, excruciating pain at the procedural site, large swelling ball size-  extremity numbness, tingling, temperature change), or CHEST PAIN; pt aware that if any of those occur he/she must call cardiologist IMMEDIATELY or 911 or go to nearest emergency room   - Reviewed and reinforced a heart healthy diet, Smoking Cessation  - Avoid using NSAIDs  (Aleve, Motrin, ibuprofen, naproxen) while on DAPT, please utilize Tylenol for pain control (not to exceed 4gm in 24 hours)  - Patient verbalizes understanding of ALL OF THE ABOVE, and gives positive feedback       - spoke with Dr Forrester, aware of hematoma overnight and now resolution  - in agreement with discharge home after 12 if cont to remain stable and stable US ( will follow, reading expedited by management)   -f/u appt in 1-2 weeks post dc with outpt cardiologist Lester Borja NP  invasive cardiology

## 2025-07-12 NOTE — DISCHARGE NOTE NURSING/CASE MANAGEMENT/SOCIAL WORK - PATIENT PORTAL LINK FT
You can access the FollowMyHealth Patient Portal offered by [facility] by registering at the following website: [email or web address]. By joining [facility]’s FollowMyHealth portal, you will also be able to view your health information using other applications (apps) compatible with our system.

## 2025-07-12 NOTE — DISCHARGE NOTE NURSING/CASE MANAGEMENT/SOCIAL WORK - NSDCPEFALRISK_GEN_ALL_CORE
For information on Fall & Injury Prevention, visit: [email or web address] OR  [email or web address] OR  https://www.cdc.gov/steadi/patient.html

## 2025-07-12 NOTE — DISCHARGE NOTE NURSING/CASE MANAGEMENT/SOCIAL WORK - NSDCVIVACCINE_GEN_ALL_CORE_FT
Tdap; 29-Oct-2022 13:54; Herbert Aguilar (NP); [employer]; [id number] (Exp. Date: 08-Dec-2024); IntraMuscular; Deltoid Left.; 0.5 milliLiter(s); VIS (VIS Published: 09-May-2013, VIS Presented: 29-Oct-2022);

## 2025-07-12 NOTE — PROGRESS NOTE ADULT - SUBJECTIVE AND OBJECTIVE BOX
Interventional Cardiology Post Cath Progress Note  CARDIAC CATH LAB, ACP TEAM   [phone]      CHIEF COMPLAINT: Patient is a 84y old  Female who presents with a chief complaint of PCI (11 Jul 2025 21:39)      HPI: 84 year F, Jehovah Witness, with PMH of TIA, CAD s/p PCI, Afib on Eliquis (last dose 7/7 evening), PPM, COPD/Emphysema, Renal atrophy, HLD, HTN, T2DM, Anemia who presents for LHC. Recently seen by Dr. Bravo due to LE edema - venous doppler negative for DVT. Amlodipine decreased to 5 mg daily. She reports of QUEZADA with mild exertion (<1/2 block onset), intermittently dizziness and leg weakness with walking. She reports of sharp pain in chest at times which resolves on own. Referred for C today due to persistence of symptoms.   Of note, she is currently being treated for a UTI on Augmentin but is unsure how many doses she has remaining.  Referring: Dr. Garrett        Subjective/Observations: patient seen and examined.  denies chest pain, dyspena, dizziness, palpitations, N&V, HA      Review of Systems all WNL except below indicated:    Constitutional: [ ] Fever [ ] Chills [ ] Fatigue [ ] Weight change   HEENT: [ ] Blurred vision [ ] Eye Pain [ ] Headache [ ] Runny nose [ ] Sore Throat   Respiratory: [ ] Cough [ ] Wheezing [ ] Shortness of breath  Cardiovascular: [ ] Chest Pain [ ] Palpitations [ ] QUEZADA [ ] PND [ ] Orthopnea  Gastrointestinal: [ ] Abdominal Pain [ ] Diarrhea [ ] Constipation [ ] Hemorrhoids [ ] Nausea [ ] Vomiting  Genitourinary: [ ] Nocturia [ ] Dysuria [ ] Incontinence  Extremities: [ ] Swelling [ ] Joint Pain  Neurologic: [ ] Focal deficit [ ] Paresthesias [ ] Syncope  Lymphatic: [ ] Swelling [ ] Lymphadenopathy   Skin: [ ] Rash [ ] Ecchymoses [ ] Wounds [ ] Lesions  Psychiatry: [ ] Depression [ ] Suicidal/Homicidal Ideation [ ] Anxiety [ ] Sleep Disturbances  [x ] 10 point review of systems is otherwise negative except as mentioned above            [ ]Unable to obtain    Objective:  Vital Signs Last 24 Hrs  T(C): 36.6 (12 Jul 2025 11:00), Max: 36.9 (11 Jul 2025 17:00)  T(F): 97.9 (12 Jul 2025 11:00), Max: 98.4 (11 Jul 2025 17:00)  HR: 60 (12 Jul 2025 11:00) (60 - 60)  BP: 134/60 (12 Jul 2025 08:00) (134/60 - 176/81)  BP(mean): 116 (12 Jul 2025 05:15) (91 - 116)  RR: 17 (12 Jul 2025 11:00) (16 - 18)  SpO2: 98% (12 Jul 2025 11:00) (97% - 100%)    Parameters below as of 12 Jul 2025 11:00  Patient On (Oxygen Delivery Method): room air        07-11-25 @ 07:01  -  07-12-25 @ 07:00  --------------------------------------------------------  IN: 0 mL / OUT: 1200 mL / NET: -1200 mL    07-12-25 @ 07:01  -  07-12-25 @ 11:59  --------------------------------------------------------  IN: 360 mL / OUT: 0 mL / NET: 360 mL        PAST MEDICAL & SURGICAL HISTORY:  Atrial Fibrillation      DM (Diabetes Mellitus)      HTN (Hypertension)      Hypercholesteremia      Bradycardia      CAD (coronary artery disease)      Kidney stones      History of COPD      Anemia      Cardiac Pacemaker      S/P Tonsillectomy      History of Oophorectomy      Status Post Hernia Repair      S/P Cataract Surgery      Breast Cyst  L breast cyst removed.      Stented coronary artery          SOCIAL HISTORY:  No tobacco, ethanol, or drug abuse.    FAMILY HISTORY:  Family history of hypertension (Father, Mother, Sibling, Aunt)      No family history of acute MI or sudden cardiac death.    MEDICATIONS  (STANDING):  aMIOdarone    Tablet 200 milliGRAM(s) Oral daily  amLODIPine   Tablet 5 milliGRAM(s) Oral daily  amoxicillin  875 milliGRAM(s)/clavulanate 1 Tablet(s) Oral two times a day  aspirin  chewable 81 milliGRAM(s) Oral daily  atorvastatin 40 milliGRAM(s) Oral at bedtime  clopidogrel Tablet 75 milliGRAM(s) Oral daily  dextrose 5%. 1000 milliLiter(s) (50 mL/Hr) IV Continuous <Continuous>  dextrose 5%. 1000 milliLiter(s) (100 mL/Hr) IV Continuous <Continuous>  dextrose 50% Injectable 25 Gram(s) IV Push once  dextrose 50% Injectable 12.5 Gram(s) IV Push once  dextrose 50% Injectable 25 Gram(s) IV Push once  glucagon  Injectable 1 milliGRAM(s) IntraMuscular once  insulin glargine Injectable (LANTUS) 9 Unit(s) SubCutaneous at bedtime  insulin lispro (ADMELOG) corrective regimen sliding scale   SubCutaneous three times a day before meals  metoprolol succinate  milliGRAM(s) Oral daily  metoprolol succinate ER 50 milliGRAM(s) Oral at bedtime  sodium chloride 0.9%. 1000 milliLiter(s) (75 mL/Hr) IV Continuous <Continuous>  valsartan 320 milliGRAM(s) Oral daily    MEDICATIONS  (PRN):  dextrose Oral Gel 15 Gram(s) Oral once PRN Blood Glucose LESS THAN 70 milliGRAM(s)/deciliter      Allergies    penicillin (Unknown)    Intolerances                                9.2    6.24  )-----------( 180      ( 12 Jul 2025 06:25 )             29.5     07-11    135  |  104  |  22  ----------------------------<  297[H]  4.5   |  19[L]  |  1.14    Ca    8.8      11 Jul 2025 22:57  Mg     1.8     07-11        Urinalysis Basic - ( 11 Jul 2025 22:57 )    Color: x / Appearance: x / SG: x / pH: x  Gluc: 297 mg/dL / Ketone: x  / Bili: x / Urobili: x   Blood: x / Protein: x / Nitrite: x   Leuk Esterase: x / RBC: x / WBC x   Sq Epi: x / Non Sq Epi: x / Bacteria: x      Physical Exam:  No apparent distress, alert and oriented times three, appropriate affect  JVD is not elevated, supple  Clear to auscultation with no wheezing, ronchi or crackles  Regular rate and rhythm with no murmur, rub or gallop  Positive bowel sounds, soft, non-tender, non-distended, no masses/guarding or rebound tenderness  RIGHT groin w/o bleeding or hematoma.  soft, non tender, + ecchymosis. Pulses in the (right) lower extremity are (palpable +2).   Denies chest pain, denies groin/leg/foot: pain, numbness or tingling         < from: Cardiac Catheterization (07.11.25 @ 16:12) >  Procedures Performed   Procedures:               1.    Diagnostic Coronary Angiography     2.    Ultrasound Guided Access   3.    Arterial Access - Right Femoral   4.    PCI: NAVEEN   5.    AngioSeal     Indications:                ACS greater than 24 hours   Lab Visit Indication:      ACS greater than 24 hours   PCI Status:                elective     Conclusions:   Successful PCI with NAVEEN to the proximal LAD . Old stents are patent.  Triple therapy for 1 week and then drop ASA    Coronary Angiography   Left Coronary System:   A catheter was positioned into the vessel ostia under fluoroscopic  guidance. Angiograms were obtained in multiple views.  Right Coronary System:   A catheter was positioned into the vessel ostia under fluoroscopic  guidance. Angiograms were obtained in multiple views.    Diagnostic Findings:     Coronary Angiography   The coronary circulation is right dominant.      LM   Left main artery: Angiography shows mild atherosclerosis.      LAD   Proximal left anterior descending: There is an 80 % stenosis.      Patient: COMPA ROJAS          MRN: [id number]  Study Date: 07/11/2025   04:12 PM      Page 1 of 4          CX   Circumflex: Angiography shows no disease.      RCA   Mid right coronary artery: There is a 30 % stenosis.        < end of copied text >  < from: TTE W or WO Ultrasound Enhancing Agent (07.20.23 @ 07:51) >  CONCLUSIONS:      1. Normal left ventricular cavity size. The left ventricular wall thickness is normal. The left ventricular systolic function is normal with an ejection fraction of 56 % by Duffy's method of disks. There are no regional wall motion abnormalities seen.   2. The left ventricular diastolic function is indeterminate.   3. Normal right ventricular cavity size, normal right ventricular wall thickness and probably normal right ventricular systolic function. The tricuspid annular plane systolic excursion (TAPSE) is 2.6 cm (normal >=1.7 cm).   4. No pericardial effusion seen.   5. Compared to the transthoracic echocardiogram performed on 5/10/2023 there have been no significant interval changes.    < end of copied text >               Interventional Cardiology Post Cath Progress Note  CARDIAC CATH LAB, ACP TEAM   [phone]      CHIEF COMPLAINT: Patient is a 84y old  Female who presents with a chief complaint of PCI (11 Jul 2025 21:39)      HPI: 84 year F, Jehovah Witness, with PMH of TIA, CAD s/p PCI, Afib on Eliquis (last dose 7/7 evening), PPM, COPD/Emphysema, Renal atrophy, HLD, HTN, T2DM, Anemia who presents for LHC. Recently seen by Dr. Bravo due to LE edema - venous doppler negative for DVT. Amlodipine decreased to 5 mg daily. She reports of QUEZADA with mild exertion (<1/2 block onset), intermittently dizziness and leg weakness with walking. She reports of sharp pain in chest at times which resolves on own. Referred for C today due to persistence of symptoms.   Of note, she is currently being treated for a UTI on Augmentin but is unsure how many doses she has remaining.  Referring: Dr. Garrett        Subjective/Observations: patient seen and examined.  denies chest pain, dyspena, dizziness, palpitations, N&V, HA      Review of Systems all WNL except below indicated:    Constitutional: [ ] Fever [ ] Chills [ ] Fatigue [ ] Weight change   HEENT: [ ] Blurred vision [ ] Eye Pain [ ] Headache [ ] Runny nose [ ] Sore Throat   Respiratory: [ ] Cough [ ] Wheezing [ ] Shortness of breath  Cardiovascular: [ ] Chest Pain [ ] Palpitations [ ] QUEZADA [ ] PND [ ] Orthopnea  Gastrointestinal: [ ] Abdominal Pain [ ] Diarrhea [ ] Constipation [ ] Hemorrhoids [ ] Nausea [ ] Vomiting  Genitourinary: [ ] Nocturia [ ] Dysuria [ ] Incontinence  Extremities: [ ] Swelling [ ] Joint Pain  Neurologic: [ ] Focal deficit [ ] Paresthesias [ ] Syncope  Lymphatic: [ ] Swelling [ ] Lymphadenopathy   Skin: [ ] Rash [ ] Ecchymoses [ ] Wounds [ ] Lesions  Psychiatry: [ ] Depression [ ] Suicidal/Homicidal Ideation [ ] Anxiety [ ] Sleep Disturbances  [x ] 10 point review of systems is otherwise negative except as mentioned above            [ ]Unable to obtain    Objective:  Vital Signs Last 24 Hrs  T(C): 36.6 (12 Jul 2025 11:00), Max: 36.9 (11 Jul 2025 17:00)  T(F): 97.9 (12 Jul 2025 11:00), Max: 98.4 (11 Jul 2025 17:00)  HR: 60 (12 Jul 2025 11:00) (60 - 60)  BP: 134/60 (12 Jul 2025 08:00) (134/60 - 176/81)  BP(mean): 116 (12 Jul 2025 05:15) (91 - 116)  RR: 17 (12 Jul 2025 11:00) (16 - 18)  SpO2: 98% (12 Jul 2025 11:00) (97% - 100%)    Parameters below as of 12 Jul 2025 11:00  Patient On (Oxygen Delivery Method): room air        07-11-25 @ 07:01  -  07-12-25 @ 07:00  --------------------------------------------------------  IN: 0 mL / OUT: 1200 mL / NET: -1200 mL    07-12-25 @ 07:01  -  07-12-25 @ 11:59  --------------------------------------------------------  IN: 360 mL / OUT: 0 mL / NET: 360 mL        PAST MEDICAL & SURGICAL HISTORY:  Atrial Fibrillation      DM (Diabetes Mellitus)      HTN (Hypertension)      Hypercholesteremia      Bradycardia      CAD (coronary artery disease)      Kidney stones      History of COPD      Anemia      Cardiac Pacemaker      S/P Tonsillectomy      History of Oophorectomy      Status Post Hernia Repair      S/P Cataract Surgery      Breast Cyst  L breast cyst removed.      Stented coronary artery          SOCIAL HISTORY:  No tobacco, ethanol, or drug abuse.    FAMILY HISTORY:  Family history of hypertension (Father, Mother, Sibling, Aunt)      No family history of acute MI or sudden cardiac death.    MEDICATIONS  (STANDING):  aMIOdarone    Tablet 200 milliGRAM(s) Oral daily  amLODIPine   Tablet 5 milliGRAM(s) Oral daily  amoxicillin  875 milliGRAM(s)/clavulanate 1 Tablet(s) Oral two times a day  aspirin  chewable 81 milliGRAM(s) Oral daily  atorvastatin 40 milliGRAM(s) Oral at bedtime  clopidogrel Tablet 75 milliGRAM(s) Oral daily  dextrose 5%. 1000 milliLiter(s) (50 mL/Hr) IV Continuous <Continuous>  dextrose 5%. 1000 milliLiter(s) (100 mL/Hr) IV Continuous <Continuous>  dextrose 50% Injectable 25 Gram(s) IV Push once  dextrose 50% Injectable 12.5 Gram(s) IV Push once  dextrose 50% Injectable 25 Gram(s) IV Push once  glucagon  Injectable 1 milliGRAM(s) IntraMuscular once  insulin glargine Injectable (LANTUS) 9 Unit(s) SubCutaneous at bedtime  insulin lispro (ADMELOG) corrective regimen sliding scale   SubCutaneous three times a day before meals  metoprolol succinate  milliGRAM(s) Oral daily  metoprolol succinate ER 50 milliGRAM(s) Oral at bedtime  sodium chloride 0.9%. 1000 milliLiter(s) (75 mL/Hr) IV Continuous <Continuous>  valsartan 320 milliGRAM(s) Oral daily    MEDICATIONS  (PRN):  dextrose Oral Gel 15 Gram(s) Oral once PRN Blood Glucose LESS THAN 70 milliGRAM(s)/deciliter      Allergies    penicillin (Unknown)    Intolerances                                9.2    6.24  )-----------( 180      ( 12 Jul 2025 06:25 )             29.5     07-11    135  |  104  |  22  ----------------------------<  297[H]  4.5   |  19[L]  |  1.14    Ca    8.8      11 Jul 2025 22:57  Mg     1.8     07-11        Urinalysis Basic - ( 11 Jul 2025 22:57 )    Color: x / Appearance: x / SG: x / pH: x  Gluc: 297 mg/dL / Ketone: x  / Bili: x / Urobili: x   Blood: x / Protein: x / Nitrite: x   Leuk Esterase: x / RBC: x / WBC x   Sq Epi: x / Non Sq Epi: x / Bacteria: x      Physical Exam:  No apparent distress, alert and oriented times three, appropriate affect  JVD is not elevated, supple  Clear to auscultation with no wheezing, ronchi or crackles  Regular rate and rhythm with no murmur, rub or gallop  Positive bowel sounds, soft, non-tender, non-distended, no masses/guarding or rebound tenderness  RIGHT groin w/o bleeding or hematoma.  soft, non tender, + ecchymosis. Pulses in the (right) lower extremity are (palpable +2).   Denies chest pain, denies groin/leg/foot: pain, numbness or tingling         < from: Cardiac Catheterization (07.11.25 @ 16:12) >  Procedures Performed   Procedures:               1.    Diagnostic Coronary Angiography     2.    Ultrasound Guided Access   3.    Arterial Access - Right Femoral   4.    PCI: NAVEEN   5.    AngioSeal     Indications:                ACS greater than 24 hours   Lab Visit Indication:      ACS greater than 24 hours   PCI Status:                elective     Conclusions:   Successful PCI with NAVEEN to the proximal LAD . Old stents are patent.  Triple therapy for 1 week and then drop ASA    Coronary Angiography   Left Coronary System:   A catheter was positioned into the vessel ostia under fluoroscopic  guidance. Angiograms were obtained in multiple views.  Right Coronary System:   A catheter was positioned into the vessel ostia under fluoroscopic  guidance. Angiograms were obtained in multiple views.    Diagnostic Findings:     Coronary Angiography   The coronary circulation is right dominant.      LM   Left main artery: Angiography shows mild atherosclerosis.      LAD   Proximal left anterior descending: There is an 80 % stenosis.      Patient: COMPA ROJAS          MRN: [id number]  Study Date: 07/11/2025   04:12 PM      Page 1 of 4          CX   Circumflex: Angiography shows no disease.      RCA   Mid right coronary artery: There is a 30 % stenosis.        < end of copied text >  < from: TTE W or WO Ultrasound Enhancing Agent (07.20.23 @ 07:51) >  CONCLUSIONS:      1. Normal left ventricular cavity size. The left ventricular wall thickness is normal. The left ventricular systolic function is normal with an ejection fraction of 56 % by Duffy's method of disks. There are no regional wall motion abnormalities seen.   2. The left ventricular diastolic function is indeterminate.   3. Normal right ventricular cavity size, normal right ventricular wall thickness and probably normal right ventricular systolic function. The tricuspid annular plane systolic excursion (TAPSE) is 2.6 cm (normal >=1.7 cm).   4. No pericardial effusion seen.   5. Compared to the transthoracic echocardiogram performed on 5/10/2023 there have been no significant interval changes.    < end of copied text >          < from: VA Duplex Low Ext Arterial, Ltd, Right (07.11.25 @ 23:50) >  ACC: [id number] EXAM:  DUPLEX LOW ARTERIES UNI LTD RT   ORDERED BY: BARON MOSES     PROCEDURE DATE:  07/11/2025          INTERPRETATION:  CLINICAL INFORMATION: Evaluate for pseudoaneurysm    COMPARISON: None available.    TECHNIQUE: Grayscale, color and spectral Doppler imaging was performed of   the RIGHT lower extremity arteries.    FINDINGS:    RIGHT distal external iliac artery, proximal deep femoral artery, and   proximal superficial femoral artery are patent without significant   stenosis.  The right common femoral artery exhibits a velocity of 136 cm/s.    RIGHT common femoral vein is patent with venous waveform.    No evidence of RIGHT groin pseudoaneurysm or AVF.    There is a complex multiloculated subcutaneous collection without   internal flow suspicious for a hematoma. This collection measures   approximately 3.8 x 3.0 x 1.4 cm.      IMPRESSION:    No sonographic evidence of RIGHT pseudoaneurysm.  Multiloculated subcutaneous hematoma measuring up to 3.8 cm.  Elevated velocity of the common femoral artery up to 136 cm/s.    < end of copied text >

## 2025-07-12 NOTE — PROGRESS NOTE ADULT - SUBJECTIVE AND OBJECTIVE BOX
[facility] INVASIVE CARDIOLOGY- (Nidia, Kimberlee, Usama, Yoshi, Mildred, Fernanda, Michael, Manav, Eze)   CARDIAC CATH LAB, ACP TEAM   [phone]    CHIEF COMPLAINT: Patient is a 84y old female who presents with a chief complaint of chest pain    HPI: 84 year F, Jehovah Witness, with PMH of TIA, CAD s/p PCI, Afib on Eliquis (last dose  evening), PPM, COPD/Emphysema, Renal atrophy, HLD, HTN, T2DM, Anemia who presents for LHC. Recently seen by Dr. Bravo due to LE edema - venous doppler negative for DVT. Amlodipine decreased to 5 mg daily. She reports of QUEZADA with mild exertion (<1/2 block onset), intermittently dizziness and leg weakness with walking. She reports of sharp pain in chest at times which resolves on own. Referred for LHC today due to persistence of symptoms.   Of note, she is currently being treated for a UTI on Augmentin but is unsure how many doses she has remaining.  Referring: Dr. Garrett     Subjective/Observations: Patient seen and examined.  Denies chest pain, dyspena, dizziness, palpitations, N&V, HA, upper/lower extremity pain, numbness/tingling    Review of Systems all WNL except below indicated:    Constitutional: [ ] Fever [ ] Chills [ ] Fatigue [ ] Weight change   HEENT: [ ] Blurred vision [ ] Eye Pain [ ] Headache [ ] Runny nose [ ] Sore Throat   Respiratory: [ ] Cough [ ] Wheezing [ ] Shortness of breath  Cardiovascular: [ ] Chest Pain [ ] Palpitations [ ] QUEZADA [ ] PND [ ] Orthopnea  Gastrointestinal: [ ] Abdominal Pain [ ] Diarrhea [ ] Constipation [ ] Hemorrhoids [ ] Nausea [ ] Vomiting  Genitourinary: [ ] Nocturia [ ] Dysuria [ ] Incontinence  Extremities: [ ] Swelling [ ] Joint Pain  Neurologic: [ ] Focal deficit [ ] Paresthesias [ ] Syncope  Lymphatic: [ ] Swelling [ ] Lymphadenopathy   Skin: [ ] Rash [ ] Ecchymoses [ ] Wounds [ ] Lesions  Psychiatry: [ ] Depression [ ] Suicidal/Homicidal Ideation [ ] Anxiety [ ] Sleep Disturbances  [ ] 10 point review of systems is otherwise negative except as mentioned above            [ ]Unable to obtain    PAST MEDICAL & SURGICAL HISTORY:  Atrial Fibrillation    DM (Diabetes Mellitus)    HTN (Hypertension)    Hypercholesteremia    Bradycardia    CAD (coronary artery disease)    Kidney stones    History of COPD    Anemia    Cardiac Pacemaker    S/P Tonsillectomy    History of Oophorectomy    Status Post Hernia Repair    S/P Cataract Surgery    Breast Cyst  L breast cyst removed.    Stented coronary artery    MEDICATIONS  (STANDING):  aMIOdarone    Tablet 200 milliGRAM(s) Oral daily  amLODIPine   Tablet 5 milliGRAM(s) Oral daily  amoxicillin  875 milliGRAM(s)/clavulanate 1 Tablet(s) Oral two times a day  aspirin  chewable 81 milliGRAM(s) Oral daily  atorvastatin 40 milliGRAM(s) Oral at bedtime  clopidogrel Tablet 75 milliGRAM(s) Oral daily  glucagon  Injectable 1 milliGRAM(s) IntraMuscular once  insulin glargine Injectable (LANTUS) 9 Unit(s) SubCutaneous at bedtime  insulin lispro (ADMELOG) corrective regimen sliding scale   SubCutaneous three times a day before meals  metoprolol succinate  milliGRAM(s) Oral daily  metoprolol succinate ER 50 milliGRAM(s) Oral at bedtime  valsartan 320 milliGRAM(s) Oral daily    MEDICATIONS  (PRN):  dextrose Oral Gel 15 Gram(s) Oral once PRN Blood Glucose LESS THAN 70 milliGRAM(s)/deciliter    Allergies    penicillin (Unknown)    Intolerances    Vital Signs Last 24 Hrs  T(C): 36.9 (2025 05:15), Max: 36.9 (2025 17:00)  T(F): 98.4 (2025 05:15), Max: 98.4 (2025 17:00)  HR: 60 (2025 05:15) (60 - 60)  BP: 176/81 (2025 05:15) (140/63 - 176/81)  BP(mean): 116 (2025 05:15) (91 - 116)  RR: 18 (2025 05:15) (16 - 18)  SpO2: 100% (2025 05:15) (99% - 100%)    Parameters below as of 2025 05:15  Patient On (Oxygen Delivery Method): room air    I&O's Summary    2025 07:01  -  2025 05:34  --------------------------------------------------------  IN: 0 mL / OUT: 1200 mL / NET: -1200 mL      Weight (kg): 70.8 ( @ 13:05)    FOCUSED PHYSICAL EXAM:  Pulmonary: Non-labored, breath sounds are clear bilaterally, No wheezing, rales or rhonchi  Cardiovascular: Regular, S1 and S2, No murmurs, rubs, gallops or clicks  Cath site: Right groin stable w/o bleeding or hematoma, soft, + pulses     LABS: All Labs Reviewed:                        8.9    9.41  )-----------( 178      ( 2025 22:57 )             28.1                         10.1   5.72  )-----------( 207      ( 2025 13:26 )             32.0     2025 22:57    135    |  104    |  22     ----------------------------<  297    4.5     |  19     |  1.14   2025 13:26    136    |  100    |  26     ----------------------------<  218    4.2     |  20     |  1.35     Ca    8.8        2025 22:57  Ca    9.9        2025 13:26  Mg     1.8       2025 22:57    RESULTS:    CATH REPORT:  Study Date:     2025   Name:           COMPA ROJAS   :            1940   (84 years)   Gender:         female   MR#:            [id number]   [facility]#:           [id number]   Patient Class:  Inpatient     Cath Lab Report    Diagnostic Cardiologist:       Shaun Forrester MD   Interventional Cardiologist:   Shaun Forrester MD   Referring Physician:           Arnol Garrett MD     Procedures Performed   Procedures:               1.    Diagnostic Coronary Angiography     2.    Ultrasound Guided Access   3.    Arterial Access - Right Femoral   4.    PCI: NAVEEN   5.    AngioSeal     Indications:                ACS greater than 24 hours   Lab Visit Indication:      ACS greater than 24 hours   PCI Status:                elective     Conclusions:   Successful PCI with NAVEEN to the proximal LAD . Old stents are patent.  Triple therapy for 1 week and then drop ASA

## 2025-07-12 NOTE — PROVIDER CONTACT NOTE (OTHER) - RECOMMENDATIONS
11/18/2022  Ryan Crane is a 56 y.o., male.      Pre-op Assessment    I have reviewed the Patient Summary Reports.     I have reviewed the Nursing Notes. I have reviewed the NPO Status.   I have reviewed the Medications.     Review of Systems  Anesthesia Hx:  No problems with previous Anesthesia Denies Hx of Anesthetic complications    Social:  Non-Smoker    Cardiovascular:   Denies Hypertension.  Denies MI.  Denies CAD.    Denies CABG/stent.   Denies Angina.    Pulmonary:   Denies COPD.  Denies Asthma.  Denies Recent URI.    Renal/:   Denies Chronic Renal Disease.     Hepatic/GI:   Denies GERD. Denies Liver Disease.    Neurological:   Denies TIA. Denies CVA. Denies Seizures.    Endocrine:   Denies Diabetes. Denies Hypothyroidism.    Psych:   Denies Psychiatric History.          Physical Exam  General: Well nourished, Cooperative, Alert and Oriented    Airway:  Mallampati: II / II  Mouth Opening: Normal  TM Distance: 4 - 6 cm  Tongue: Normal    Dental:  Intact    Chest/Lungs:  Clear to auscultation, Normal Respiratory Rate    Heart:  Rate: Normal  Rhythm: Regular Rhythm  Sounds: Normal        Anesthesia Plan  Type of Anesthesia, risks & benefits discussed:    Anesthesia Type: Gen Natural Airway  Intra-op Monitoring Plan: Standard ASA Monitors  Induction:  IV  Informed Consent: Informed consent signed with the Patient and all parties understand the risks and agree with anesthesia plan.  All questions answered.   ASA Score: 3    Ready For Surgery From Anesthesia Perspective.     .      
IVF admin

## 2025-07-14 ENCOUNTER — EMERGENCY (EMERGENCY)
Facility: HOSPITAL | Age: 85
LOS: 1 days | End: 2025-07-14
Attending: EMERGENCY MEDICINE
Payer: MEDICARE

## 2025-07-14 VITALS
OXYGEN SATURATION: 96 % | RESPIRATION RATE: 20 BRPM | DIASTOLIC BLOOD PRESSURE: 61 MMHG | HEIGHT: 66 IN | SYSTOLIC BLOOD PRESSURE: 131 MMHG | WEIGHT: 154.98 LBS | TEMPERATURE: 98 F | HEART RATE: 60 BPM

## 2025-07-14 VITALS
OXYGEN SATURATION: 98 % | DIASTOLIC BLOOD PRESSURE: 64 MMHG | SYSTOLIC BLOOD PRESSURE: 153 MMHG | HEART RATE: 60 BPM | RESPIRATION RATE: 18 BRPM | TEMPERATURE: 98 F

## 2025-07-14 DIAGNOSIS — Z95.5 PRESENCE OF CORONARY ANGIOPLASTY IMPLANT AND GRAFT: Chronic | ICD-10-CM

## 2025-07-14 LAB
ALBUMIN SERPL ELPH-MCNC: 4.2 G/DL — SIGNIFICANT CHANGE UP (ref 3.3–5)
ALP SERPL-CCNC: 68 U/L — SIGNIFICANT CHANGE UP (ref 40–120)
ALT FLD-CCNC: 17 U/L — SIGNIFICANT CHANGE UP (ref 10–45)
ANION GAP SERPL CALC-SCNC: 14 MMOL/L — SIGNIFICANT CHANGE UP (ref 5–17)
AST SERPL-CCNC: 18 U/L — SIGNIFICANT CHANGE UP (ref 10–40)
BASOPHILS # BLD AUTO: 0.07 K/UL — SIGNIFICANT CHANGE UP (ref 0–0.2)
BASOPHILS NFR BLD AUTO: 1 % — SIGNIFICANT CHANGE UP (ref 0–2)
BILIRUB SERPL-MCNC: 0.7 MG/DL — SIGNIFICANT CHANGE UP (ref 0.2–1.2)
BUN SERPL-MCNC: 21 MG/DL — SIGNIFICANT CHANGE UP (ref 7–23)
CALCIUM SERPL-MCNC: 9.3 MG/DL — SIGNIFICANT CHANGE UP (ref 8.4–10.5)
CHLORIDE SERPL-SCNC: 104 MMOL/L — SIGNIFICANT CHANGE UP (ref 96–108)
CO2 SERPL-SCNC: 18 MMOL/L — LOW (ref 22–31)
CREAT SERPL-MCNC: 1.22 MG/DL — SIGNIFICANT CHANGE UP (ref 0.5–1.3)
EGFR: 44 ML/MIN/1.73M2 — LOW
EGFR: 44 ML/MIN/1.73M2 — LOW
EOSINOPHIL # BLD AUTO: 0.15 K/UL — SIGNIFICANT CHANGE UP (ref 0–0.5)
EOSINOPHIL NFR BLD AUTO: 2.1 % — SIGNIFICANT CHANGE UP (ref 0–6)
GLUCOSE SERPL-MCNC: 241 MG/DL — HIGH (ref 70–99)
HCT VFR BLD CALC: 26 % — LOW (ref 34.5–45)
HGB BLD-MCNC: 8.1 G/DL — LOW (ref 11.5–15.5)
IMM GRANULOCYTES # BLD AUTO: 0.03 K/UL — SIGNIFICANT CHANGE UP (ref 0–0.07)
IMM GRANULOCYTES NFR BLD AUTO: 0.4 % — SIGNIFICANT CHANGE UP (ref 0–0.9)
LYMPHOCYTES # BLD AUTO: 1.83 K/UL — SIGNIFICANT CHANGE UP (ref 1–3.3)
LYMPHOCYTES NFR BLD AUTO: 25.7 % — SIGNIFICANT CHANGE UP (ref 13–44)
MCHC RBC-ENTMCNC: 24.8 PG — LOW (ref 27–34)
MCHC RBC-ENTMCNC: 31.2 G/DL — LOW (ref 32–36)
MCV RBC AUTO: 79.8 FL — LOW (ref 80–100)
MONOCYTES # BLD AUTO: 0.75 K/UL — SIGNIFICANT CHANGE UP (ref 0–0.9)
MONOCYTES NFR BLD AUTO: 10.5 % — SIGNIFICANT CHANGE UP (ref 2–14)
NEUTROPHILS # BLD AUTO: 4.29 K/UL — SIGNIFICANT CHANGE UP (ref 1.8–7.4)
NEUTROPHILS NFR BLD AUTO: 60.3 % — SIGNIFICANT CHANGE UP (ref 43–77)
NRBC # BLD AUTO: 0 K/UL — SIGNIFICANT CHANGE UP (ref 0–0)
NRBC # FLD: 0 K/UL — SIGNIFICANT CHANGE UP (ref 0–0)
NRBC BLD AUTO-RTO: 0 /100 WBCS — SIGNIFICANT CHANGE UP (ref 0–0)
PLATELET # BLD AUTO: 198 K/UL — SIGNIFICANT CHANGE UP (ref 150–400)
PMV BLD: 11.7 FL — SIGNIFICANT CHANGE UP (ref 7–13)
POTASSIUM SERPL-MCNC: 3.8 MMOL/L — SIGNIFICANT CHANGE UP (ref 3.5–5.3)
POTASSIUM SERPL-SCNC: 3.8 MMOL/L — SIGNIFICANT CHANGE UP (ref 3.5–5.3)
PROT SERPL-MCNC: 7 G/DL — SIGNIFICANT CHANGE UP (ref 6–8.3)
RBC # BLD: 3.26 M/UL — LOW (ref 3.8–5.2)
RBC # FLD: 17.2 % — HIGH (ref 10.3–14.5)
SODIUM SERPL-SCNC: 136 MMOL/L — SIGNIFICANT CHANGE UP (ref 135–145)
WBC # BLD: 7.12 K/UL — SIGNIFICANT CHANGE UP (ref 3.8–10.5)
WBC # FLD AUTO: 7.12 K/UL — SIGNIFICANT CHANGE UP (ref 3.8–10.5)

## 2025-07-14 PROCEDURE — 80053 COMPREHEN METABOLIC PANEL: CPT

## 2025-07-14 PROCEDURE — 93926 LOWER EXTREMITY STUDY: CPT | Mod: 26,RT

## 2025-07-14 PROCEDURE — 85025 COMPLETE CBC W/AUTO DIFF WBC: CPT

## 2025-07-14 PROCEDURE — 99284 EMERGENCY DEPT VISIT MOD MDM: CPT

## 2025-07-14 PROCEDURE — 93926 LOWER EXTREMITY STUDY: CPT

## 2025-07-14 PROCEDURE — 36000 PLACE NEEDLE IN VEIN: CPT

## 2025-07-14 PROCEDURE — 99284 EMERGENCY DEPT VISIT MOD MDM: CPT | Mod: 25

## 2025-07-14 NOTE — ED PROVIDER NOTE - NSFOLLOWUPINSTRUCTIONS_ED_ALL_ED_FT
You were seen and evaluated in the emergency department for pain at the catheterization site.  The ultrasound showed the previously identified hematoma.  No other findings requiring further intervention.  Lab work was normal and is included in this packet.    Continue to take 500 to 1000 mg of Tylenol every 6 hours as needed for pain.  Please do not exceed more than 4000 mg in 24 hours.    Follow-up with your cardiologist at your postop appointment.      Please return to the emergency department for worsening pain, swelling of the leg, numbness or tingling in the legs, weakness in the legs, chest pain, difficulty breathing, fevers greater than 100.4, or any concerns.

## 2025-07-14 NOTE — ED PROVIDER NOTE - PROGRESS NOTE DETAILS
Kristine Morales PGY-1:  US showing inguinal hematoma was again identified measuring approximately 4.6 x 4.3 x 0.9 cm similar in   appearance to 07/11/2025. no pseudoaneurysm

## 2025-07-14 NOTE — ED PROVIDER NOTE - PHYSICAL EXAMINATION
GENERAL APPEARANCE:  AxOx4, generally well-appearing F, no acute distress.   HEENT:  NC, AT. MMM. EOMI, clear conjunctiva  NECK:  Supple.  No stiffness or restricted ROM.   HEART:  Normal rate and regular rhythm, normal S1/S1, no m/r/g   LUNGS:  CTAB, moving air well. No crackles or wheezes are heard.   ABDOMEN:  Soft, nontender, nondistended  EXTREMITIES:  R groin with significant ecchymosis extending to upper thigh, small area with firmness and ttp but no thrill, pedal pulses intact  NEUROLOGICAL:  Grossly nonfocal. Alert and oriented, moving all 4 extremities. CN not formally tested but appear grossly intact.   SKIN:  Warm and dry without any rash. ecchymosis over R groin

## 2025-07-14 NOTE — ED ADULT TRIAGE NOTE - SOURCE OF INFORMATION
PHYSICAL THERAPY EVALUATION/TREATMENT     03/25/22 1135   Note Type   Note type Evaluation   Pain Assessment   Pain Assessment Tool 0-10   Pain Score 7  (LS area)   Restrictions/Precautions   Other Precautions Chair Alarm; Bed Alarm; Fall Risk;Pain   Home Living   Type of Home Apartment   Home Layout One level;Elevator   [address] [place] [address] chair   Home Equipment Walker;Cane;Wheelchair-electric  (hover round chair)   Additional Comments patient using electric chair in home and not walking, only transfering from chair to toilet with a few step using a grab bar   Prior Function   Lives With Alone   ADL Assistance Independent   IADLs Needs assistance   Comments patient states declining function in recent past and getting meals delivered  General   Additional Pertinent History chart reviewed, patient admitted with COPD exacerbation  patient with generalized weakness and gait dysfunction   Family/Caregiver Present No   Cognition   Overall Cognitive Status WFL   Arousal/Participation Cooperative   Attention Within functional limits   Orientation Level Oriented X4   Following Commands Follows multistep commands with increased time or repetition   Comments patient distracted at times with "extreme fatigue and LS pain"   Subjective   Subjective patient states feeling weak and fatigued   RLE Assessment   RLE Assessment   (ROm WLF, strength 3+/5)   LLE Assessment   LLE Assessment   (ROM WFL strength 3+/5)   Coordination   Movements are Fluid and Coordinated 0   Bed Mobility   Supine to Sit 5  Supervision   Sit to Supine 5  Supervision   Transfers   Sit to Stand 5  Supervision   Stand to Sit 5  Supervision   Ambulation/Elevation   Gait Assistance 4  Minimal assist   Additional items Assist x 1;Verbal cues; Tactile cues   Assistive Device Rolling walker   Distance 10 feet at the bedside and patient impulsively returning to supine due to fatigue   Balance   Static Sitting Fair +   Dynamic Sitting Fair   Static Standing Fair   Dynamic Standing [address] [place] [address],Floors 3,4, & 5 -   Activity Tolerance   Activity Tolerance Patient limited by fatigue;Patient limited by pain   Nurse Made Aware yes   Assessment   Prognosis Good   Problem List Decreased strength;Decreased range of motion;Decreased endurance; Impaired balance;Decreased mobility; Decreased coordination;Pain   Assessment Patient seen for Physical Therapy evaluation  Patient admitted with COPD with exacerbation ([facility] [address] )  Comorbidities affecting patient's physical performance include:Afib, Bipolar disorder, CAD, CHF, COPD, diabetes and hypertension     Personal factors affecting patient at time of initial evaluation include: ambulating with assistive device, inability to ambulate household distances, inability to navigate community distances, inability to navigate level surfaces without external assistance, inability to perform dynamic tasks in community, inability to perform physical activity, inability to perform ADLS and inability to perform IADLS   Prior to admission, patient was independent with functional mobility with electric chair, independent with ADLS and requiring assist for IADLS  Please find objective findings from Physical Therapy assessment regarding body systems outlined above with impairments and limitations including weakness, decreased ROM, impaired balance, decreased endurance, impaired coordination, gait deviations, pain, decreased activity tolerance, decreased functional mobility tolerance and fall risk  The Barthel Index was used as a functional outcome tool presenting with a score of Barthel Index Score: 50 today indicating marked limitations of functional mobility and ADLS  Patient's clinical presentation is currently unstable/unpredictable as seen in patient's presentation of vital sign response, changing level of pain, increased fall risk, new onset of impairment of functional mobility, decreased endurance and new onset of weakness   Pt would benefit from continued Physical Therapy treatment to address deficits as defined above and maximize level of functional mobility  As demonstrated by objective findings, the assigned level of complexity for this evaluation is high  The patient's AM-PAC Basic Mobility Inpatient Short Form Raw Score is 19  A Raw score of greater than 16 suggests the patient may benefit from discharge to home  Please also refer to the recommendation of the Physical Therapist for safe discharge planning  Goals   Patient Goals to decrease pain, get stronger   STG Expiration Date 04/01/22   Short Term Goal #1 transfers and gait with roller walker with supervision   Short Term Goal #2 gait endurance to functional household distances, strength BLEs 3+/4-   LTG Expiration Date 04/08/22   Long Term Goal #1 transfers and gait with roller walker independently   Long Term Goal #2     Plan   Treatment/Interventions ADL retraining;Functional transfer training;LE strengthening/ROM; Therapeutic exercise; Endurance training;Patient/family training;Equipment eval/education; Bed mobility;Gait training; Compensatory technique education   PT Frequency Other (Comment)  (5w)   Recommendation   PT Discharge Recommendation Post acute rehabilitation services   AM-Located within [facility] Basic Mobility Inpatient   Turning in Bed Without Bedrails 4   Lying on Back to Sitting on Edge of Flat Bed 4   Moving Bed to Chair 3   Standing Up From Chair 3   Walk in Room 3   Climb 3-5 Stairs 2   Basic Mobility Inpatient Raw Score 19   Basic Mobility Standardized Score 42 48   Highest Level Of Mobility   JH-HLM Goal 6: Walk 10 steps or more   JH-HLM Highest Level of Mobility 6: Walk 10 steps or more   JH-HLM Goal Achieved Yes   Barthel Index   Feeding 10   Bathing 0   Grooming Score 0   Dressing Score 5   Bladder Score 10   Bowels Score 10   Toilet Use Score 5   Transfers (Bed/Chair) Score 10   Mobility (Level Surface) Score 0   Stairs Score 0   Barthel Index Score 50   Additional Treatment Session Patient Start Time 1120   End Time 1135   Treatment Assessment s: patient with pain in LS area O: B LE exercise completed as listed below, sitting balance activity completed A: patient will benefit from continued PT with progression as tolerated  Exercises   Quad Sets Supine;5 reps;Bilateral   Heelslides Supine;10 reps;Bilateral   Hip Flexion Sitting;5 reps;Bilateral;AAROM   Knee AROM Long Arc Quad Sitting;5 reps;AAROM; Bilateral   Ankle Pumps Sitting;10 reps;Bilateral   Balance training  standing sidestepping  for balance and sitting weight shifting in the bed   Licensure   [place] License Number  [id number] [id number]

## 2025-07-14 NOTE — ED ADULT NURSE NOTE - OBJECTIVE STATEMENT
Pt is a 84y F A&O4 PMHx COPD, anemia, kidney stones, CAD, bradycardia with pacemaker, HTN, DM, afib, cardiac catheterization last Friday presenting to the ED with R groin echymosis and swelling worsening today since cath. Pt denies chest pain, shortness of breath, nausea, vomiting, headache. Pt ambulates independently. placed in gown, side rails up for safety, bed in lowest position, call bell within reach, patient and family educated on plan of care, comfort and safety provided.

## 2025-07-14 NOTE — ED PROVIDER NOTE - CLINICAL SUMMARY MEDICAL DECISION MAKING FREE TEXT BOX
84-year-old female past medical history of TIA, CAD status post 4 stents, A-fib on Eliquis, COPD, hyperlipidemia, hypertension, diabetes with right groin pain and swelling.  Left heart cath on 7/11.  Overnight stay was complicated by right groin hematoma and need for extensive manual compression as well as need for Fem Stop device placement.  Patient was discharged home ambulatory.  Today noticed swelling and a hard , painful lump over the site.  Has not taken her Eliquis since 7/7.  No fevers/chills, chest pain, difficulty breathing, abdominal pain, nausea, vomiting, dysuria/hematuria.    Patient arrives with her son at bedside.  Vitals notable provide /61.  Heart sounds regular, lungs clear to auscultation bilaterally.  Abdomen is soft, nontender, nondistended.  Groin exam chaperoned by Dr. Gibson.  Significant ecchymosis over the right groin and extending down to the upper thigh.  Small area of induration with tenderness to palpation.  No thrill or pulse fell out from a site.  Distal pulses intact.    Will obtain labs and ultrasound of the lower extremity to evaluate for hematoma versus pseudoaneurysm.  Patient denies pain medication at this time.  Dispo pending labs and imaging. Paige: 84-year-old female past medical history of TIA, CAD status post 4 stents, A-fib on Eliquis, COPD, hyperlipidemia, hypertension, diabetes with right groin pain and swelling.  Left heart cath on 7/11.  Overnight stay was complicated by right groin hematoma and need for prolonged manual compression as well as need for Fem Stop device placement.  Patient was discharged home, ambulatory.  Today noticed swelling and a hard , painful lump over the site.  Has not taken her Eliquis since 7/7.  No fevers/chills, chest pain, difficulty breathing, abdominal pain, nausea, vomiting, dysuria/hematuria.    Patient arrives with her son at bedside.  Vitals notable provide /61.  Heart sounds regular, lungs clear to auscultation bilaterally.  Abdomen is soft, nontender, nondistended.  Significant ecchymosis over the right groin and extending down to the upper thigh.  Small area of induration with tenderness to palpation.  No thrill or pulse fell out from a site.  Distal pulses intact.    Will obtain labs and ultrasound of the lower extremity to evaluate for hematoma versus pseudoaneurysm.  Patient denies pain medication at this time.  Dispo pending labs and imaging.

## 2025-07-14 NOTE — ED PROVIDER NOTE - PATIENT PORTAL LINK FT
You can access the FollowMyHealth Patient Portal offered by [facility] by registering at the following website: [email or web address]. By joining [employer]’s FollowMyHealth portal, you will also be able to view your health information using other applications (apps) compatible with our system.

## 2025-07-16 ENCOUNTER — APPOINTMENT (OUTPATIENT)
Dept: INTERNAL MEDICINE | Facility: CLINIC | Age: 85
End: 2025-07-16
Payer: MEDICARE

## 2025-07-16 VITALS
DIASTOLIC BLOOD PRESSURE: 70 MMHG | OXYGEN SATURATION: 99 % | WEIGHT: 158 LBS | TEMPERATURE: 98.1 F | SYSTOLIC BLOOD PRESSURE: 120 MMHG | BODY MASS INDEX: 25.39 KG/M2 | HEART RATE: 65 BPM | HEIGHT: 66 IN

## 2025-07-16 PROCEDURE — 99496 TRANSJ CARE MGMT HIGH F2F 7D: CPT

## 2025-07-16 PROCEDURE — 93000 ELECTROCARDIOGRAM COMPLETE: CPT

## 2025-07-16 RX ORDER — INSULIN DEGLUDEC INJECTION 100 U/ML
100 INJECTION, SOLUTION SUBCUTANEOUS DAILY
Refills: 0 | Status: ACTIVE | COMMUNITY
Start: 2025-07-16

## 2025-07-16 RX ORDER — CLOPIDOGREL BISULFATE 75 MG/1
75 TABLET, FILM COATED ORAL DAILY
Qty: 90 | Refills: 3 | Status: ACTIVE | COMMUNITY
Start: 2025-07-16 | End: 1900-01-01

## 2025-07-17 PROBLEM — R79.89 ELEVATED SERUM CREATININE: Status: ACTIVE | Noted: 2025-07-08

## 2025-07-17 LAB
ALBUMIN SERPL ELPH-MCNC: 4.2 G/DL
ALP BLD-CCNC: 75 U/L
ALT SERPL-CCNC: 18 U/L
ANION GAP SERPL CALC-SCNC: 15 MMOL/L
AST SERPL-CCNC: 21 U/L
BASOPHILS # BLD AUTO: 0.09 K/UL
BASOPHILS # BLD AUTO: 0.09 K/UL
BASOPHILS NFR BLD AUTO: 1.4 %
BASOPHILS NFR BLD AUTO: 1.5 %
BILIRUB SERPL-MCNC: 1 MG/DL
BUN SERPL-MCNC: 20 MG/DL
CALCIUM SERPL-MCNC: 9.6 MG/DL
CHLORIDE SERPL-SCNC: 105 MMOL/L
CO2 SERPL-SCNC: 18 MMOL/L
CREAT SERPL-MCNC: 1.24 MG/DL
EGFRCR SERPLBLD CKD-EPI 2021: 43 ML/MIN/1.73M2
EOSINOPHIL # BLD AUTO: 0.18 K/UL
EOSINOPHIL # BLD AUTO: 0.23 K/UL
EOSINOPHIL NFR BLD AUTO: 2.9 %
EOSINOPHIL NFR BLD AUTO: 3.5 %
FERRITIN SERPL-MCNC: 42 NG/ML
FOLATE SERPL-MCNC: 5.5 NG/ML
GLUCOSE SERPL-MCNC: 165 MG/DL
HCT VFR BLD CALC: 25.8 %
HCT VFR BLD CALC: 26.3 %
HGB BLD-MCNC: 8.1 G/DL
HGB BLD-MCNC: 8.1 G/DL
IMM GRANULOCYTES NFR BLD AUTO: 0.3 %
IMM GRANULOCYTES NFR BLD AUTO: 0.5 %
IRON SATN MFR SERPL: 15 %
IRON SERPL-MCNC: 58 UG/DL
LYMPHOCYTES # BLD AUTO: 1.43 K/UL
LYMPHOCYTES # BLD AUTO: 1.85 K/UL
LYMPHOCYTES NFR BLD AUTO: 23.1 %
LYMPHOCYTES NFR BLD AUTO: 27.9 %
MAN DIFF?: NORMAL
MAN DIFF?: NORMAL
MCHC RBC-ENTMCNC: 25.2 PG
MCHC RBC-ENTMCNC: 25.4 PG
MCHC RBC-ENTMCNC: 30.8 G/DL
MCHC RBC-ENTMCNC: 31.4 G/DL
MCV RBC AUTO: 80.4 FL
MCV RBC AUTO: 82.4 FL
MONOCYTES # BLD AUTO: 0.74 K/UL
MONOCYTES # BLD AUTO: 0.77 K/UL
MONOCYTES NFR BLD AUTO: 11.6 %
MONOCYTES NFR BLD AUTO: 12 %
NEUTROPHILS # BLD AUTO: 3.67 K/UL
NEUTROPHILS # BLD AUTO: 3.72 K/UL
NEUTROPHILS NFR BLD AUTO: 55.3 %
NEUTROPHILS NFR BLD AUTO: 60 %
NT-PROBNP SERPL-MCNC: 1230 PG/ML
NT-PROBNP SERPL-MCNC: 1256 PG/ML
PLATELET # BLD AUTO: 226 K/UL
PLATELET # BLD AUTO: 242 K/UL
POTASSIUM SERPL-SCNC: 4.3 MMOL/L
PROT SERPL-MCNC: 6.8 G/DL
RBC # BLD: 3.19 M/UL
RBC # BLD: 3.21 M/UL
RBC # FLD: 18.3 %
RBC # FLD: 18.4 %
SODIUM SERPL-SCNC: 139 MMOL/L
TIBC SERPL-MCNC: 384 UG/DL
UIBC SERPL-MCNC: 326 UG/DL
VIT B12 SERPL-MCNC: >2000 PG/ML
WBC # FLD AUTO: 6.19 K/UL
WBC # FLD AUTO: 6.63 K/UL

## 2025-07-23 ENCOUNTER — APPOINTMENT (OUTPATIENT)
Dept: INTERNAL MEDICINE | Facility: CLINIC | Age: 85
End: 2025-07-23
Payer: MEDICARE

## 2025-07-23 VITALS
HEART RATE: 60 BPM | WEIGHT: 158 LBS | TEMPERATURE: 98.2 F | OXYGEN SATURATION: 98 % | HEIGHT: 66 IN | BODY MASS INDEX: 25.39 KG/M2 | DIASTOLIC BLOOD PRESSURE: 74 MMHG | SYSTOLIC BLOOD PRESSURE: 140 MMHG

## 2025-07-23 LAB
ANION GAP SERPL CALC-SCNC: 13 MMOL/L
BASOPHILS # BLD AUTO: 0.08 K/UL
BASOPHILS NFR BLD AUTO: 1.4 %
BUN SERPL-MCNC: 18 MG/DL
CALCIUM SERPL-MCNC: 9.9 MG/DL
CHLORIDE SERPL-SCNC: 102 MMOL/L
CO2 SERPL-SCNC: 22 MMOL/L
CREAT SERPL-MCNC: 1.27 MG/DL
EGFRCR SERPLBLD CKD-EPI 2021: 42 ML/MIN/1.73M2
EOSINOPHIL # BLD AUTO: 0.17 K/UL
EOSINOPHIL NFR BLD AUTO: 3 %
GLUCOSE SERPL-MCNC: 141 MG/DL
HCT VFR BLD CALC: 29.3 %
HGB BLD-MCNC: 9.2 G/DL
IMM GRANULOCYTES NFR BLD AUTO: 0.4 %
LYMPHOCYTES # BLD AUTO: 1.67 K/UL
LYMPHOCYTES NFR BLD AUTO: 29.8 %
MAN DIFF?: NORMAL
MCHC RBC-ENTMCNC: 25.6 PG
MCHC RBC-ENTMCNC: 31.4 G/DL
MCV RBC AUTO: 81.6 FL
MONOCYTES # BLD AUTO: 0.66 K/UL
MONOCYTES NFR BLD AUTO: 11.8 %
NEUTROPHILS # BLD AUTO: 3.01 K/UL
NEUTROPHILS NFR BLD AUTO: 53.6 %
NT-PROBNP SERPL-MCNC: 601 PG/ML
PLATELET # BLD AUTO: 318 K/UL
POTASSIUM SERPL-SCNC: 4.7 MMOL/L
RBC # BLD: 3.59 M/UL
RBC # FLD: 19.3 %
SODIUM SERPL-SCNC: 138 MMOL/L
WBC # FLD AUTO: 5.61 K/UL

## 2025-07-23 PROCEDURE — 93000 ELECTROCARDIOGRAM COMPLETE: CPT

## 2025-07-23 PROCEDURE — G2211 COMPLEX E/M VISIT ADD ON: CPT

## 2025-07-23 PROCEDURE — 99214 OFFICE O/P EST MOD 30 MIN: CPT

## 2025-07-23 RX ORDER — ASPIRIN 81 MG
81 TABLET,CHEWABLE ORAL
Qty: 90 | Refills: 1 | Status: DISCONTINUED | COMMUNITY
Start: 2025-07-17 | End: 2025-07-23

## 2025-08-05 ENCOUNTER — APPOINTMENT (OUTPATIENT)
Dept: PULMONOLOGY | Facility: CLINIC | Age: 85
End: 2025-08-05
Payer: MEDICARE

## 2025-08-05 VITALS — SYSTOLIC BLOOD PRESSURE: 126 MMHG | HEART RATE: 64 BPM | DIASTOLIC BLOOD PRESSURE: 57 MMHG | OXYGEN SATURATION: 93 %

## 2025-08-05 DIAGNOSIS — I48.91 UNSPECIFIED ATRIAL FIBRILLATION: ICD-10-CM

## 2025-08-05 DIAGNOSIS — R06.09 OTHER FORMS OF DYSPNEA: ICD-10-CM

## 2025-08-05 LAB — POCT - HEMOGLOBIN (HGB), QUANTITATIVE, TRANSCUTANEOUS: 9.4

## 2025-08-05 PROCEDURE — 99214 OFFICE O/P EST MOD 30 MIN: CPT | Mod: 25

## 2025-08-05 PROCEDURE — 71046 X-RAY EXAM CHEST 2 VIEWS: CPT

## 2025-08-05 RX ORDER — FUROSEMIDE 20 MG/1
20 TABLET ORAL
Qty: 14 | Refills: 0 | Status: ACTIVE | COMMUNITY
Start: 2025-08-05 | End: 1900-01-01

## 2025-08-14 ENCOUNTER — APPOINTMENT (OUTPATIENT)
Dept: CARDIOLOGY | Facility: CLINIC | Age: 85
End: 2025-08-14
Payer: MEDICARE

## 2025-08-14 ENCOUNTER — APPOINTMENT (OUTPATIENT)
Dept: PULMONOLOGY | Facility: CLINIC | Age: 85
End: 2025-08-14
Payer: MEDICARE

## 2025-08-14 VITALS
OXYGEN SATURATION: 98 % | BODY MASS INDEX: 25.55 KG/M2 | TEMPERATURE: 97.5 F | HEART RATE: 60 BPM | WEIGHT: 159 LBS | HEIGHT: 66 IN | DIASTOLIC BLOOD PRESSURE: 80 MMHG | SYSTOLIC BLOOD PRESSURE: 110 MMHG

## 2025-08-14 DIAGNOSIS — E78.5 HYPERLIPIDEMIA, UNSPECIFIED: ICD-10-CM

## 2025-08-14 DIAGNOSIS — R07.9 CHEST PAIN, UNSPECIFIED: ICD-10-CM

## 2025-08-14 DIAGNOSIS — S30.1XXA CONTUSION OF ABDOMINAL WALL, INITIAL ENCOUNTER: ICD-10-CM

## 2025-08-14 DIAGNOSIS — R82.90 UNSPECIFIED ABNORMAL FINDINGS IN URINE: ICD-10-CM

## 2025-08-14 DIAGNOSIS — R06.00 DYSPNEA, UNSPECIFIED: ICD-10-CM

## 2025-08-14 DIAGNOSIS — E11.9 TYPE 2 DIABETES MELLITUS W/OUT COMPLICATIONS: ICD-10-CM

## 2025-08-14 DIAGNOSIS — I73.9 PERIPHERAL VASCULAR DISEASE, UNSPECIFIED: ICD-10-CM

## 2025-08-14 PROCEDURE — 93926 LOWER EXTREMITY STUDY: CPT

## 2025-08-14 PROCEDURE — 99214 OFFICE O/P EST MOD 30 MIN: CPT

## 2025-08-14 PROCEDURE — 93000 ELECTROCARDIOGRAM COMPLETE: CPT

## 2025-08-14 PROCEDURE — G2211 COMPLEX E/M VISIT ADD ON: CPT

## 2025-08-14 PROCEDURE — 71046 X-RAY EXAM CHEST 2 VIEWS: CPT

## 2025-08-14 RX ORDER — FUROSEMIDE 20 MG/1
20 TABLET ORAL
Qty: 30 | Refills: 1 | Status: ACTIVE | COMMUNITY
Start: 2025-08-14 | End: 1900-01-01

## 2025-08-18 LAB
ALBUMIN SERPL ELPH-MCNC: 4.6 G/DL
ALP BLD-CCNC: 94 U/L
ALT SERPL-CCNC: 28 U/L
ANION GAP SERPL CALC-SCNC: 18 MMOL/L
APPEARANCE: CLEAR
AST SERPL-CCNC: 25 U/L
BACTERIA UR CULT: NORMAL
BACTERIA: NEGATIVE /HPF
BASOPHILS # BLD AUTO: 0.1 K/UL
BASOPHILS NFR BLD AUTO: 1.4 %
BILIRUB DIRECT SERPL-MCNC: 0.21 MG/DL
BILIRUB INDIRECT SERPL-MCNC: 0.3 MG/DL
BILIRUB SERPL-MCNC: 0.5 MG/DL
BILIRUBIN URINE: NEGATIVE
BLOOD URINE: ABNORMAL
BUN SERPL-MCNC: 31 MG/DL
CALCIUM SERPL-MCNC: 10.2 MG/DL
CAST: 0 /LPF
CHLORIDE SERPL-SCNC: 97 MMOL/L
CHOLEST SERPL-MCNC: 192 MG/DL
CK SERPL-CCNC: 71 U/L
CO2 SERPL-SCNC: 22 MMOL/L
COLOR: YELLOW
CREAT SERPL-MCNC: 1.45 MG/DL
EGFRCR SERPLBLD CKD-EPI 2021: 36 ML/MIN/1.73M2
EOSINOPHIL # BLD AUTO: 0.2 K/UL
EOSINOPHIL NFR BLD AUTO: 2.7 %
EPITHELIAL CELLS: 0 /HPF
FERRITIN SERPL-MCNC: 35 NG/ML
FOLATE SERPL-MCNC: 6.8 NG/ML
GLUCOSE QUALITATIVE U: >=1000 MG/DL
GLUCOSE SERPL-MCNC: 82 MG/DL
HAPTOGLOB SERPL-MCNC: 97 MG/DL
HCT VFR BLD CALC: 33.9 %
HDLC SERPL-MCNC: 87 MG/DL
HGB BLD-MCNC: 10.3 G/DL
IMM GRANULOCYTES NFR BLD AUTO: 0.3 %
IRON SATN MFR SERPL: 7 %
IRON SERPL-MCNC: 32 UG/DL
KETONES URINE: NEGATIVE MG/DL
LDH SERPL-CCNC: 211 U/L
LDLC SERPL-MCNC: 87 MG/DL
LEUKOCYTE ESTERASE URINE: ABNORMAL
LYMPHOCYTES # BLD AUTO: 2.87 K/UL
LYMPHOCYTES NFR BLD AUTO: 39.1 %
MAGNESIUM SERPL-MCNC: 1.9 MG/DL
MAN DIFF?: NORMAL
MCHC RBC-ENTMCNC: 25.2 PG
MCHC RBC-ENTMCNC: 30.4 G/DL
MCV RBC AUTO: 82.9 FL
MICROSCOPIC-UA: NORMAL
MONOCYTES # BLD AUTO: 0.73 K/UL
MONOCYTES NFR BLD AUTO: 9.9 %
NEUTROPHILS # BLD AUTO: 3.42 K/UL
NEUTROPHILS NFR BLD AUTO: 46.6 %
NITRITE URINE: NEGATIVE
NONHDLC SERPL-MCNC: 106 MG/DL
NT-PROBNP SERPL-MCNC: 601 PG/ML
PH URINE: 6.5
PLATELET # BLD AUTO: 289 K/UL
POTASSIUM SERPL-SCNC: 4.3 MMOL/L
PROT SERPL-MCNC: 7.4 G/DL
PROTEIN URINE: NORMAL MG/DL
RBC # BLD: 4.09 M/UL
RBC # FLD: 17.6 %
RED BLOOD CELLS URINE: 3 /HPF
SODIUM SERPL-SCNC: 137 MMOL/L
SPECIFIC GRAVITY URINE: 1.02
TIBC SERPL-MCNC: 449 UG/DL
TRANSFERRIN SERPL-MCNC: 355 MG/DL
TRIGL SERPL-MCNC: 107 MG/DL
UIBC SERPL-MCNC: 417 UG/DL
UROBILINOGEN URINE: 0.2 MG/DL
VIT B12 SERPL-MCNC: 968 PG/ML
WBC # FLD AUTO: 7.34 K/UL
WHITE BLOOD CELLS URINE: 331 /HPF

## 2025-08-21 ENCOUNTER — APPOINTMENT (OUTPATIENT)
Dept: ELECTROPHYSIOLOGY | Facility: CLINIC | Age: 85
End: 2025-08-21
Payer: MEDICARE

## 2025-08-21 ENCOUNTER — NON-APPOINTMENT (OUTPATIENT)
Age: 85
End: 2025-08-21

## 2025-08-21 VITALS — DIASTOLIC BLOOD PRESSURE: 67 MMHG | SYSTOLIC BLOOD PRESSURE: 114 MMHG | HEART RATE: 60 BPM

## 2025-08-21 DIAGNOSIS — I49.5 SICK SINUS SYNDROME: ICD-10-CM

## 2025-08-21 PROCEDURE — 93280 PM DEVICE PROGR EVAL DUAL: CPT

## 2025-08-22 ENCOUNTER — APPOINTMENT (OUTPATIENT)
Dept: INTERNAL MEDICINE | Facility: CLINIC | Age: 85
End: 2025-08-22
Payer: MEDICARE

## 2025-08-22 ENCOUNTER — APPOINTMENT (OUTPATIENT)
Dept: CARDIOLOGY | Facility: CLINIC | Age: 85
End: 2025-08-22
Payer: MEDICARE

## 2025-08-22 VITALS
HEART RATE: 57 BPM | DIASTOLIC BLOOD PRESSURE: 70 MMHG | WEIGHT: 156 LBS | OXYGEN SATURATION: 95 % | HEIGHT: 66 IN | SYSTOLIC BLOOD PRESSURE: 120 MMHG | BODY MASS INDEX: 25.07 KG/M2

## 2025-08-22 VITALS — DIASTOLIC BLOOD PRESSURE: 70 MMHG | SYSTOLIC BLOOD PRESSURE: 118 MMHG

## 2025-08-22 VITALS — OXYGEN SATURATION: 98 % | HEART RATE: 61 BPM

## 2025-08-22 DIAGNOSIS — I25.10 ATHEROSCLEROTIC HEART DISEASE OF NATIVE CORONARY ARTERY W/OUT ANGINA PECTORIS: ICD-10-CM

## 2025-08-22 DIAGNOSIS — J43.9 EMPHYSEMA, UNSPECIFIED: ICD-10-CM

## 2025-08-22 DIAGNOSIS — J90 PLEURAL EFFUSION, NOT ELSEWHERE CLASSIFIED: ICD-10-CM

## 2025-08-22 DIAGNOSIS — I48.0 PAROXYSMAL ATRIAL FIBRILLATION: ICD-10-CM

## 2025-08-22 DIAGNOSIS — I10 ESSENTIAL (PRIMARY) HYPERTENSION: ICD-10-CM

## 2025-08-22 DIAGNOSIS — R42 DIZZINESS AND GIDDINESS: ICD-10-CM

## 2025-08-22 DIAGNOSIS — R60.0 LOCALIZED EDEMA: ICD-10-CM

## 2025-08-22 PROCEDURE — 93306 TTE W/DOPPLER COMPLETE: CPT

## 2025-08-22 PROCEDURE — G2211 COMPLEX E/M VISIT ADD ON: CPT

## 2025-08-22 PROCEDURE — 93000 ELECTROCARDIOGRAM COMPLETE: CPT

## 2025-08-22 PROCEDURE — 99214 OFFICE O/P EST MOD 30 MIN: CPT

## 2025-08-22 RX ORDER — CEFDINIR 300 MG/1
300 CAPSULE ORAL
Qty: 7 | Refills: 0 | Status: DISCONTINUED | COMMUNITY
Start: 2025-08-15 | End: 2025-08-22

## 2025-08-23 PROBLEM — J90 PLEURAL EFFUSION, BILATERAL: Status: ACTIVE | Noted: 2025-08-22

## 2025-08-23 LAB
ALBUMIN SERPL ELPH-MCNC: 4.7 G/DL
ALP BLD-CCNC: 93 U/L
ALT SERPL-CCNC: 25 U/L
ANION GAP SERPL CALC-SCNC: 17 MMOL/L
AST SERPL-CCNC: 23 U/L
BASOPHILS # BLD AUTO: 0.09 K/UL
BASOPHILS NFR BLD AUTO: 1.3 %
BILIRUB SERPL-MCNC: 0.6 MG/DL
BUN SERPL-MCNC: 26 MG/DL
CALCIUM SERPL-MCNC: 10.2 MG/DL
CHLORIDE SERPL-SCNC: 98 MMOL/L
CO2 SERPL-SCNC: 22 MMOL/L
CREAT SERPL-MCNC: 1.45 MG/DL
EGFRCR SERPLBLD CKD-EPI 2021: 36 ML/MIN/1.73M2
EOSINOPHIL # BLD AUTO: 0.11 K/UL
EOSINOPHIL NFR BLD AUTO: 1.6 %
GLUCOSE SERPL-MCNC: 318 MG/DL
HCT VFR BLD CALC: 34.6 %
HGB BLD-MCNC: 10.7 G/DL
IMM GRANULOCYTES NFR BLD AUTO: 0.3 %
LYMPHOCYTES # BLD AUTO: 2 K/UL
LYMPHOCYTES NFR BLD AUTO: 28.9 %
MAN DIFF?: NORMAL
MCHC RBC-ENTMCNC: 25.2 PG
MCHC RBC-ENTMCNC: 30.9 G/DL
MCV RBC AUTO: 81.6 FL
MONOCYTES # BLD AUTO: 0.63 K/UL
MONOCYTES NFR BLD AUTO: 9.1 %
NEUTROPHILS # BLD AUTO: 4.08 K/UL
NEUTROPHILS NFR BLD AUTO: 58.8 %
NT-PROBNP SERPL-MCNC: 829 PG/ML
PLATELET # BLD AUTO: 298 K/UL
POTASSIUM SERPL-SCNC: 4.5 MMOL/L
PROT SERPL-MCNC: 7.3 G/DL
RBC # BLD: 4.24 M/UL
RBC # FLD: 16.8 %
SODIUM SERPL-SCNC: 137 MMOL/L
WBC # FLD AUTO: 6.93 K/UL

## 2025-08-25 ENCOUNTER — APPOINTMENT (OUTPATIENT)
Dept: INTERNAL MEDICINE | Facility: CLINIC | Age: 85
End: 2025-08-25
Payer: MEDICARE

## 2025-08-25 ENCOUNTER — APPOINTMENT (OUTPATIENT)
Dept: CARDIOLOGY | Facility: CLINIC | Age: 85
End: 2025-08-25

## 2025-08-25 VITALS
TEMPERATURE: 97.7 F | SYSTOLIC BLOOD PRESSURE: 128 MMHG | WEIGHT: 157 LBS | BODY MASS INDEX: 25.23 KG/M2 | OXYGEN SATURATION: 99 % | HEIGHT: 66 IN | DIASTOLIC BLOOD PRESSURE: 70 MMHG | HEART RATE: 61 BPM

## 2025-08-25 DIAGNOSIS — I48.91 UNSPECIFIED ATRIAL FIBRILLATION: ICD-10-CM

## 2025-08-25 DIAGNOSIS — D64.9 ANEMIA, UNSPECIFIED: ICD-10-CM

## 2025-08-25 DIAGNOSIS — Z95.0 PRESENCE OF CARDIAC PACEMAKER: ICD-10-CM

## 2025-08-25 DIAGNOSIS — E11.9 TYPE 2 DIABETES MELLITUS W/OUT COMPLICATIONS: ICD-10-CM

## 2025-08-25 PROCEDURE — G2211 COMPLEX E/M VISIT ADD ON: CPT

## 2025-08-25 PROCEDURE — 93000 ELECTROCARDIOGRAM COMPLETE: CPT

## 2025-08-25 PROCEDURE — 99214 OFFICE O/P EST MOD 30 MIN: CPT

## 2025-08-27 ENCOUNTER — APPOINTMENT (OUTPATIENT)
Dept: INTERNAL MEDICINE | Facility: CLINIC | Age: 85
End: 2025-08-27
Payer: MEDICARE

## 2025-08-27 ENCOUNTER — EMERGENCY (EMERGENCY)
Facility: HOSPITAL | Age: 85
LOS: 1 days | End: 2025-08-27
Attending: EMERGENCY MEDICINE
Payer: MEDICARE

## 2025-08-27 VITALS
TEMPERATURE: 98 F | OXYGEN SATURATION: 97 % | HEART RATE: 60 BPM | SYSTOLIC BLOOD PRESSURE: 146 MMHG | DIASTOLIC BLOOD PRESSURE: 70 MMHG | RESPIRATION RATE: 20 BRPM | HEIGHT: 66 IN | WEIGHT: 149.91 LBS

## 2025-08-27 VITALS
OXYGEN SATURATION: 96 % | SYSTOLIC BLOOD PRESSURE: 120 MMHG | DIASTOLIC BLOOD PRESSURE: 60 MMHG | WEIGHT: 157 LBS | HEART RATE: 81 BPM | BODY MASS INDEX: 25.23 KG/M2 | HEIGHT: 66 IN

## 2025-08-27 DIAGNOSIS — R07.9 CHEST PAIN, UNSPECIFIED: ICD-10-CM

## 2025-08-27 DIAGNOSIS — Z95.5 PRESENCE OF CORONARY ANGIOPLASTY IMPLANT AND GRAFT: Chronic | ICD-10-CM

## 2025-08-27 DIAGNOSIS — R06.09 OTHER FORMS OF DYSPNEA: ICD-10-CM

## 2025-08-27 LAB
ALBUMIN SERPL ELPH-MCNC: 4.1 G/DL — SIGNIFICANT CHANGE UP (ref 3.3–5)
ALBUMIN SERPL ELPH-MCNC: 4.7 G/DL
ALP BLD-CCNC: 86 U/L
ALP SERPL-CCNC: 80 U/L — SIGNIFICANT CHANGE UP (ref 40–120)
ALT FLD-CCNC: 18 U/L — SIGNIFICANT CHANGE UP (ref 10–45)
ALT SERPL-CCNC: 26 U/L
ANION GAP SERPL CALC-SCNC: 19 MMOL/L
ANION GAP SERPL CALC-SCNC: 21 MMOL/L — HIGH (ref 5–17)
ANION GAP SERPL CALC-SCNC: 22 MMOL/L — HIGH (ref 5–17)
APPEARANCE UR: CLEAR — SIGNIFICANT CHANGE UP
AST SERPL-CCNC: 16 U/L — SIGNIFICANT CHANGE UP (ref 10–40)
AST SERPL-CCNC: 24 U/L
BASOPHILS # BLD AUTO: 0.07 K/UL
BASOPHILS # BLD AUTO: 0.08 K/UL — SIGNIFICANT CHANGE UP (ref 0–0.2)
BASOPHILS NFR BLD AUTO: 1.1 %
BASOPHILS NFR BLD AUTO: 1.2 % — SIGNIFICANT CHANGE UP (ref 0–2)
BILIRUB SERPL-MCNC: 0.5 MG/DL — SIGNIFICANT CHANGE UP (ref 0.2–1.2)
BILIRUB SERPL-MCNC: 0.6 MG/DL
BILIRUB UR-MCNC: NEGATIVE — SIGNIFICANT CHANGE UP
BUN SERPL-MCNC: 24 MG/DL
BUN SERPL-MCNC: 34 MG/DL — HIGH (ref 7–23)
BUN SERPL-MCNC: 35 MG/DL — HIGH (ref 7–23)
CALCIUM SERPL-MCNC: 10.3 MG/DL
CALCIUM SERPL-MCNC: 10.3 MG/DL — SIGNIFICANT CHANGE UP (ref 8.4–10.5)
CALCIUM SERPL-MCNC: 10.7 MG/DL — HIGH (ref 8.4–10.5)
CHLORIDE SERPL-SCNC: 100 MMOL/L — SIGNIFICANT CHANGE UP (ref 96–108)
CHLORIDE SERPL-SCNC: 101 MMOL/L
CHLORIDE SERPL-SCNC: 97 MMOL/L — SIGNIFICANT CHANGE UP (ref 96–108)
CO2 SERPL-SCNC: 14 MMOL/L — LOW (ref 22–31)
CO2 SERPL-SCNC: 16 MMOL/L — LOW (ref 22–31)
CO2 SERPL-SCNC: 21 MMOL/L
COLOR SPEC: YELLOW — SIGNIFICANT CHANGE UP
CREAT SERPL-MCNC: 1.44 MG/DL — HIGH (ref 0.5–1.3)
CREAT SERPL-MCNC: 1.49 MG/DL
CREAT SERPL-MCNC: 1.55 MG/DL — HIGH (ref 0.5–1.3)
D DIMER BLD IA.RAPID-MCNC: 563 NG/ML DDU — HIGH
DIFF PNL FLD: NEGATIVE — SIGNIFICANT CHANGE UP
EGFR: 33 ML/MIN/1.73M2 — LOW
EGFR: 33 ML/MIN/1.73M2 — LOW
EGFR: 36 ML/MIN/1.73M2 — LOW
EGFR: 36 ML/MIN/1.73M2 — LOW
EGFRCR SERPLBLD CKD-EPI 2021: 35 ML/MIN/1.73M2
EOSINOPHIL # BLD AUTO: 0.08 K/UL
EOSINOPHIL # BLD AUTO: 0.11 K/UL — SIGNIFICANT CHANGE UP (ref 0–0.5)
EOSINOPHIL NFR BLD AUTO: 1.2 %
EOSINOPHIL NFR BLD AUTO: 1.6 % — SIGNIFICANT CHANGE UP (ref 0–6)
GLUCOSE SERPL-MCNC: 162 MG/DL
GLUCOSE SERPL-MCNC: 216 MG/DL — HIGH (ref 70–99)
GLUCOSE SERPL-MCNC: 97 MG/DL — SIGNIFICANT CHANGE UP (ref 70–99)
GLUCOSE UR QL: 100 MG/DL
HCT VFR BLD CALC: 33.9 % — LOW (ref 34.5–45)
HCT VFR BLD CALC: 34.7 %
HGB BLD-MCNC: 10.5 G/DL — LOW (ref 11.5–15.5)
HGB BLD-MCNC: 10.6 G/DL
IMM GRANULOCYTES # BLD AUTO: 0.03 K/UL — SIGNIFICANT CHANGE UP (ref 0–0.07)
IMM GRANULOCYTES NFR BLD AUTO: 0.3 %
IMM GRANULOCYTES NFR BLD AUTO: 0.4 % — SIGNIFICANT CHANGE UP (ref 0–0.9)
KETONES UR QL: NEGATIVE MG/DL — SIGNIFICANT CHANGE UP
LEUKOCYTE ESTERASE UR-ACNC: NEGATIVE — SIGNIFICANT CHANGE UP
LYMPHOCYTES # BLD AUTO: 1.87 K/UL
LYMPHOCYTES # BLD AUTO: 1.89 K/UL — SIGNIFICANT CHANGE UP (ref 1–3.3)
LYMPHOCYTES NFR BLD AUTO: 27.5 % — SIGNIFICANT CHANGE UP (ref 13–44)
LYMPHOCYTES NFR BLD AUTO: 29 %
MAGNESIUM SERPL-MCNC: 1.7 MG/DL — SIGNIFICANT CHANGE UP (ref 1.6–2.6)
MAN DIFF?: NORMAL
MCHC RBC-ENTMCNC: 25.2 PG — LOW (ref 27–34)
MCHC RBC-ENTMCNC: 25.4 PG
MCHC RBC-ENTMCNC: 30.5 G/DL
MCHC RBC-ENTMCNC: 31 G/DL — LOW (ref 32–36)
MCV RBC AUTO: 81.5 FL — SIGNIFICANT CHANGE UP (ref 80–100)
MCV RBC AUTO: 83 FL
MONOCYTES # BLD AUTO: 0.58 K/UL
MONOCYTES # BLD AUTO: 0.76 K/UL — SIGNIFICANT CHANGE UP (ref 0–0.9)
MONOCYTES NFR BLD AUTO: 11 % — SIGNIFICANT CHANGE UP (ref 2–14)
MONOCYTES NFR BLD AUTO: 9 %
NEUTROPHILS # BLD AUTO: 3.83 K/UL
NEUTROPHILS # BLD AUTO: 4.01 K/UL — SIGNIFICANT CHANGE UP (ref 1.8–7.4)
NEUTROPHILS NFR BLD AUTO: 58.3 % — SIGNIFICANT CHANGE UP (ref 43–77)
NEUTROPHILS NFR BLD AUTO: 59.4 %
NITRITE UR-MCNC: NEGATIVE — SIGNIFICANT CHANGE UP
NRBC # BLD AUTO: 0 K/UL — SIGNIFICANT CHANGE UP (ref 0–0)
NRBC # FLD: 0 K/UL — SIGNIFICANT CHANGE UP (ref 0–0)
NRBC BLD AUTO-RTO: 0 /100 WBCS — SIGNIFICANT CHANGE UP (ref 0–0)
NT-PROBNP SERPL-MCNC: 841 PG/ML
NT-PROBNP SERPL-SCNC: 2326 PG/ML — HIGH (ref 0–300)
PH UR: 5.5 — SIGNIFICANT CHANGE UP (ref 5–8)
PHOSPHATE SERPL-MCNC: 3.5 MG/DL — SIGNIFICANT CHANGE UP (ref 2.5–4.5)
PLATELET # BLD AUTO: 254 K/UL — SIGNIFICANT CHANGE UP (ref 150–400)
PLATELET # BLD AUTO: 280 K/UL
PMV BLD: 11.1 FL — SIGNIFICANT CHANGE UP (ref 7–13)
POTASSIUM SERPL-MCNC: 4.1 MMOL/L — SIGNIFICANT CHANGE UP (ref 3.5–5.3)
POTASSIUM SERPL-MCNC: 4.1 MMOL/L — SIGNIFICANT CHANGE UP (ref 3.5–5.3)
POTASSIUM SERPL-SCNC: 4.1 MMOL/L — SIGNIFICANT CHANGE UP (ref 3.5–5.3)
POTASSIUM SERPL-SCNC: 4.1 MMOL/L — SIGNIFICANT CHANGE UP (ref 3.5–5.3)
POTASSIUM SERPL-SCNC: 4.4 MMOL/L
PROT SERPL-MCNC: 7 G/DL — SIGNIFICANT CHANGE UP (ref 6–8.3)
PROT SERPL-MCNC: 7.4 G/DL
PROT UR-MCNC: NEGATIVE MG/DL — SIGNIFICANT CHANGE UP
RBC # BLD: 4.16 M/UL — SIGNIFICANT CHANGE UP (ref 3.8–5.2)
RBC # BLD: 4.18 M/UL
RBC # FLD: 16.4 % — HIGH (ref 10.3–14.5)
RBC # FLD: 16.8 %
SODIUM SERPL-SCNC: 133 MMOL/L — LOW (ref 135–145)
SODIUM SERPL-SCNC: 137 MMOL/L — SIGNIFICANT CHANGE UP (ref 135–145)
SODIUM SERPL-SCNC: 141 MMOL/L
SP GR SPEC: 1.01 — SIGNIFICANT CHANGE UP (ref 1–1.03)
TROPONIN T, HIGH SENSITIVITY RESULT: 16 NG/L — HIGH
TROPONIN T, HIGH SENSITIVITY RESULT: 19 NG/L — HIGH
UROBILINOGEN FLD QL: 0.2 MG/DL — SIGNIFICANT CHANGE UP (ref 0.2–1)
WBC # BLD: 6.88 K/UL — SIGNIFICANT CHANGE UP (ref 3.8–10.5)
WBC # FLD AUTO: 6.45 K/UL
WBC # FLD AUTO: 6.88 K/UL — SIGNIFICANT CHANGE UP (ref 3.8–10.5)

## 2025-08-27 PROCEDURE — 84484 ASSAY OF TROPONIN QUANT: CPT

## 2025-08-27 PROCEDURE — 71275 CT ANGIOGRAPHY CHEST: CPT | Mod: 26

## 2025-08-27 PROCEDURE — 71275 CT ANGIOGRAPHY CHEST: CPT

## 2025-08-27 PROCEDURE — 71045 X-RAY EXAM CHEST 1 VIEW: CPT

## 2025-08-27 PROCEDURE — 36415 COLL VENOUS BLD VENIPUNCTURE: CPT

## 2025-08-27 PROCEDURE — 83735 ASSAY OF MAGNESIUM: CPT

## 2025-08-27 PROCEDURE — 80048 BASIC METABOLIC PNL TOTAL CA: CPT

## 2025-08-27 PROCEDURE — 99285 EMERGENCY DEPT VISIT HI MDM: CPT | Mod: FS

## 2025-08-27 PROCEDURE — 71045 X-RAY EXAM CHEST 1 VIEW: CPT | Mod: 26

## 2025-08-27 PROCEDURE — 85025 COMPLETE CBC W/AUTO DIFF WBC: CPT

## 2025-08-27 PROCEDURE — 83880 ASSAY OF NATRIURETIC PEPTIDE: CPT

## 2025-08-27 PROCEDURE — 84100 ASSAY OF PHOSPHORUS: CPT

## 2025-08-27 PROCEDURE — 93010 ELECTROCARDIOGRAM REPORT: CPT

## 2025-08-27 PROCEDURE — 93000 ELECTROCARDIOGRAM COMPLETE: CPT

## 2025-08-27 PROCEDURE — 99215 OFFICE O/P EST HI 40 MIN: CPT

## 2025-08-27 PROCEDURE — 85379 FIBRIN DEGRADATION QUANT: CPT

## 2025-08-27 PROCEDURE — 81003 URINALYSIS AUTO W/O SCOPE: CPT

## 2025-08-27 PROCEDURE — 80053 COMPREHEN METABOLIC PANEL: CPT

## 2025-08-27 RX ADMIN — Medication 500 MILLILITER(S): at 21:06

## 2025-08-28 ENCOUNTER — RESULT REVIEW (OUTPATIENT)
Age: 85
End: 2025-08-28

## 2025-08-28 VITALS
DIASTOLIC BLOOD PRESSURE: 79 MMHG | HEART RATE: 62 BPM | SYSTOLIC BLOOD PRESSURE: 166 MMHG | TEMPERATURE: 98 F | RESPIRATION RATE: 20 BRPM | OXYGEN SATURATION: 98 %

## 2025-08-28 LAB
ANION GAP SERPL CALC-SCNC: 16 MMOL/L — SIGNIFICANT CHANGE UP (ref 5–17)
BUN SERPL-MCNC: 32 MG/DL — HIGH (ref 7–23)
CALCIUM SERPL-MCNC: 10 MG/DL — SIGNIFICANT CHANGE UP (ref 8.4–10.5)
CHLORIDE SERPL-SCNC: 101 MMOL/L — SIGNIFICANT CHANGE UP (ref 96–108)
CO2 SERPL-SCNC: 21 MMOL/L — LOW (ref 22–31)
CREAT SERPL-MCNC: 1.49 MG/DL — HIGH (ref 0.5–1.3)
EGFR: 35 ML/MIN/1.73M2 — LOW
EGFR: 35 ML/MIN/1.73M2 — LOW
GLUCOSE BLDC GLUCOMTR-MCNC: 107 MG/DL — HIGH (ref 70–99)
GLUCOSE BLDC GLUCOMTR-MCNC: 211 MG/DL — HIGH (ref 70–99)
GLUCOSE BLDC GLUCOMTR-MCNC: 248 MG/DL — HIGH (ref 70–99)
GLUCOSE SERPL-MCNC: 124 MG/DL — HIGH (ref 70–99)
POTASSIUM SERPL-MCNC: 3.4 MMOL/L — LOW (ref 3.5–5.3)
POTASSIUM SERPL-SCNC: 3.4 MMOL/L — LOW (ref 3.5–5.3)
SODIUM SERPL-SCNC: 138 MMOL/L — SIGNIFICANT CHANGE UP (ref 135–145)

## 2025-08-28 PROCEDURE — 93356 MYOCRD STRAIN IMG SPCKL TRCK: CPT

## 2025-08-28 PROCEDURE — 83880 ASSAY OF NATRIURETIC PEPTIDE: CPT

## 2025-08-28 PROCEDURE — 78452 HT MUSCLE IMAGE SPECT MULT: CPT

## 2025-08-28 PROCEDURE — 93016 CV STRESS TEST SUPVJ ONLY: CPT

## 2025-08-28 PROCEDURE — 96372 THER/PROPH/DIAG INJ SC/IM: CPT

## 2025-08-28 PROCEDURE — 80048 BASIC METABOLIC PNL TOTAL CA: CPT

## 2025-08-28 PROCEDURE — 93017 CV STRESS TEST TRACING ONLY: CPT

## 2025-08-28 PROCEDURE — 93306 TTE W/DOPPLER COMPLETE: CPT | Mod: 26

## 2025-08-28 PROCEDURE — A9500: CPT

## 2025-08-28 PROCEDURE — 93018 CV STRESS TEST I&R ONLY: CPT

## 2025-08-28 PROCEDURE — 71045 X-RAY EXAM CHEST 1 VIEW: CPT

## 2025-08-28 PROCEDURE — 93306 TTE W/DOPPLER COMPLETE: CPT

## 2025-08-28 PROCEDURE — 78452 HT MUSCLE IMAGE SPECT MULT: CPT | Mod: 26

## 2025-08-28 PROCEDURE — 81003 URINALYSIS AUTO W/O SCOPE: CPT

## 2025-08-28 PROCEDURE — 82962 GLUCOSE BLOOD TEST: CPT

## 2025-08-28 PROCEDURE — 84484 ASSAY OF TROPONIN QUANT: CPT

## 2025-08-28 PROCEDURE — 36415 COLL VENOUS BLD VENIPUNCTURE: CPT

## 2025-08-28 PROCEDURE — 85025 COMPLETE CBC W/AUTO DIFF WBC: CPT

## 2025-08-28 PROCEDURE — 80053 COMPREHEN METABOLIC PANEL: CPT

## 2025-08-28 PROCEDURE — 71275 CT ANGIOGRAPHY CHEST: CPT

## 2025-08-28 PROCEDURE — 83735 ASSAY OF MAGNESIUM: CPT

## 2025-08-28 PROCEDURE — 93005 ELECTROCARDIOGRAM TRACING: CPT | Mod: XU

## 2025-08-28 PROCEDURE — 99236 HOSP IP/OBS SAME DATE HI 85: CPT | Mod: FS

## 2025-08-28 PROCEDURE — 85379 FIBRIN DEGRADATION QUANT: CPT

## 2025-08-28 PROCEDURE — G0378: CPT

## 2025-08-28 PROCEDURE — 99285 EMERGENCY DEPT VISIT HI MDM: CPT | Mod: 25

## 2025-08-28 PROCEDURE — 84100 ASSAY OF PHOSPHORUS: CPT

## 2025-08-28 RX ORDER — DEXTROSE 50 % IN WATER 50 %
15 SYRINGE (ML) INTRAVENOUS ONCE
Refills: 0 | Status: DISCONTINUED | OUTPATIENT
Start: 2025-08-28 | End: 2025-08-31

## 2025-08-28 RX ORDER — INSULIN LISPRO 100 U/ML
INJECTION, SOLUTION INTRAVENOUS; SUBCUTANEOUS
Refills: 0 | Status: DISCONTINUED | OUTPATIENT
Start: 2025-08-28 | End: 2025-08-31

## 2025-08-28 RX ORDER — FUROSEMIDE 10 MG/ML
1 INJECTION INTRAMUSCULAR; INTRAVENOUS
Refills: 0 | DISCHARGE

## 2025-08-28 RX ORDER — SODIUM CHLORIDE 9 G/1000ML
1000 INJECTION, SOLUTION INTRAVENOUS
Refills: 0 | Status: DISCONTINUED | OUTPATIENT
Start: 2025-08-28 | End: 2025-08-31

## 2025-08-28 RX ORDER — INSULIN LISPRO 100 U/ML
INJECTION, SOLUTION INTRAVENOUS; SUBCUTANEOUS AT BEDTIME
Refills: 0 | Status: DISCONTINUED | OUTPATIENT
Start: 2025-08-28 | End: 2025-08-31

## 2025-08-28 RX ORDER — GLUCAGON 3 MG/1
1 POWDER NASAL ONCE
Refills: 0 | Status: DISCONTINUED | OUTPATIENT
Start: 2025-08-28 | End: 2025-08-31

## 2025-08-28 RX ORDER — DEXTROSE 50 % IN WATER 50 %
25 SYRINGE (ML) INTRAVENOUS ONCE
Refills: 0 | Status: DISCONTINUED | OUTPATIENT
Start: 2025-08-28 | End: 2025-08-31

## 2025-08-28 RX ORDER — APIXABAN 2.5 MG/1
5 TABLET, FILM COATED ORAL EVERY 12 HOURS
Refills: 0 | Status: DISCONTINUED | OUTPATIENT
Start: 2025-08-28 | End: 2025-08-31

## 2025-08-28 RX ORDER — CLOPIDOGREL BISULFATE 75 MG/1
75 TABLET, FILM COATED ORAL DAILY
Refills: 0 | Status: DISCONTINUED | OUTPATIENT
Start: 2025-08-28 | End: 2025-08-31

## 2025-08-28 RX ORDER — FUROSEMIDE 10 MG/ML
20 INJECTION INTRAMUSCULAR; INTRAVENOUS DAILY
Refills: 0 | Status: DISCONTINUED | OUTPATIENT
Start: 2025-08-28 | End: 2025-08-28

## 2025-08-28 RX ORDER — AMLODIPINE BESYLATE 10 MG/1
5 TABLET ORAL DAILY
Refills: 0 | Status: DISCONTINUED | OUTPATIENT
Start: 2025-08-28 | End: 2025-08-31

## 2025-08-28 RX ORDER — INSULIN GLARGINE-YFGN 100 [IU]/ML
12 INJECTION, SOLUTION SUBCUTANEOUS EVERY MORNING
Refills: 0 | Status: DISCONTINUED | OUTPATIENT
Start: 2025-08-28 | End: 2025-08-31

## 2025-08-28 RX ORDER — DEXTROSE 50 % IN WATER 50 %
12.5 SYRINGE (ML) INTRAVENOUS ONCE
Refills: 0 | Status: DISCONTINUED | OUTPATIENT
Start: 2025-08-28 | End: 2025-08-31

## 2025-08-28 RX ORDER — AMIODARONE HYDROCHLORIDE 50 MG/ML
100 INJECTION, SOLUTION INTRAVENOUS DAILY
Refills: 0 | Status: DISCONTINUED | OUTPATIENT
Start: 2025-08-28 | End: 2025-08-31

## 2025-08-28 RX ORDER — ATORVASTATIN CALCIUM 80 MG/1
40 TABLET, FILM COATED ORAL AT BEDTIME
Refills: 0 | Status: DISCONTINUED | OUTPATIENT
Start: 2025-08-28 | End: 2025-08-31

## 2025-08-28 RX ADMIN — Medication 320 MILLIGRAM(S): at 04:45

## 2025-08-28 RX ADMIN — AMIODARONE HYDROCHLORIDE 100 MILLIGRAM(S): 50 INJECTION, SOLUTION INTRAVENOUS at 04:44

## 2025-08-28 RX ADMIN — APIXABAN 5 MILLIGRAM(S): 2.5 TABLET, FILM COATED ORAL at 08:19

## 2025-08-28 RX ADMIN — INSULIN LISPRO 2: 100 INJECTION, SOLUTION INTRAVENOUS; SUBCUTANEOUS at 16:04

## 2025-08-28 RX ADMIN — INSULIN GLARGINE-YFGN 12 UNIT(S): 100 INJECTION, SOLUTION SUBCUTANEOUS at 08:19

## 2025-08-28 RX ADMIN — APIXABAN 5 MILLIGRAM(S): 2.5 TABLET, FILM COATED ORAL at 20:34

## 2025-08-28 RX ADMIN — INSULIN LISPRO 2: 100 INJECTION, SOLUTION INTRAVENOUS; SUBCUTANEOUS at 11:24

## 2025-08-28 RX ADMIN — FUROSEMIDE 20 MILLIGRAM(S): 10 INJECTION INTRAMUSCULAR; INTRAVENOUS at 04:44

## 2025-08-28 RX ADMIN — CLOPIDOGREL BISULFATE 75 MILLIGRAM(S): 75 TABLET, FILM COATED ORAL at 16:04

## 2025-08-28 RX ADMIN — AMLODIPINE BESYLATE 5 MILLIGRAM(S): 10 TABLET ORAL at 04:44

## 2025-08-28 RX ADMIN — Medication 40 MILLIEQUIVALENT(S): at 08:18

## 2025-09-03 ENCOUNTER — NON-APPOINTMENT (OUTPATIENT)
Age: 85
End: 2025-09-03

## 2025-09-05 ENCOUNTER — APPOINTMENT (OUTPATIENT)
Dept: UROLOGY | Facility: CLINIC | Age: 85
End: 2025-09-05

## 2025-09-08 ENCOUNTER — APPOINTMENT (OUTPATIENT)
Dept: INTERNAL MEDICINE | Facility: CLINIC | Age: 85
End: 2025-09-08
Payer: MEDICARE

## 2025-09-08 VITALS
DIASTOLIC BLOOD PRESSURE: 70 MMHG | OXYGEN SATURATION: 99 % | HEART RATE: 60 BPM | HEIGHT: 66 IN | WEIGHT: 155 LBS | TEMPERATURE: 97.7 F | BODY MASS INDEX: 24.91 KG/M2 | SYSTOLIC BLOOD PRESSURE: 118 MMHG

## 2025-09-08 DIAGNOSIS — R93.89 ABNORMAL FINDINGS ON DIAGNOSTIC IMAGING OF OTHER SPECIFIED BODY STRUCTURES: ICD-10-CM

## 2025-09-08 DIAGNOSIS — I48.91 UNSPECIFIED ATRIAL FIBRILLATION: ICD-10-CM

## 2025-09-08 DIAGNOSIS — E78.5 HYPERLIPIDEMIA, UNSPECIFIED: ICD-10-CM

## 2025-09-08 DIAGNOSIS — E11.9 TYPE 2 DIABETES MELLITUS W/OUT COMPLICATIONS: ICD-10-CM

## 2025-09-08 PROCEDURE — 99214 OFFICE O/P EST MOD 30 MIN: CPT

## 2025-09-08 PROCEDURE — G2211 COMPLEX E/M VISIT ADD ON: CPT

## 2025-09-09 ENCOUNTER — APPOINTMENT (OUTPATIENT)
Dept: PULMONOLOGY | Facility: CLINIC | Age: 85
End: 2025-09-09
Payer: MEDICARE

## 2025-09-09 VITALS — DIASTOLIC BLOOD PRESSURE: 69 MMHG | HEART RATE: 59 BPM | OXYGEN SATURATION: 99 % | SYSTOLIC BLOOD PRESSURE: 135 MMHG

## 2025-09-09 PROCEDURE — ZZZZZ: CPT

## 2025-09-09 PROCEDURE — 94727 GAS DIL/WSHOT DETER LNG VOL: CPT

## 2025-09-09 PROCEDURE — 99213 OFFICE O/P EST LOW 20 MIN: CPT | Mod: 25

## 2025-09-09 PROCEDURE — 94729 DIFFUSING CAPACITY: CPT

## 2025-09-09 PROCEDURE — 94618 PULMONARY STRESS TESTING: CPT

## 2025-09-09 PROCEDURE — 94010 BREATHING CAPACITY TEST: CPT

## 2025-09-10 LAB
ALBUMIN SERPL ELPH-MCNC: 4.7 G/DL
ALP BLD-CCNC: 79 U/L
ALT SERPL-CCNC: 23 U/L
ANION GAP SERPL CALC-SCNC: 17 MMOL/L
AST SERPL-CCNC: 26 U/L
BASOPHILS # BLD AUTO: 0.06 K/UL
BASOPHILS NFR BLD AUTO: 1 %
BILIRUB DIRECT SERPL-MCNC: 0.19 MG/DL
BILIRUB INDIRECT SERPL-MCNC: 0.3 MG/DL
BILIRUB SERPL-MCNC: 0.5 MG/DL
BUN SERPL-MCNC: 23 MG/DL
CALCIUM SERPL-MCNC: 10.2 MG/DL
CHLORIDE SERPL-SCNC: 98 MMOL/L
CHOLEST SERPL-MCNC: 173 MG/DL
CO2 SERPL-SCNC: 22 MMOL/L
CREAT SERPL-MCNC: 1.42 MG/DL
EGFRCR SERPLBLD CKD-EPI 2021: 37 ML/MIN/1.73M2
EOSINOPHIL # BLD AUTO: 0.1 K/UL
EOSINOPHIL NFR BLD AUTO: 1.6 %
ESTIMATED AVERAGE GLUCOSE: 194 MG/DL
GLUCOSE SERPL-MCNC: 173 MG/DL
HBA1C MFR BLD HPLC: 8.4 %
HCT VFR BLD CALC: 34.2 %
HDLC SERPL-MCNC: 84 MG/DL
HGB BLD-MCNC: 10.7 G/DL
IMM GRANULOCYTES NFR BLD AUTO: 0.3 %
LDLC SERPL-MCNC: 71 MG/DL
LYMPHOCYTES # BLD AUTO: 1.79 K/UL
LYMPHOCYTES NFR BLD AUTO: 28.6 %
MAGNESIUM SERPL-MCNC: 1.9 MG/DL
MAN DIFF?: NORMAL
MCHC RBC-ENTMCNC: 25.2 PG
MCHC RBC-ENTMCNC: 31.3 G/DL
MCV RBC AUTO: 80.7 FL
MONOCYTES # BLD AUTO: 0.78 K/UL
MONOCYTES NFR BLD AUTO: 12.5 %
NEUTROPHILS # BLD AUTO: 3.5 K/UL
NEUTROPHILS NFR BLD AUTO: 56 %
NONHDLC SERPL-MCNC: 89 MG/DL
NT-PROBNP SERPL-MCNC: 905 PG/ML
PLATELET # BLD AUTO: 240 K/UL
POTASSIUM SERPL-SCNC: 4.4 MMOL/L
PROT SERPL-MCNC: 7.4 G/DL
RBC # BLD: 4.24 M/UL
RBC # FLD: 16.8 %
SODIUM SERPL-SCNC: 136 MMOL/L
T4 FREE SERPL-MCNC: 1.8 NG/DL
TRIGL SERPL-MCNC: 102 MG/DL
TSH SERPL-ACNC: 1.62 UIU/ML
VIT B12 SERPL-MCNC: 1208 PG/ML
WBC # FLD AUTO: 6.25 K/UL

## 2025-09-18 ENCOUNTER — APPOINTMENT (OUTPATIENT)
Dept: INTERNAL MEDICINE | Facility: CLINIC | Age: 85
End: 2025-09-18

## 2025-09-18 VITALS
HEART RATE: 60 BPM | BODY MASS INDEX: 25.39 KG/M2 | HEIGHT: 66 IN | DIASTOLIC BLOOD PRESSURE: 70 MMHG | WEIGHT: 158 LBS | OXYGEN SATURATION: 96 % | TEMPERATURE: 97.5 F | SYSTOLIC BLOOD PRESSURE: 120 MMHG

## 2025-09-18 VITALS — OXYGEN SATURATION: 99 %

## 2025-09-18 DIAGNOSIS — J43.9 EMPHYSEMA, UNSPECIFIED: ICD-10-CM

## 2025-09-18 DIAGNOSIS — R06.00 DYSPNEA, UNSPECIFIED: ICD-10-CM

## 2025-09-18 LAB
ANION GAP SERPL CALC-SCNC: 16 MMOL/L
BUN SERPL-MCNC: 26 MG/DL
CALCIUM SERPL-MCNC: 10 MG/DL
CHLORIDE SERPL-SCNC: 98 MMOL/L
CO2 SERPL-SCNC: 22 MMOL/L
CREAT SERPL-MCNC: 1.45 MG/DL
EGFRCR SERPLBLD CKD-EPI 2021: 36 ML/MIN/1.73M2
GLUCOSE SERPL-MCNC: 132 MG/DL
NT-PROBNP SERPL-MCNC: 907 PG/ML
POTASSIUM SERPL-SCNC: 4.8 MMOL/L
SODIUM SERPL-SCNC: 136 MMOL/L

## 2025-09-20 ENCOUNTER — RX RENEWAL (OUTPATIENT)
Age: 85
End: 2025-09-20